# Patient Record
Sex: FEMALE | Race: WHITE | Employment: FULL TIME | URBAN - METROPOLITAN AREA
[De-identification: names, ages, dates, MRNs, and addresses within clinical notes are randomized per-mention and may not be internally consistent; named-entity substitution may affect disease eponyms.]

---

## 2019-06-13 ENCOUNTER — APPOINTMENT (OUTPATIENT)
Dept: GENERAL RADIOLOGY | Age: 49
DRG: 174 | End: 2019-06-13
Payer: MEDICARE

## 2019-06-13 ENCOUNTER — HOSPITAL ENCOUNTER (INPATIENT)
Age: 49
LOS: 2 days | Discharge: HOME | DRG: 174 | End: 2019-06-15
Attending: EMERGENCY MEDICINE | Admitting: INTERNAL MEDICINE
Payer: MEDICARE

## 2019-06-13 DIAGNOSIS — I21.3 ST ELEVATION MYOCARDIAL INFARCTION (STEMI), UNSPECIFIED ARTERY (HCC): Primary | ICD-10-CM

## 2019-06-13 LAB
ACTIVATED CLOTTING TIME, LOW RANGE: >400 SEC
ALBUMIN SERPL-MCNC: 4.1 GM/DL (ref 3.4–5)
ALP BLD-CCNC: 58 IU/L (ref 40–128)
ALT SERPL-CCNC: 13 U/L (ref 10–40)
ANION GAP SERPL CALCULATED.3IONS-SCNC: 13 MMOL/L (ref 4–16)
AST SERPL-CCNC: 19 IU/L (ref 15–37)
BASOPHILS ABSOLUTE: 0.1 K/CU MM
BASOPHILS RELATIVE PERCENT: 1.1 % (ref 0–1)
BILIRUB SERPL-MCNC: 0.2 MG/DL (ref 0–1)
BUN BLDV-MCNC: 18 MG/DL (ref 6–23)
CALCIUM SERPL-MCNC: 9.4 MG/DL (ref 8.3–10.6)
CHLORIDE BLD-SCNC: 101 MMOL/L (ref 99–110)
CO2: 23 MMOL/L (ref 21–32)
CREAT SERPL-MCNC: 0.9 MG/DL (ref 0.6–1.1)
DIFFERENTIAL TYPE: ABNORMAL
EOSINOPHILS ABSOLUTE: 0.2 K/CU MM
EOSINOPHILS RELATIVE PERCENT: 1.7 % (ref 0–3)
GFR AFRICAN AMERICAN: >60 ML/MIN/1.73M2
GFR NON-AFRICAN AMERICAN: >60 ML/MIN/1.73M2
GLUCOSE BLD-MCNC: 111 MG/DL (ref 70–99)
HCT VFR BLD CALC: 40.2 % (ref 37–47)
HEMOGLOBIN: 13 GM/DL (ref 12.5–16)
IMMATURE NEUTROPHIL %: 0.4 % (ref 0–0.43)
LIPASE: 30 IU/L (ref 13–60)
LV EF: 43 %
LVEF MODALITY: NORMAL
LYMPHOCYTES ABSOLUTE: 3.8 K/CU MM
LYMPHOCYTES RELATIVE PERCENT: 34.3 % (ref 24–44)
MCH RBC QN AUTO: 30.1 PG (ref 27–31)
MCHC RBC AUTO-ENTMCNC: 32.3 % (ref 32–36)
MCV RBC AUTO: 93.1 FL (ref 78–100)
MONOCYTES ABSOLUTE: 1 K/CU MM
MONOCYTES RELATIVE PERCENT: 9.2 % (ref 0–4)
NUCLEATED RBC %: 0 %
PDW BLD-RTO: 13.3 % (ref 11.7–14.9)
PLATELET # BLD: 421 K/CU MM (ref 140–440)
PMV BLD AUTO: 8.7 FL (ref 7.5–11.1)
POTASSIUM SERPL-SCNC: 3.7 MMOL/L (ref 3.5–5.1)
RBC # BLD: 4.32 M/CU MM (ref 4.2–5.4)
SEGMENTED NEUTROPHILS ABSOLUTE COUNT: 6 K/CU MM
SEGMENTED NEUTROPHILS RELATIVE PERCENT: 53.3 % (ref 36–66)
SODIUM BLD-SCNC: 137 MMOL/L (ref 135–145)
TOTAL IMMATURE NEUTOROPHIL: 0.04 K/CU MM
TOTAL NUCLEATED RBC: 0 K/CU MM
TOTAL PROTEIN: 6.9 GM/DL (ref 6.4–8.2)
WBC # BLD: 11.2 K/CU MM (ref 4–10.5)

## 2019-06-13 PROCEDURE — 2500000003 HC RX 250 WO HCPCS

## 2019-06-13 PROCEDURE — 71045 X-RAY EXAM CHEST 1 VIEW: CPT

## 2019-06-13 PROCEDURE — 6360000002 HC RX W HCPCS

## 2019-06-13 PROCEDURE — 93005 ELECTROCARDIOGRAM TRACING: CPT | Performed by: EMERGENCY MEDICINE

## 2019-06-13 PROCEDURE — 83690 ASSAY OF LIPASE: CPT

## 2019-06-13 PROCEDURE — 85347 COAGULATION TIME ACTIVATED: CPT

## 2019-06-13 PROCEDURE — 93458 L HRT ARTERY/VENTRICLE ANGIO: CPT | Performed by: INTERNAL MEDICINE

## 2019-06-13 PROCEDURE — 6360000002 HC RX W HCPCS: Performed by: INTERNAL MEDICINE

## 2019-06-13 PROCEDURE — 6370000000 HC RX 637 (ALT 250 FOR IP)

## 2019-06-13 PROCEDURE — 96372 THER/PROPH/DIAG INJ SC/IM: CPT

## 2019-06-13 PROCEDURE — 2100000000 HC CCU R&B

## 2019-06-13 PROCEDURE — 6360000004 HC RX CONTRAST MEDICATION

## 2019-06-13 PROCEDURE — 02703ZZ DILATION OF CORONARY ARTERY, ONE ARTERY, PERCUTANEOUS APPROACH: ICD-10-PCS | Performed by: INTERNAL MEDICINE

## 2019-06-13 PROCEDURE — 93306 TTE W/DOPPLER COMPLETE: CPT

## 2019-06-13 PROCEDURE — 92941 PRQ TRLML REVSC TOT OCCL AMI: CPT

## 2019-06-13 PROCEDURE — 92941 PRQ TRLML REVSC TOT OCCL AMI: CPT | Performed by: INTERNAL MEDICINE

## 2019-06-13 PROCEDURE — C1894 INTRO/SHEATH, NON-LASER: HCPCS

## 2019-06-13 PROCEDURE — 6370000000 HC RX 637 (ALT 250 FOR IP): Performed by: INTERNAL MEDICINE

## 2019-06-13 PROCEDURE — C1874 STENT, COATED/COV W/DEL SYS: HCPCS

## 2019-06-13 PROCEDURE — C1887 CATHETER, GUIDING: HCPCS

## 2019-06-13 PROCEDURE — C1725 CATH, TRANSLUMIN NON-LASER: HCPCS

## 2019-06-13 PROCEDURE — 2580000003 HC RX 258: Performed by: INTERNAL MEDICINE

## 2019-06-13 PROCEDURE — 99255 IP/OBS CONSLTJ NEW/EST HI 80: CPT | Performed by: INTERNAL MEDICINE

## 2019-06-13 PROCEDURE — 99285 EMERGENCY DEPT VISIT HI MDM: CPT

## 2019-06-13 PROCEDURE — 80053 COMPREHEN METABOLIC PANEL: CPT

## 2019-06-13 PROCEDURE — 4A023N7 MEASUREMENT OF CARDIAC SAMPLING AND PRESSURE, LEFT HEART, PERCUTANEOUS APPROACH: ICD-10-PCS | Performed by: INTERNAL MEDICINE

## 2019-06-13 PROCEDURE — 2580000003 HC RX 258

## 2019-06-13 PROCEDURE — 93458 L HRT ARTERY/VENTRICLE ANGIO: CPT

## 2019-06-13 PROCEDURE — 2709999900 HC NON-CHARGEABLE SUPPLY

## 2019-06-13 PROCEDURE — 85025 COMPLETE CBC W/AUTO DIFF WBC: CPT

## 2019-06-13 PROCEDURE — 2720000010 HC SURG SUPPLY STERILE

## 2019-06-13 PROCEDURE — 93010 ELECTROCARDIOGRAM REPORT: CPT | Performed by: INTERNAL MEDICINE

## 2019-06-13 PROCEDURE — C1769 GUIDE WIRE: HCPCS

## 2019-06-13 PROCEDURE — 87081 CULTURE SCREEN ONLY: CPT

## 2019-06-13 PROCEDURE — 6360000002 HC RX W HCPCS: Performed by: EMERGENCY MEDICINE

## 2019-06-13 RX ORDER — HEPARIN SODIUM 5000 [USP'U]/ML
5000 INJECTION, SOLUTION INTRAVENOUS; SUBCUTANEOUS ONCE
Status: DISCONTINUED | OUTPATIENT
Start: 2019-06-13 | End: 2019-06-13

## 2019-06-13 RX ORDER — PRASUGREL 10 MG/1
10 TABLET, FILM COATED ORAL DAILY
Status: DISCONTINUED | OUTPATIENT
Start: 2019-06-14 | End: 2019-06-13

## 2019-06-13 RX ORDER — SODIUM CHLORIDE 0.9 % (FLUSH) 0.9 %
10 SYRINGE (ML) INJECTION EVERY 12 HOURS SCHEDULED
Status: DISCONTINUED | OUTPATIENT
Start: 2019-06-13 | End: 2019-06-15 | Stop reason: HOSPADM

## 2019-06-13 RX ORDER — IBUPROFEN 200 MG
200 TABLET ORAL EVERY 6 HOURS PRN
COMMUNITY
End: 2020-06-28

## 2019-06-13 RX ORDER — HEPARIN SODIUM 5000 [USP'U]/ML
5000 INJECTION, SOLUTION INTRAVENOUS; SUBCUTANEOUS ONCE
Status: COMPLETED | OUTPATIENT
Start: 2019-06-13 | End: 2019-06-13

## 2019-06-13 RX ORDER — ASPIRIN 81 MG/1
81 TABLET, CHEWABLE ORAL DAILY
Status: DISCONTINUED | OUTPATIENT
Start: 2019-06-14 | End: 2019-06-15 | Stop reason: HOSPADM

## 2019-06-13 RX ORDER — NITROGLYCERIN 0.4 MG/1
TABLET SUBLINGUAL
Status: COMPLETED
Start: 2019-06-13 | End: 2019-06-13

## 2019-06-13 RX ORDER — FENTANYL CITRATE 50 UG/ML
50 INJECTION, SOLUTION INTRAMUSCULAR; INTRAVENOUS ONCE
Status: DISCONTINUED | OUTPATIENT
Start: 2019-06-13 | End: 2019-06-13

## 2019-06-13 RX ORDER — SODIUM CHLORIDE 9 MG/ML
INJECTION, SOLUTION INTRAVENOUS CONTINUOUS
Status: DISCONTINUED | OUTPATIENT
Start: 2019-06-13 | End: 2019-06-13

## 2019-06-13 RX ORDER — ONDANSETRON 2 MG/ML
4 INJECTION INTRAMUSCULAR; INTRAVENOUS EVERY 6 HOURS PRN
Status: DISCONTINUED | OUTPATIENT
Start: 2019-06-13 | End: 2019-06-15 | Stop reason: HOSPADM

## 2019-06-13 RX ORDER — SODIUM CHLORIDE 0.9 % (FLUSH) 0.9 %
10 SYRINGE (ML) INJECTION PRN
Status: DISCONTINUED | OUTPATIENT
Start: 2019-06-13 | End: 2019-06-15 | Stop reason: HOSPADM

## 2019-06-13 RX ORDER — PRASUGREL 10 MG/1
10 TABLET, FILM COATED ORAL DAILY
Status: DISCONTINUED | OUTPATIENT
Start: 2019-06-14 | End: 2019-06-15 | Stop reason: HOSPADM

## 2019-06-13 RX ORDER — ACETAMINOPHEN 325 MG/1
650 TABLET ORAL EVERY 4 HOURS PRN
Status: DISCONTINUED | OUTPATIENT
Start: 2019-06-13 | End: 2019-06-15 | Stop reason: HOSPADM

## 2019-06-13 RX ORDER — ATORVASTATIN CALCIUM 40 MG/1
80 TABLET, FILM COATED ORAL NIGHTLY
Status: DISCONTINUED | OUTPATIENT
Start: 2019-06-13 | End: 2019-06-15 | Stop reason: HOSPADM

## 2019-06-13 RX ORDER — ASPIRIN 325 MG
500 TABLET ORAL EVERY 4 HOURS PRN
Status: ON HOLD | COMMUNITY
End: 2019-06-14 | Stop reason: HOSPADM

## 2019-06-13 RX ORDER — ATORVASTATIN CALCIUM 40 MG/1
80 TABLET, FILM COATED ORAL NIGHTLY
Status: DISCONTINUED | OUTPATIENT
Start: 2019-06-13 | End: 2019-06-13

## 2019-06-13 RX ADMIN — SODIUM CHLORIDE: 9 INJECTION, SOLUTION INTRAVENOUS at 06:49

## 2019-06-13 RX ADMIN — NITROGLYCERIN 0.4 MG: 0.4 TABLET, ORALLY DISINTEGRATING SUBLINGUAL at 04:54

## 2019-06-13 RX ADMIN — TIROFIBAN 0.15 MCG/KG/MIN: 5 INJECTION, SOLUTION INTRAVENOUS at 09:42

## 2019-06-13 RX ADMIN — TIROFIBAN 0.15 MCG/KG/MIN: 5 INJECTION, SOLUTION INTRAVENOUS at 06:48

## 2019-06-13 RX ADMIN — ACETAMINOPHEN 650 MG: 325 TABLET ORAL at 20:50

## 2019-06-13 RX ADMIN — SODIUM CHLORIDE, PRESERVATIVE FREE 10 ML: 5 INJECTION INTRAVENOUS at 20:51

## 2019-06-13 RX ADMIN — ATORVASTATIN CALCIUM 80 MG: 40 TABLET, FILM COATED ORAL at 20:50

## 2019-06-13 RX ADMIN — TIROFIBAN 0.15 MCG/KG/MIN: 5 INJECTION, SOLUTION INTRAVENOUS at 15:38

## 2019-06-13 RX ADMIN — METOPROLOL TARTRATE 25 MG: 25 TABLET, FILM COATED ORAL at 20:50

## 2019-06-13 RX ADMIN — SODIUM CHLORIDE: 9 INJECTION, SOLUTION INTRAVENOUS at 12:31

## 2019-06-13 RX ADMIN — HEPARIN SODIUM 5000 UNITS: 5000 INJECTION, SOLUTION INTRAVENOUS; SUBCUTANEOUS at 04:39

## 2019-06-13 RX ADMIN — METOPROLOL TARTRATE 25 MG: 25 TABLET, FILM COATED ORAL at 08:19

## 2019-06-13 RX ADMIN — ACETAMINOPHEN 650 MG: 325 TABLET ORAL at 08:56

## 2019-06-13 RX ADMIN — Medication: at 06:49

## 2019-06-13 ASSESSMENT — PAIN DESCRIPTION - PROGRESSION
CLINICAL_PROGRESSION: NOT CHANGED
CLINICAL_PROGRESSION: GRADUALLY WORSENING

## 2019-06-13 ASSESSMENT — PAIN DESCRIPTION - ONSET
ONSET: ON-GOING
ONSET: ON-GOING

## 2019-06-13 ASSESSMENT — PAIN DESCRIPTION - ORIENTATION
ORIENTATION: RIGHT;LEFT
ORIENTATION: RIGHT;LEFT;LOWER

## 2019-06-13 ASSESSMENT — PAIN DESCRIPTION - PAIN TYPE
TYPE: CHRONIC PAIN
TYPE: ACUTE PAIN
TYPE: CHRONIC PAIN
TYPE: CHRONIC PAIN

## 2019-06-13 ASSESSMENT — PAIN SCALES - GENERAL
PAINLEVEL_OUTOF10: 8
PAINLEVEL_OUTOF10: 3
PAINLEVEL_OUTOF10: 10
PAINLEVEL_OUTOF10: 8
PAINLEVEL_OUTOF10: 5
PAINLEVEL_OUTOF10: 0
PAINLEVEL_OUTOF10: 8

## 2019-06-13 ASSESSMENT — PAIN DESCRIPTION - DESCRIPTORS
DESCRIPTORS: ACHING;BURNING
DESCRIPTORS: PRESSURE
DESCRIPTORS: ACHING;BURNING
DESCRIPTORS: ACHING;DISCOMFORT

## 2019-06-13 ASSESSMENT — PAIN DESCRIPTION - LOCATION
LOCATION: CHEST;ARM
LOCATION: HIP;LEG
LOCATION: HIP;LEG
LOCATION: OTHER (COMMENT)

## 2019-06-13 ASSESSMENT — PAIN DESCRIPTION - FREQUENCY
FREQUENCY: CONTINUOUS

## 2019-06-13 ASSESSMENT — PAIN - FUNCTIONAL ASSESSMENT: PAIN_FUNCTIONAL_ASSESSMENT: PREVENTS OR INTERFERES SOME ACTIVE ACTIVITIES AND ADLS

## 2019-06-13 NOTE — CONSULTS
Name:  Feli Evans /Age/Sex: 1970  (51 y.o. female)   MRN & CSN:  [id number] & [id number] Admission Date/Time: 2019  4:11 AM   Location:  ED21/ED-21 PCP: Deric Gorman Day: 1          Referring physician:  No admitting provider for patient encounter. Reason for consultation:  STEMI        Thanks for referral.    Information source: patient    CC;  CP    Thank you for involving me in taking  care of Feli Evans who  is a 51 y. o.year  Old female  Presents with  Severe mid sternal constant CP rad to neck for an hour came to ED has ST elev in inf leads. Getting heparin, ASA to Cath lab. Past medical history:    has a past medical history of MRSA infection. Past surgical history:   has a past surgical history that includes Cholecystectomy. Social History:   reports that she has been smoking cigarettes. She has been smoking about 1.00 pack per day. She has never used smokeless tobacco. She reports that she drinks alcohol. She reports that she does not use drugs. Family history:  family history is not on file. No Known Allergies      nitroGLYCERIN (NITROSTAT) 0.4 MG SL tablet      Current Facility-Administered Medications   Medication Dose Route Frequency Provider Last Rate Last Dose    nitroGLYCERIN (NITROSTAT) 0.4 MG SL tablet              No current outpatient medications on file. Review of Systems:  All 14 systems reviewed, all negative except for  CP    Physical Examination:    BP (!) 120/94   Pulse 82   Temp 98.3 °F (36.8 °C) (Oral)   Resp 12   Ht 5' 9\" (1.753 m)   Wt 240 lb (108.9 kg)   SpO2 98%   BMI 35.44 kg/m²    Wt Readings from Last 3 Encounters:   19 240 lb (108.9 kg)     Body mass index is 35.44 kg/m².       General Appearance:  fair  Head: normocephalic     Eyes: normal, noninjected conjunctiva    ENT: normal mucosa, noninjected throat, normal     NECK: No JVP  No thyromegaly        Cardiovascular: No thrills palpated   Auscultation: Normal S1 and S2,  no murmur   carotid bruit no   Abdominal Aorta no bruit    Respiratory:    Breath sounds Clear = 0    Extremities:  none Edema clubbing ,   no cyanosis    SKIN: Warm and well perfused, no pallor or cyanosis    Vascular exam:  Pedal Pulses: palp  bilaterally        Abdomen:  No masses or tenderness. No organomegaly noted. Neurological:  Oriented to time, place, and person   No focal neurological deficit noted. Psychiatric:normal mood, no anxiety    Lab Review   No results for input(s): WBC, HGB, HCT, PLT in the last 72 hours. No results for input(s): NA, K, CL, CO2, PHOS, BUN, CREATININE in the last 72 hours. Invalid input(s): CA  No results for input(s): AST, ALT, ALB, BILIDIR, BILITOT, ALKPHOS in the last 72 hours. No results for input(s): TROPONINI in the last 72 hours.   No results found for: BNP  No results found for: INR, PROTIME        Assessment/Recommendations:     - STEMI of Inf wall: ASA, statin, heparin to cath lab  - Risk factor modification         Marylou Escalera MD, 6/13/2019 4:31 AM

## 2019-06-13 NOTE — PROGRESS NOTES
Nutrition Assessment (Low Risk)    Type and Reason for Visit: Positive Nutrition Screen, Initial, Patient Education(wt loss)    Nutrition Recommendations:   Continue Cardiac Diet     Nutrition Assessment:   Pt has intentionally lost wt PTA by cutting out soda for past year. Eating well on Cardiac Diet post MI. Provided/reviewed Heart Healthy Eating Nutrition Therapy. Patient assessed for nutritional risk. Deemed to be at low risk at this time. Will continue to monitor for changes in status.     Malnutrition Assessment:  · Malnutrition Status: No malnutrition    Nutrition Risk Level   Risk Level: Low    Nutrition Diagnosis:   · Problem: No nutrition diagnosis at this time    Nutrition Intervention:  Food and/or Delivery: Continue current diet  Nutrition Education/Counseling/Coordination of Care:  Continued Inpatient Monitoring, Education Completed, Coordination of Care      Electronically signed by Garett Conway RD, LD on 6/13/19 at 1:08 PM    Contact Number: [phone]

## 2019-06-13 NOTE — ED PROVIDER NOTES
week: None     Minutes per session: None    Stress: None   Relationships    Social connections:     Talks on phone: None     Gets together: None     Attends Jew service: None     Active member of club or organization: None     Attends meetings of clubs or organizations: None     Relationship status: None    Intimate partner violence:     Fear of current or ex partner: None     Emotionally abused: None     Physically abused: None     Forced sexual activity: None   Other Topics Concern    None   Social History Narrative    None       Medications/Allergies     Previous Medications    No medications on file     No Known Allergies     Physical Exam       ED Triage Vitals [19 0406]   BP Temp Temp Source Pulse Resp SpO2 Height Weight   (!) 120/94 98.3 °F (36.8 °C) Oral 82 12 98 % 5' 9\" (1.753 m) 240 lb (108.9 kg)     GENERAL APPEARANCE: Awake and alert. Cooperative. No acute distress, but is tearful and appears uncomfortable  HEAD: Normocephalic. Atraumatic. EYES: Sclera anicteric. ENT: Tolerates saliva. No trismus. NECK: Supple. Trachea midline. CARDIO: RRR. Radial pulse 2+. LUNGS: Respirations unlabored. CTAB. ABDOMEN: Soft. Non-distended. Non-tender. EXTREMITIES: No acute deformities. No pitting edema, and intact DP pulses bilateral lower extremities  SKIN: Warm and dry. NEUROLOGICAL: No gross facial drooping. Moves all 4 extremities spontaneously. PSYCHIATRIC: Normal mood. Diagnostics   Labs:  Unable to include labs because there is asterixis in the ECG print out, they were reviewed by the physician    Radiographs:  No results found. Procedures/EK lead EKG per my interpretation:  Normal Sinus Rhythm 86  Axis is   Normal  QTc is  normal  There is specific T wave changes appreciated. There is specific ST wave changes appreciated.   There is ST elevation in 2,3 aVF and V4 V5 V6 consistent with a STEMI  Prior EKG to compare with was not available     ED Course and MDM   In brief, Nikko Dawkins is a 51 y.o. female who presented to the emergency department with chest pain and was found to have a STEMI. Patient's physical exam without evidence of congestive heart failure. Dr. Shola Alvarado. was notified and is currently at the bedside planning to take the pt to the cath lab once the team is assembled. The patient is artery received a full dose of aspirin as she was given 2 more aspirin with EMS, was given a 5000 unit bolus of heparin here and taken to the Cath Lab. ED Medication Orders (From admission, onward)    Start Ordered     Status Ordering Provider    06/13/19 0445 06/13/19 0437  heparin (porcine) injection 5,000 Units  ONCE      Last MAR action:  Given - by VARINDER CARLIN on 06/13/19 at [address] [place] [address] PAULY    06/13/19 0429 06/13/19 0429  nitroGLYCERIN (NITROSTAT) 0.4 MG SL tablet     Note to Pharmacy:  Chelsey Chan: cabinet override    Ordered           Final Impression      1.  ST elevation myocardial infarction (STEMI), unspecified artery ([facility] [address]      DISPOSITION Decision To Admit 06/13/2019 04:41:38 AM     (Please note that portions of this note may have been completed with a voice recognition program. Efforts were made to edit the dictations but occasionally words are mis-transcribed.)    MD Brandie Leslie MD  06/13/19 8804

## 2019-06-13 NOTE — PROGRESS NOTES
Rest of the air removed from TR BAND- tegaderm applied- hand washed off. No bldg noted- no hematoma noted.  Pt tolerated well

## 2019-06-13 NOTE — PROGRESS NOTES
Dr. Diego Gómez called- general updates given- also new orders received to place new med orders in and new consult for hospitalist

## 2019-06-13 NOTE — H&P
Nikko Dawkins, 51 y.o., female    Primary care physician:  Jolaine Dancer     Chief Complaint: Chest Pain/ STEMI    History of Present Illness:  She comes in for evaluation after he started having chest pain in the middle of the night around 3 AM.  She says the pain then started radiating to both her arms it was unrelenting 10 out of 10 associated with some shortness of breath. She has long-standing history of bilateral lower extremity pain thought to be sciatica. She says that she is never had symptoms like this before she does have family history of early coronary artery disease. She is not known to have any other medical problems. The pain was persistent ongoing therefore 911 was called in the emergency department she was noted to have ST elevation. Lab was activated    Past medical history:    has a past medical history of MRSA infection. Past surgical history:   has a past surgical history that includes Cholecystectomy (1992) and skin biopsy. Social History:   reports that she has been smoking cigarettes. She has been smoking about 1.00 pack per day. She has never used smokeless tobacco. She reports that she drinks alcohol. She reports that she has current or past drug history. Drug: Marijuana. Frequency: 2.00 times per week. Family history:  family history includes Arthritis in her sister; Asthma in her sister; Coronary Art Dis in her father; Diabetes in her father; Heart Disease in her father; High Blood Pressure in her father; High Cholesterol in her father. Allergies:    No Known Allergies    Home Medications:  Prior to Admission medications    Medication Sig Start Date End Date Taking?  Authorizing Provider   ibuprofen (ADVIL;MOTRIN) 200 MG tablet Take 200 mg by mouth every 6 hours as needed for Pain   Yes Historical Provider, MD   aspirin 325 MG tablet Take 500 mg by mouth every 4 hours as needed for Pain   Yes Historical Provider, MD       Review of systems: Review of Systems: range of motion in all major joints. No tenderness to palpation or major deformities noted. Back- No tenderness. Integument:  Warm, Dry, No erythema, No rash. Skin: no rash, no ulcers  Lymphatic:  No lymphadenopathy noted. Neurologic:  Alert & oriented x 3, Normal motor function, Normal sensory function, No focal deficits noted. Lab Review   Recent Labs     06/13/19 0425   WBC 11.2*   HGB 13.0   HCT 40.2         Recent Labs     06/13/19 0425      K 3.7      CO2 23   BUN 18   CREATININE 0.9     Recent Labs     06/13/19 0425   AST 19   ALT 13   BILITOT 0.2   ALKPHOS 58     No results for input(s): TROPONINI in the last 72 hours. No results found for: INR, PROTIME  No results found for: BNP      Assessment:    STEMI Anterior        ASA : 2 , Mallampati class II  Plan:   1. Chest Pain/ STEMI: Alternate and risks versus benefits were discussed in detail. We will plan cardiac catheterization. We  discussed the risk of but not limited to  potential kidney failure, emergent surgery,blood transfusion  transfusion, infection, potential even death. Patient is in agreement and wants to proceed  2.start statins  3.  Consult hospitalist for co management

## 2019-06-13 NOTE — CONSULTS
Consult Note (Hospitalist, Internal Medicine)  IDENTIFYING INFORMATION   PATIENT:  Peter Knight  MRN:  [id number]  ADMIT DATE: 6/13/2019  TIME OF EVALUATION: 6/13/2019 11:47 AM    REASON FOR CONSULT   Chest pain    HISTORY OF PRESENT ILLNESS   Peter Knight is a 51 y.o. female with a past medical history of 25 pack years smoking, hx of CAD s/p PCI age 47s in father and a strong paternal hx of CV disease who presents with STEMI. She was sitting working on a computer this morning when she noted onset of left sided sternal chest pain, initially sharp but later had sharp characteristic to pain 10/10, radiate to b/l arms with tingling sensation. She took 2 ASA with no improvement. On route, EMS offered some nitro with some improvement. She was dx with STEMI in the ED and went for emergent LHC. PAST MEDICAL, SURGICAL, FAMILY, and SOCIAL HISTORY     Past Medical History:   Diagnosis Date    MRSA infection      Past Surgical History:   Procedure Laterality Date    CHOLECYSTECTOMY  1992    SKIN BIOPSY      17 mrsa spot removals, head shoulder stomach back     Family History   Problem Relation Age of Onset    Diabetes Father     Coronary Art Dis Father     Heart Disease Father     High Cholesterol Father     High Blood Pressure Father     Arthritis Sister     Asthma Sister      Social History     Socioeconomic History    Marital status: Legally      Spouse name: Yohan Mcneill    Number of children: 1    Years of education: 15    Highest education level: None   Occupational History    None   Social Needs    Financial resource strain: None    Food insecurity:     Worry: None     Inability: None    Transportation needs:     Medical: None     Non-medical: None   Tobacco Use    Smoking status: Current Every Day Smoker     Packs/day: 1.00     Types: Cigarettes    Smokeless tobacco: Never Used   Substance and Sexual Activity    Alcohol use: Yes     Comment: occasional    Drug use:  Yes Frequency: 2.0 times per week     Types: Marijuana    Sexual activity: Not Currently   Lifestyle    Physical activity:     Days per week: None     Minutes per session: None    Stress: None   Relationships    Social connections:     Talks on phone: None     Gets together: None     Attends Yarsanism service: None     Active member of club or organization: None     Attends meetings of clubs or organizations: None     Relationship status: None    Intimate partner violence:     Fear of current or ex partner: None     Emotionally abused: None     Physically abused: None     Forced sexual activity: None   Other Topics Concern    None   Social History Narrative    None       MEDICATIONS   Medications Prior to Admission  Medications Prior to Admission: ibuprofen (ADVIL;MOTRIN) 200 MG tablet, Take 200 mg by mouth every 6 hours as needed for Pain  aspirin 325 MG tablet, Take 500 mg by mouth every 4 hours as needed for Pain    Current Medications  Current Facility-Administered Medications   Medication Dose Route Frequency Provider Last Rate Last Dose    0.9 % sodium chloride infusion   Intravenous Continuous Katharine Robb MD 75 mL/hr at 06/13/19 0649      sodium chloride flush 0.9 % injection 10 mL  10 mL Intravenous 2 times per day Katharine Robb MD        sodium chloride flush 0.9 % injection 10 mL  10 mL Intravenous PRN Katharine Robb MD        acetaminophen (TYLENOL) tablet 650 mg  650 mg Oral Q4H PRN Katharine Robb MD   650 mg at 06/13/19 0856    magnesium hydroxide (MILK OF MAGNESIA) 400 MG/5ML suspension 30 mL  30 mL Oral Daily PRN Katharine Robb MD        ondansetron [facility]) injection 4 mg  4 mg Intravenous Q6H PRN Katharine Robb MD        [START ON 6/14/2019] aspirin chewable tablet 81 mg  81 mg Oral Daily Katharine Robb MD        [START ON 6/14/2019] prasugrel (EFFIENT) tablet 10 mg  10 mg Oral Daily Katharine Robb MD        tirofiban (AGGRASTAT) IV bolus 2,722.5 mcg 25 mcg/kg Intravenous Once Fadi Lang MD        tirofiban (AGGRASTAT) 50 mcg/mL infusion  0.15 mcg/kg/min Intravenous Continuous Fadi Lang MD 19.6 mL/hr at 06/13/19 0942 0.15 mcg/kg/min at 06/13/19 0942    metoprolol tartrate (LOPRESSOR) tablet 25 mg  25 mg Oral BID Fadi Lang MD   25 mg at 06/13/19 0819    atorvastatin (LIPITOR) tablet 80 mg  80 mg Oral Nightly Fadi Lang MD             Allergies  No Known Allergies    REVIEW OF SYSTEMS     ROS  14 point review of systems reviewed. Pertinent positive or negative as per HPI or otherwise reviewed as negative    PHYSICAL EXAM     Wt Readings from Last 3 Encounters:   06/13/19 (S) 261 lb 3.9 oz (118.5 kg)       Blood pressure 98/71, pulse 77, temperature 98 °F (36.7 °C), temperature source Oral, resp. rate 20, height 5' 9\" (1.753 m), weight (S) 261 lb 3.9 oz (118.5 kg), last menstrual period 06/13/2018, SpO2 99 %. General - AAO x 3  Psych - Appropriate affect/speech. No agitation  Eyes - Eye lids intact. No scleral icterus  ENT - Lips wnl. External ear clear/dry/intact. No thyromegaly on inspection  Neuro - No gross peripheral or central neuro deficits on inspection  Heart - Sinus. RRR. S1 and S2 present. No elevated JVD appreciated  Lung - Adequate air entry b/l, No crackes/wheezes appreciated  GI - Soft. No guarding/rigidity. BS+   - No CVA/suprapubic tenderness or palpable bladder distension  Skin - Intact. No rash/petechiae/ecchymosis. Warm extremities  MSK - Joints with normal ROM.  No joint swellings          LABS AND IMAGING   CBC  [unfilled]    Last 3 Hemoglobin  Lab Results   Component Value Date    HGB 13.0 06/13/2019     Last 3 WBC/ANC  Lab Results   Component Value Date    WBC 11.2 06/13/2019     No components found for: GRNLOCTYABS  Last 3 Platelets  No results found for: PLATELET  Chemistry  [unfilled]  [unfilled]  No results found for: LDH  Coagulation Studies  No results found for: PTT, INR  Liver Function Studies  Lab Results   Component Value Date    ALT 13 06/13/2019    AST 19 06/13/2019    ALKPHOS 58 06/13/2019       Recent Imaging     XR CHEST PORTABLE [[id number]] Collected: 06/13/19 0504     Order Status: Completed Updated: 06/13/19 0508     Narrative:       EXAMINATION:  ONE XRAY VIEW OF THE CHEST    6/13/2019 4:48 am    COMPARISON:  10/20/2008    HISTORY:  ORDERING SYSTEM PROVIDED HISTORY: cp  TECHNOLOGIST PROVIDED HISTORY:  Reason for exam:->cp  Ordering Physician Provided Reason for Exam: cp  Acuity: Acute  Type of Exam: Initial  Mechanism of Injury: cp  Relevant Medical/Surgical History: cp    FINDINGS:  The lungs are adequately inflated.  There is no focal consolidation, pleural  effusion or pneumothorax.  The cardiac silhouette is mildly enlarged, but  stable.  No acute bony abnormality.     Impression:       No acute process.          EKG personally reviewed with rate 86, ST elevation anterolateral leads      Relevant labs and imaging reviewed    ASSESSMENT AND PLAN     NSTEMI  - s/p emergent PCI 6/13  - DAPT, statin, BB  - IV aggrastat for 12 hours  - IVF  - TTE with LVEF 40-45%, apical dyskinesis   - tele, pulse ox monitoring of early post-stemi complications  - check lipid panel, may benefit statin    Tobacco abuse  - discussed chantix outpatient which she agrees to try    Lovenox ppx    [address] [facility], Internal Medicine  6/13/2019 at 11:47 AM

## 2019-06-13 NOTE — ED NOTES
Bed: ED-21  Expected date:   Expected time:   Means of arrival:   Comments:  EMS 41 F chest pain     Esther Tidwell  06/13/19 [id number]

## 2019-06-14 LAB
CHOLESTEROL: 160 MG/DL
CULTURE: NORMAL
HCT VFR BLD CALC: 36.6 % (ref 37–47)
HDLC SERPL-MCNC: 48 MG/DL
HEMOGLOBIN: 11.7 GM/DL (ref 12.5–16)
LDL CHOLESTEROL DIRECT: 114 MG/DL
Lab: NORMAL
MCH RBC QN AUTO: 30.2 PG (ref 27–31)
MCHC RBC AUTO-ENTMCNC: 32 % (ref 32–36)
MCV RBC AUTO: 94.6 FL (ref 78–100)
PDW BLD-RTO: 13.5 % (ref 11.7–14.9)
PLATELET # BLD: 342 K/CU MM (ref 140–440)
PMV BLD AUTO: 8.7 FL (ref 7.5–11.1)
RBC # BLD: 3.87 M/CU MM (ref 4.2–5.4)
SPECIMEN: NORMAL
TRIGL SERPL-MCNC: 115 MG/DL
WBC # BLD: 8 K/CU MM (ref 4–10.5)

## 2019-06-14 PROCEDURE — 83721 ASSAY OF BLOOD LIPOPROTEIN: CPT

## 2019-06-14 PROCEDURE — 6360000002 HC RX W HCPCS: Performed by: INTERNAL MEDICINE

## 2019-06-14 PROCEDURE — 6370000000 HC RX 637 (ALT 250 FOR IP): Performed by: INTERNAL MEDICINE

## 2019-06-14 PROCEDURE — 2140000000 HC CCU INTERMEDIATE R&B

## 2019-06-14 PROCEDURE — 80061 LIPID PANEL: CPT

## 2019-06-14 PROCEDURE — 85027 COMPLETE CBC AUTOMATED: CPT

## 2019-06-14 PROCEDURE — 2580000003 HC RX 258: Performed by: INTERNAL MEDICINE

## 2019-06-14 PROCEDURE — 99232 SBSQ HOSP IP/OBS MODERATE 35: CPT | Performed by: INTERNAL MEDICINE

## 2019-06-14 PROCEDURE — 94761 N-INVAS EAR/PLS OXIMETRY MLT: CPT

## 2019-06-14 RX ORDER — PRASUGREL 10 MG/1
10 TABLET, FILM COATED ORAL DAILY
Qty: 90 TABLET | Refills: 0 | Status: SHIPPED | OUTPATIENT
Start: 2019-06-15 | End: 2020-02-13 | Stop reason: SDUPTHER

## 2019-06-14 RX ORDER — VARENICLINE TARTRATE 0.5 MG/1
.5-1 TABLET, FILM COATED ORAL SEE ADMIN INSTRUCTIONS
Qty: 57 TABLET | Refills: 0 | Status: SHIPPED | OUTPATIENT
Start: 2019-06-14 | End: 2020-02-13

## 2019-06-14 RX ORDER — POTASSIUM CHLORIDE 750 MG/1
40 TABLET, FILM COATED, EXTENDED RELEASE ORAL ONCE
Status: COMPLETED | OUTPATIENT
Start: 2019-06-14 | End: 2019-06-14

## 2019-06-14 RX ORDER — ATORVASTATIN CALCIUM 80 MG/1
80 TABLET, FILM COATED ORAL NIGHTLY
Qty: 90 TABLET | Refills: 0 | Status: SHIPPED | OUTPATIENT
Start: 2019-06-14 | End: 2020-02-13 | Stop reason: SDUPTHER

## 2019-06-14 RX ORDER — ASPIRIN 81 MG/1
81 TABLET ORAL DAILY
Qty: 90 TABLET | Refills: 0 | Status: ON HOLD | OUTPATIENT
Start: 2019-06-14 | End: 2022-10-21 | Stop reason: HOSPADM

## 2019-06-14 RX ADMIN — ENOXAPARIN SODIUM 40 MG: 40 INJECTION SUBCUTANEOUS at 22:42

## 2019-06-14 RX ADMIN — METOPROLOL TARTRATE 25 MG: 25 TABLET, FILM COATED ORAL at 22:42

## 2019-06-14 RX ADMIN — ASPIRIN 81 MG 81 MG: 81 TABLET ORAL at 08:36

## 2019-06-14 RX ADMIN — METOPROLOL TARTRATE 25 MG: 25 TABLET, FILM COATED ORAL at 08:37

## 2019-06-14 RX ADMIN — ATORVASTATIN CALCIUM 80 MG: 40 TABLET, FILM COATED ORAL at 22:41

## 2019-06-14 RX ADMIN — SODIUM CHLORIDE, PRESERVATIVE FREE 10 ML: 5 INJECTION INTRAVENOUS at 22:43

## 2019-06-14 RX ADMIN — POTASSIUM CHLORIDE 40 MEQ: 750 TABLET, FILM COATED, EXTENDED RELEASE ORAL at 10:52

## 2019-06-14 RX ADMIN — PRASUGREL 10 MG: 10 TABLET, FILM COATED ORAL at 08:36

## 2019-06-14 RX ADMIN — ACETAMINOPHEN 650 MG: 325 TABLET ORAL at 08:36

## 2019-06-14 ASSESSMENT — PAIN DESCRIPTION - DESCRIPTORS
DESCRIPTORS: ACHING;DISCOMFORT
DESCRIPTORS: ACHING;BURNING

## 2019-06-14 ASSESSMENT — PAIN DESCRIPTION - PAIN TYPE
TYPE: CHRONIC PAIN
TYPE: CHRONIC PAIN

## 2019-06-14 ASSESSMENT — PAIN SCALES - GENERAL
PAINLEVEL_OUTOF10: 10
PAINLEVEL_OUTOF10: 8

## 2019-06-14 ASSESSMENT — PAIN DESCRIPTION - FREQUENCY
FREQUENCY: CONTINUOUS
FREQUENCY: CONTINUOUS

## 2019-06-14 ASSESSMENT — PAIN DESCRIPTION - PROGRESSION: CLINICAL_PROGRESSION: NOT CHANGED

## 2019-06-14 ASSESSMENT — PAIN DESCRIPTION - ORIENTATION
ORIENTATION: RIGHT;LEFT;LOWER;MID
ORIENTATION: RIGHT;LEFT

## 2019-06-14 ASSESSMENT — PAIN DESCRIPTION - LOCATION
LOCATION: HIP;LEG
LOCATION: BACK;LEG

## 2019-06-14 ASSESSMENT — PAIN DESCRIPTION - ONSET: ONSET: ON-GOING

## 2019-06-14 NOTE — FLOWSHEET NOTE
AM labs collected sent to lab @ this time as ordered    RN offered to assist patient with bath this morning, she replies, not right now please. I am still very tired. \" Patient has been awake going to bathroom every 1 hour to 2 hours.      Esequiel Mckeon RN 6:38 AM

## 2019-06-14 NOTE — PROGRESS NOTES
Progress Note (Hospitalist, Internal Medicine)  IDENTIFYING INFORMATION   PATIENT:  Asuncion Ontiveros  MRN:  [id number]  ADMIT DATE: 6/13/2019  TIME OF EVALUATION: 6/14/2019 11:40 AM      HISTORY OF PRESENT ILLNESS   Asuncion Ontiveros is a 51 y.o. female with a past medical history of 25 pack years smoking, hx of CAD s/p PCI age 47s in father and a strong paternal hx of CV disease who presents with STEMI. She was sitting working on a computer this morning when she noted onset of left sided sternal chest pain, initially sharp but later had sharp characteristic to pain 10/10, radiate to b/l arms with tingling sensation. She took 2 ASA with no improvement. On route, EMS offered some nitro with some improvement. She was dx with STEMI in the ED and went for emergent LHC. SUBJECTIVE     Doing fair.  Discussed attempts to increase activity    MEDICATIONS   Medications Prior to Admission  Medications Prior to Admission: ibuprofen (ADVIL;MOTRIN) 200 MG tablet, Take 200 mg by mouth every 6 hours as needed for Pain  [DISCONTINUED] aspirin 325 MG tablet, Take 500 mg by mouth every 4 hours as needed for Pain    Current Medications  Current Facility-Administered Medications   Medication Dose Route Frequency Provider Last Rate Last Dose    sodium chloride flush 0.9 % injection 10 mL  10 mL Intravenous 2 times per day Pedro Payton MD   10 mL at 06/13/19 2051    sodium chloride flush 0.9 % injection 10 mL  10 mL Intravenous PRN Pedro Payton MD        acetaminophen (TYLENOL) tablet 650 mg  650 mg Oral Q4H PRN Pedro Payton MD   650 mg at 06/14/19 0836    magnesium hydroxide (MILK OF MAGNESIA) 400 MG/5ML suspension 30 mL  30 mL Oral Daily PRN Pedro Payton MD        ondansetron [facility]) injection 4 mg  4 mg Intravenous Q6H PRN Pedro Payton MD        aspirin chewable tablet 81 mg  81 mg Oral Daily Pedro Payton MD   81 mg at 06/14/19 0836    prasugrel (EFFIENT) tablet 10 mg  10 mg Oral Daily Maria De Jesus Somers Jl Goldman MD   10 mg at 06/14/19 0836    tirofiban (AGGRASTAT) IV bolus 2,722.5 mcg  25 mcg/kg Intravenous Once Fabricio Olmstead MD        metoprolol tartrate (LOPRESSOR) tablet 25 mg  25 mg Oral BID Fabricio Olmstead MD   25 mg at 06/14/19 0837    atorvastatin (LIPITOR) tablet 80 mg  80 mg Oral Nightly Fabricio Olmstead MD   80 mg at 06/13/19 2050    enoxaparin (LOVENOX) injection 40 mg  40 mg Subcutaneous Daily Jarret Evans MD             Allergies  No Known Allergies    REVIEW OF SYSTEMS     Within above limitations. 14 point review of systems reviewed. Pertinent positive or negative as per HPI or otherwise negative per 14 point systems review. Reviewed 6/14/2019 at 11:40 AM    PHYSICAL EXAM       Blood pressure 100/60, pulse 87, temperature 98 °F (36.7 °C), temperature source Oral, resp. rate 19, height 5' 9\" (1.753 m), weight 251 lb 12.3 oz (114.2 kg), last menstrual period 06/13/2018, SpO2 95 %. General - AAO x 3  Psych - Appropriate affect/speech. No agitation  Eyes - Eye lids intact. No scleral icterus  ENT - Lips wnl. External ear clear/dry/intact. No thyromegaly on inspection  Neuro - No gross peripheral or central neuro deficits on inspection  Heart - Sinus. RRR. S1 and S2 present. No elevated JVD appreciated  Lung - Adequate air entry b/l, No crackes/wheezes appreciated  GI - Soft. No guarding/rigidity. BS+   - No CVA/suprapubic tenderness or palpable bladder distension  Skin - Intact. No rash/petechiae/ecchymosis. Warm extremities  MSK - Joints with normal ROM.  No joint swellings        LABS AND IMAGING   CBC  [unfilled]    Last 3 Hemoglobin  Lab Results   Component Value Date    HGB 11.7 06/14/2019    HGB 13.0 06/13/2019     Last 3 WBC/ANC  Lab Results   Component Value Date    WBC 8.0 06/14/2019    WBC 11.2 06/13/2019     No components found for: GRNLOCTYABS  Last 3 Platelets  No results found for: PLATELET  Chemistry  [unfilled]  [unfilled]  No results found for: LDH  Coagulation Studies  No results found for: PTT, INR  Liver Function Studies  Lab Results   Component Value Date    ALT 13 06/13/2019    AST 19 06/13/2019    ALKPHOS 58 06/13/2019       Recent Imaging    XR CHEST PORTABLE [[id number]] Collected: 06/13/19 0504      Order Status: Completed Updated: 06/13/19 0508     Narrative:       EXAMINATION:  ONE XRAY VIEW OF THE CHEST    6/13/2019 4:48 am    COMPARISON:  10/20/2008    HISTORY:  ORDERING SYSTEM PROVIDED HISTORY: cp  TECHNOLOGIST PROVIDED HISTORY:  Reason for exam:->cp  Ordering Physician Provided Reason for Exam: cp  Acuity: Acute  Type of Exam: Initial  Mechanism of Injury: cp  Relevant Medical/Surgical History: cp    FINDINGS:  The lungs are adequately inflated.  There is no focal consolidation, pleural  effusion or pneumothorax.  The cardiac silhouette is mildly enlarged, but  stable.  No acute bony abnormality.     Impression:       No acute process.        EKG personally reviewed with rate 86, ST elevation anterolateral leads        Relevant labs and imaging reviewed    ASSESSMENT AND PLAN       NSTEMI  - s/p emergent PCI 6/13  - DAPT, statin, BB  - add statin  - completed IV aggrastat  - IVF  - TTE with LVEF 40-45%, apical dyskinesis   - tele, pulse ox monitoring of early post-stemi complications  - OOB, increase activity, possible home tomorrow if all is well    Tobacco abuse  - discussed chantix outpatient which she agrees to try     Lovenox ppx        [address] [facility] Internal Medicine  6/14/2019 at 11:40 AM

## 2019-06-14 NOTE — FLOWSHEET NOTE
Dr. Cristóbal osorio. Updated patient not having any chest pain or distress. Verbal orders received patient may transfer to 3N cardiac stepdown on dayshift. [address] Danie Stephens called and notified of pending transfer to 3N.     Latasha Hernandez RN 5:21 AM

## 2019-06-14 NOTE — PROGRESS NOTES
Seen by Cath Lab nurse. Patient is alert and oriented and conversational. Discussed STEMI; involving LAD artery and how this has affected the heart muscle. We discussed how to watch for signs and symptoms of Heart Failure and Pneumonia along with education on how to use the Heart Failure and Pneumonia stoplight. Reinforced the importance of early symptoms recognition and calling their doctor when in the yellow and red zone. Educational material left with patient in reference to discussed material. Pt and family voiced understood. Questions asked and answered.

## 2019-06-14 NOTE — PROGRESS NOTES
edema  Pulses; palpable  Neuro: grossly normal    Medications:    potassium chloride  40 mEq Oral Once    sodium chloride flush  10 mL Intravenous 2 times per day    aspirin  81 mg Oral Daily    prasugrel  10 mg Oral Daily    tirofiban  25 mcg/kg Intravenous Once    metoprolol tartrate  25 mg Oral BID    atorvastatin  80 mg Oral Nightly    enoxaparin  40 mg Subcutaneous Daily       sodium chloride flush, acetaminophen, magnesium hydroxide, ondansetron    Lab Data:  CBC:   Recent Labs     06/13/19 0425 06/14/19  0630   WBC 11.2* 8.0   HGB 13.0 11.7*   HCT 40.2 36.6*   MCV 93.1 94.6    342     BMP:   Recent Labs     06/13/19 0425      K 3.7      CO2 23   BUN 18   CREATININE 0.9     LIVER PROFILE:   Recent Labs     06/13/19 0425   AST 19   ALT 13   LIPASE 30   BILITOT 0.2   ALKPHOS 58     PT/INR: No results for input(s): PROTIME, INR in the last 72 hours. APTT: No results for input(s): APTT in the last 72 hours. BNP:  No results for input(s): BNP in the last 72 hours.   TROPONIN: @TROPONINI:3@  Labs, consult, tests reviewed    Assessment/ PLAN:    As above                  Kasandra Valera MD 6/14/2019 9:10 AM

## 2019-06-15 VITALS
BODY MASS INDEX: 37.44 KG/M2 | HEIGHT: 69 IN | WEIGHT: 252.8 LBS | OXYGEN SATURATION: 99 % | SYSTOLIC BLOOD PRESSURE: 94 MMHG | HEART RATE: 83 BPM | TEMPERATURE: 97.7 F | DIASTOLIC BLOOD PRESSURE: 54 MMHG | RESPIRATION RATE: 13 BRPM

## 2019-06-15 LAB
ANION GAP SERPL CALCULATED.3IONS-SCNC: 10 MMOL/L (ref 4–16)
BASOPHILS ABSOLUTE: 0.1 K/CU MM
BASOPHILS RELATIVE PERCENT: 1 % (ref 0–1)
BUN BLDV-MCNC: 15 MG/DL (ref 6–23)
CALCIUM SERPL-MCNC: 9.3 MG/DL (ref 8.3–10.6)
CHLORIDE BLD-SCNC: 106 MMOL/L (ref 99–110)
CO2: 20 MMOL/L (ref 21–32)
CREAT SERPL-MCNC: 0.8 MG/DL (ref 0.6–1.1)
DIFFERENTIAL TYPE: ABNORMAL
EOSINOPHILS ABSOLUTE: 0.3 K/CU MM
EOSINOPHILS RELATIVE PERCENT: 2.9 % (ref 0–3)
GFR AFRICAN AMERICAN: >60 ML/MIN/1.73M2
GFR NON-AFRICAN AMERICAN: >60 ML/MIN/1.73M2
GLUCOSE BLD-MCNC: 98 MG/DL (ref 70–99)
HCT VFR BLD CALC: 38.7 % (ref 37–47)
HEMOGLOBIN: 12 GM/DL (ref 12.5–16)
IMMATURE NEUTROPHIL %: 0.2 % (ref 0–0.43)
LYMPHOCYTES ABSOLUTE: 3.3 K/CU MM
LYMPHOCYTES RELATIVE PERCENT: 36.4 % (ref 24–44)
MAGNESIUM: 2 MG/DL (ref 1.8–2.4)
MCH RBC QN AUTO: 29.8 PG (ref 27–31)
MCHC RBC AUTO-ENTMCNC: 31 % (ref 32–36)
MCV RBC AUTO: 96 FL (ref 78–100)
MONOCYTES ABSOLUTE: 0.9 K/CU MM
MONOCYTES RELATIVE PERCENT: 9.8 % (ref 0–4)
NUCLEATED RBC %: 0 %
PDW BLD-RTO: 13.4 % (ref 11.7–14.9)
PLATELET # BLD: 371 K/CU MM (ref 140–440)
PMV BLD AUTO: 8.7 FL (ref 7.5–11.1)
POTASSIUM SERPL-SCNC: 4.2 MMOL/L (ref 3.5–5.1)
RBC # BLD: 4.03 M/CU MM (ref 4.2–5.4)
SEGMENTED NEUTROPHILS ABSOLUTE COUNT: 4.6 K/CU MM
SEGMENTED NEUTROPHILS RELATIVE PERCENT: 49.7 % (ref 36–66)
SODIUM BLD-SCNC: 136 MMOL/L (ref 135–145)
TOTAL IMMATURE NEUTOROPHIL: 0.02 K/CU MM
TOTAL NUCLEATED RBC: 0 K/CU MM
WBC # BLD: 9.2 K/CU MM (ref 4–10.5)

## 2019-06-15 PROCEDURE — 99232 SBSQ HOSP IP/OBS MODERATE 35: CPT | Performed by: INTERNAL MEDICINE

## 2019-06-15 PROCEDURE — 83735 ASSAY OF MAGNESIUM: CPT

## 2019-06-15 PROCEDURE — 85025 COMPLETE CBC W/AUTO DIFF WBC: CPT

## 2019-06-15 PROCEDURE — 80048 BASIC METABOLIC PNL TOTAL CA: CPT

## 2019-06-15 PROCEDURE — 6370000000 HC RX 637 (ALT 250 FOR IP): Performed by: INTERNAL MEDICINE

## 2019-06-15 PROCEDURE — 36415 COLL VENOUS BLD VENIPUNCTURE: CPT

## 2019-06-15 RX ADMIN — ASPIRIN 81 MG 81 MG: 81 TABLET ORAL at 10:53

## 2019-06-15 RX ADMIN — METOPROLOL TARTRATE 25 MG: 25 TABLET, FILM COATED ORAL at 10:53

## 2019-06-15 RX ADMIN — PRASUGREL 10 MG: 10 TABLET, FILM COATED ORAL at 10:53

## 2019-06-15 ASSESSMENT — PAIN SCALES - GENERAL: PAINLEVEL_OUTOF10: 0

## 2019-06-15 NOTE — PROGRESS NOTES
Cardiology Progress Note     Admit Date:  6/13/2019    Consult reason/ Seen today for :   POST STEMI    Subjective and  Overnight Events :  Echo shows EF of 40 % range      Chief complain on admission : 51 y. o.year old who is admitted for  Chief Complaint   Patient presents with    Chest Pain      Assessment / Plan:  ASCVD: Continue aspirin and effeint for atleast one yr, continue statins, ACEinhibitors, betablockers  cardiomyopathy , add lisinopriol  Cam be dose as outpatient once bp allows   HTN: stable, continue To titrate up medication as needed  DVT Prophylaxis if no contraindication  Follow up in office     Past medical history:    has a past medical history of MRSA infection. Past surgical history:   has a past surgical history that includes Cholecystectomy (1992) and skin biopsy. Social History:   reports that she has been smoking cigarettes. She has been smoking about 1.00 pack per day. She has never used smokeless tobacco. She reports that she drinks alcohol. She reports that she has current or past drug history. Drug: Marijuana. Frequency: 2.00 times per week. Family history:  family history includes Arthritis in her sister; Asthma in her sister; Coronary Art Dis in her father; Diabetes in her father; Heart Disease in her father; High Blood Pressure in her father; High Cholesterol in her father.     No Known Allergies    Review of Systems:    All 14 systems were reviewed and are negative  Except for the positive findings  which as documented     BP (!) 94/54   Pulse 83   Temp 97.7 °F (36.5 °C) (Axillary)   Resp 13   Ht 5' 9\" (1.753 m)   Wt 252 lb 12.8 oz (114.7 kg)   LMP 06/13/2018   SpO2 99%   BMI 37.33 kg/m²       Intake/Output Summary (Last 24 hours) at 6/15/2019 1508  Last data filed at 6/15/2019 0401  Gross per 24 hour   Intake --   Output 625 ml   Net -625 ml     Physical Exam:  Constitutional:  Well hours.     ECHO :   echocardiogram    Assessment:  51 y. o.year old who is admitted for  Chief Complaint   Patient presents with    Chest Pain    , active issues as noted below:  Impression:  Active Problems:    STEMI (ST elevation myocardial infarction) ([facility] [address]  Resolved Problems:    * No resolved hospital problems. *            All labs, medications and tests reviewed by myself , continue all other medications of all above medical condition listed as is except for changes mentioned above. Thank you very much for consult , please call with questions.     Iftikhar Arnold MD 6/15/2019 3:08 PM

## 2019-06-15 NOTE — DISCHARGE SUMMARY
[facility] 1970 [id number]  PCP:  Fabrice Running date: 6/13/2019  Admitting Physician: Andre Moe MD    Discharge date: 6/15/2019 Discharge Physician: Aurelia Borrego MD      Reason for admission:   Chief Complaint   Patient presents with    Chest Pain     Present on Admission:   STEMI (ST elevation myocardial infarction) [facility])       Discharge Diagnoses & Hospital Course[de-identified]          NSTEMI  - s/p emergent PCI 6/13  - DAPT, statin, BB, add statin  - completed IV aggrastat  - TTE with LVEF 40-45%, apical dyskinesis   - tele, pulse ox monitoring doing fair  - outpatient f/u with cardiology in 1 week    Tobacco abuse  - discussed chantix outpatient which she agrees to try             Exam:   Wt Readings from Last 3 Encounters:   06/15/19 252 lb 12.8 oz (114.7 kg)       Blood pressure 118/77, pulse 79, temperature 98.1 °F (36.7 °C), temperature source Oral, resp. rate 21, height 5' 9\" (1.753 m), weight 252 lb 12.8 oz (114.7 kg), last menstrual period 06/13/2018, SpO2 96 %. General - AAO x 3  Psych - Appropriate affect/speech. No agitation  Eyes - Eye lids intact. No scleral icterus  ENT - Lips wnl. External ear clear/dry/intact. No thyromegaly on inspection  Neuro - No gross peripheral or central neuro deficits on inspection  Heart - Sinus. RRR. S1 and S2 present. No elevated JVD appreciated  Lung - Adequate air entry b/l, No crackles/wheezes appreciated  GI -  Soft. No guarding/rigidity. No hepatosplenomegaly/ascites.  BS+        Significant Diagnostic Studies:   CBC:   Recent Labs     06/13/19  0425 06/14/19  0630 06/15/19  0454   WBC 11.2* 8.0 9.2   HGB 13.0 11.7* 12.0*    342 371     WBC   Date/Time Value Ref Range Status   06/15/2019 04:54 AM 9.2 4.0 - 10.5 K/CU MM Final   06/14/2019 06:30 AM 8.0 4.0 - 10.5 K/CU MM Final   06/13/2019 04:25 AM 11.2 (H) 4.0 - 10.5 K/CU MM Final     Hemoglobin   Date/Time Value Ref Range Status   06/15/2019 04:54 AM 12.0 (L) 12.5 - 16.0 GM/DL Final   06/14/2019 06:30 AM 11.7 (L) 12.5 - 16.0 GM/DL Final   06/13/2019 04:25 AM 13.0 12.5 - 16.0 GM/DL Final     Platelets   Date/Time Value Ref Range Status   06/15/2019 04:54  140 - 440 K/CU MM Final   06/14/2019 06:30  140 - 440 K/CU MM Final   06/13/2019 04:25  140 - 440 K/CU MM Final    CMP:  Recent Labs     06/13/19  0425 06/15/19  0454    136   K 3.7 4.2    106   CO2 23 20*   BUN 18 15   CREATININE 0.9 0.8   CALCIUM 9.4 9.3   PROT 6.9  --    LABALBU 4.1  --    BILITOT 0.2  --    ALKPHOS 58  --    AST 19  --    ALT 13  --      Troponin: No results for input(s): TROPONINI in the last 72 hours. BNP: No results for input(s): BNP in the last 72 hours. Lipids:   Recent Labs     06/14/19  0630   CHOL 160   HDL 48     ABGs:No results for input(s): PH, HVT8IJU, PO2ART, BE, ECQ4AMT, CO2CT, O2SAT, LABCARB in the last 72 hours. Invalid input(s): METHGBART    Glucose: No results for input(s): POCGLU in the last 72 hours. Magnesium:   Recent Labs     06/15/19  0454   MG 2.0     Phosphorus:No results for input(s): PHOS in the last 72 hours. INR: No results for input(s): INR in the last 72 hours.       Patient Instructions:   [facility] Medication Instructions STEVE:[id number]    Printed on:06/15/19 1016   Medication Information                      aspirin 81 MG EC tablet  Take 1 tablet by mouth daily for 90 doses             atorvastatin (LIPITOR) 80 MG tablet  Take 1 tablet by mouth nightly             ibuprofen (ADVIL;MOTRIN) 200 MG tablet  Take 200 mg by mouth every 6 hours as needed for Pain             metoprolol tartrate (LOPRESSOR) 25 MG tablet  Take 1 tablet by mouth 2 times daily             prasugrel (EFFIENT) 10 MG TABS  Take 1 tablet by mouth daily             varenicline (CHANTIX) 0.5 MG tablet  Take 1-2 tablets by mouth See Admin Instructions 0.5mg DAILY for 3 days followed by 0.5mg TWICE DAILY for 4 days followed by 1mg TWICE DAILY Code Status: Full Code     Consults:   IP CONSULT TO CARDIAC REHAB  IP CONSULT TO HOSPITALIST    Diet: cardiac diet    Activity: activity as tolerated   Work:    Discharged Condition: fair    Prognosis: Fair    Disposition: home      Follow-up with     Follow-up With  Details  [address] [place] [address]   Lissette Lopez MD  Schedule an appointment as soon as possible for a visit in 1 week  call to follow up with cardiologist in 1 week for post hospitalization care  [address] [facility] [address]  [phone]            Discharge Physician Signed:   Carli Nixon  Hospitalist, Internal medicine  6/15/2019 at 10:16 AM    The patient was seen and examined on day of discharge and this discharge summary is in conjunction with any daily progress note from day of discharge.   Time spent on discharge in the examination, evaluation, counseling and review of medications and discharge plan: <30 minutes    Please forward this discharge summary to patient's PCP

## 2019-06-18 ENCOUNTER — TELEPHONE (OUTPATIENT)
Dept: CARDIOLOGY CLINIC | Age: 49
End: 2019-06-18

## 2019-06-18 LAB
EKG ATRIAL RATE: 86 BPM
EKG DIAGNOSIS: NORMAL
EKG P AXIS: -3 DEGREES
EKG P-R INTERVAL: 140 MS
EKG Q-T INTERVAL: 406 MS
EKG QRS DURATION: 116 MS
EKG QTC CALCULATION (BAZETT): 485 MS
EKG R AXIS: 99 DEGREES
EKG T AXIS: 48 DEGREES
EKG VENTRICULAR RATE: 86 BPM

## 2019-06-18 NOTE — TELEPHONE ENCOUNTER
Pt has to wait until her follow up with Dr. Nicolette Fajardo on 06/26/2019 there is nothing in her chart from Dr. Nicolette Fajardo or any other doctor in our practice about returning to work

## 2019-06-26 ENCOUNTER — OFFICE VISIT (OUTPATIENT)
Dept: CARDIOLOGY CLINIC | Age: 49
End: 2019-06-26
Payer: MEDICARE

## 2019-06-26 VITALS
BODY MASS INDEX: 38.92 KG/M2 | WEIGHT: 256.8 LBS | HEIGHT: 68 IN | SYSTOLIC BLOOD PRESSURE: 116 MMHG | HEART RATE: 72 BPM | DIASTOLIC BLOOD PRESSURE: 78 MMHG

## 2019-06-26 DIAGNOSIS — Z98.61 POST PTCA: Primary | ICD-10-CM

## 2019-06-26 PROCEDURE — 1111F DSCHRG MED/CURRENT MED MERGE: CPT | Performed by: INTERNAL MEDICINE

## 2019-06-26 PROCEDURE — 99213 OFFICE O/P EST LOW 20 MIN: CPT | Performed by: INTERNAL MEDICINE

## 2019-06-26 PROCEDURE — 93000 ELECTROCARDIOGRAM COMPLETE: CPT | Performed by: INTERNAL MEDICINE

## 2019-06-26 PROCEDURE — G8427 DOCREV CUR MEDS BY ELIG CLIN: HCPCS | Performed by: INTERNAL MEDICINE

## 2019-06-26 PROCEDURE — G8419 CALC BMI OUT NRM PARAM NOF/U: HCPCS | Performed by: INTERNAL MEDICINE

## 2019-06-26 PROCEDURE — 4004F PT TOBACCO SCREEN RCVD TLK: CPT | Performed by: INTERNAL MEDICINE

## 2019-06-26 PROCEDURE — G8598 ASA/ANTIPLAT THER USED: HCPCS | Performed by: INTERNAL MEDICINE

## 2019-06-26 NOTE — PROGRESS NOTES
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 Patient is  compliant with prescribed medicines. - Hyperlipidimea, lipids are in acceptable range. Pt  is  compliant with medicines                  The patient does not have cardiac complaints    Nikko Dawkins has the following history recorded in care path:  Patient Active Problem List    Diagnosis Date Noted    STEMI (ST elevation myocardial infarction) ([facility] [address] 06/13/2019    STEMI involving right coronary artery [facility])      Current Outpatient Medications   Medication Sig Dispense Refill    atorvastatin (LIPITOR) 80 MG tablet Take 1 tablet by mouth nightly 90 tablet 0    metoprolol tartrate (LOPRESSOR) 25 MG tablet Take 1 tablet by mouth 2 times daily 180 tablet 0    prasugrel (EFFIENT) 10 MG TABS Take 1 tablet by mouth daily 90 tablet 0    aspirin 81 MG EC tablet Take 1 tablet by mouth daily for 90 doses 90 tablet 0    varenicline (CHANTIX) 0.5 MG tablet Take 1-2 tablets by mouth See Admin Instructions 0.5mg DAILY for 3 days followed by 0.5mg TWICE DAILY for 4 days followed by 1mg TWICE DAILY 57 tablet 0    ibuprofen (ADVIL;MOTRIN) 200 MG tablet Take 200 mg by mouth every 6 hours as needed for Pain       No current facility-administered medications for this visit. Allergies: Patient has no known allergies.   Past Medical History:   Diagnosis Date    MRSA infection      Past Surgical History:   Procedure Laterality Date    CHOLECYSTECTOMY  1992    SKIN BIOPSY      16 mrsa spot removals, head shoulder stomach back      As reviewed   Family History   Problem Relation Age of Onset    Diabetes Father     Coronary Art Dis Father     Heart Disease Father  High Cholesterol Father     High Blood Pressure Father     Arthritis Sister     Asthma Sister      Social History     Tobacco Use    Smoking status: Current Every Day Smoker     Packs/day: 0.25     Types: Cigarettes    Smokeless tobacco: Never Used    Tobacco comment: 3 cigarettes a day    Substance Use Topics    Alcohol use: Yes     Comment: occasional      Review of Systems:    Constitutional: Negative for diaphoresis and fatigue  Psychological:Negative for anxiety or depression  HENT: Negative for headaches, nasal congestion, sinus pain or vertigo  Eyes: Negative for visual disturbance. Endocrine: Negative for polydipsia/polyuria  Respiratory: Negative for shortness of breath  Cardiovascular: Negative for chest pain, dyspnea on exertion, claudication, edema, irregular heartbeat, murmur, palpitations or shortness of breath  Gastrointestinal: Negative for abdominal pain or heartburn  Genito-Urinary: Negative for urinary frequency/urgency  Musculoskeletal: Negative for muscle pain, muscular weakness, negative for pain in arm and leg or swelling in foot and leg  Neurological: Negative for dizziness, headaches, memory loss, numbness/tingling, visual changes, syncope  Dermatological: Negative for rash    Objective:  /78   Pulse 72   Ht 5' 8\" (1.727 m)   Wt 256 lb 12.8 oz (116.5 kg)   LMP 06/13/2018   BMI 39.05 kg/m²   Wt Readings from Last 3 Encounters:   06/26/19 256 lb 12.8 oz (116.5 kg)   06/15/19 252 lb 12.8 oz (114.7 kg)     Body mass index is 39.05 kg/m². GENERAL - Alert, oriented, pleasant, in no apparent distress. EYES: No jaundice, no conjunctival pallor. SKIN: It is warm & dry. No rashes. No Echhymosis    HEENT - No clinically significant abnormalities seen. Neck - Supple. No jugular venous distention noted. No carotid bruits. Cardiovascular - Normal S1 and S2 without obvious murmur or gallop. Extremities - No cyanosis, clubbing, or significant edema.     Pulmonary - No respiratory distress. No wheezes or rales. Abdomen - No masses, tenderness, or organomegaly. Musculoskeletal - No significant edema. No joint deformities. No muscle wasting. Neurologic - Cranial nerves II through XII are grossly intact. There were no gross focal neurologic abnormalities. Lab Review   No results found for: CKTOTAL, CKMB, CKMBINDEX, TROPONINT  BNP:  No results found for: BNP  PT/INR:  No results found for: INR  No results found for: LABA1C  Lab Results   Component Value Date    WBC 9.2 06/15/2019    HCT 38.7 06/15/2019    MCV 96.0 06/15/2019     06/15/2019     Lab Results   Component Value Date    CHOL 160 06/14/2019    TRIG 115 06/14/2019    HDL 48 06/14/2019    LDLDIRECT 114 (H) 06/14/2019     Lab Results   Component Value Date    ALT 13 06/13/2019    AST 19 06/13/2019     BMP:    Lab Results   Component Value Date     06/15/2019    K 4.2 06/15/2019     06/15/2019    CO2 20 06/15/2019    BUN 15 06/15/2019    CREATININE 0.8 06/15/2019     CMP:   Lab Results   Component Value Date     06/15/2019    K 4.2 06/15/2019     06/15/2019    CO2 20 06/15/2019    BUN 15 06/15/2019    PROT 6.9 06/13/2019     TSH:  No results found for: TSH, TSHHS        Impression:    1. Post PTCA       Patient Active Problem List   Diagnosis Code    STEMI involving right coronary artery ([facility] [address] I21.11    STEMI (ST elevation myocardial infarction) ([facility] [address] I21.3       Assessment & Plan:               -     CORONARY ARTERY DISEASE:  asymptomatic     All available  tests in chart reviewed. Management discussed . Testing ordered    ETT and rehab                               -  LIPID MANAGEMENT:  Available lipid  lab data reviewed  and patient was given dietary advice. NCEP- ATP III guidelines reviewed with patient.     -   Changes  in medicines made: No    - EF 45%                                          Gerber Del Rio MA  [facility]

## 2019-07-03 ENCOUNTER — TELEPHONE (OUTPATIENT)
Dept: CARDIOLOGY CLINIC | Age: 49
End: 2019-07-03

## 2019-08-29 NOTE — PROGRESS NOTES
2 more air removed TR BAND- pt tolerated removing air earlier- no more bleeding noted Condition:: Cosmetic Please Describe Your Condition:: Pt is interested in treatment options for browns on her face.  Pt is currently using Elta Md cleanser, tretinoin 0.05% and Elta Clear Tinted SPF.  Medications, allergies and  medical hx were reviewed.

## 2020-02-13 ENCOUNTER — OFFICE VISIT (OUTPATIENT)
Dept: FAMILY MEDICINE CLINIC | Age: 50
End: 2020-02-13
Payer: COMMERCIAL

## 2020-02-13 VITALS
SYSTOLIC BLOOD PRESSURE: 125 MMHG | BODY MASS INDEX: 40.84 KG/M2 | DIASTOLIC BLOOD PRESSURE: 80 MMHG | WEIGHT: 268.6 LBS | HEART RATE: 103 BPM | OXYGEN SATURATION: 96 %

## 2020-02-13 LAB
BASOPHILS ABSOLUTE: 0.1 K/UL (ref 0–0.2)
BASOPHILS RELATIVE PERCENT: 0.8 %
EOSINOPHILS ABSOLUTE: 0.2 K/UL (ref 0–0.6)
EOSINOPHILS RELATIVE PERCENT: 1.8 %
HCT VFR BLD CALC: 39.8 % (ref 36–48)
HEMOGLOBIN: 13 G/DL (ref 12–16)
LYMPHOCYTES ABSOLUTE: 2.1 K/UL (ref 1–5.1)
LYMPHOCYTES RELATIVE PERCENT: 22 %
MCH RBC QN AUTO: 29.2 PG (ref 26–34)
MCHC RBC AUTO-ENTMCNC: 32.7 G/DL (ref 31–36)
MCV RBC AUTO: 89.2 FL (ref 80–100)
MONOCYTES ABSOLUTE: 0.9 K/UL (ref 0–1.3)
MONOCYTES RELATIVE PERCENT: 9 %
NEUTROPHILS ABSOLUTE: 6.4 K/UL (ref 1.7–7.7)
NEUTROPHILS RELATIVE PERCENT: 66.4 %
PDW BLD-RTO: 16.4 % (ref 12.4–15.4)
PLATELET # BLD: 398 K/UL (ref 135–450)
PMV BLD AUTO: 6.8 FL (ref 5–10.5)
RBC # BLD: 4.46 M/UL (ref 4–5.2)
WBC # BLD: 9.6 K/UL (ref 4–11)

## 2020-02-13 PROCEDURE — 4004F PT TOBACCO SCREEN RCVD TLK: CPT | Performed by: FAMILY MEDICINE

## 2020-02-13 PROCEDURE — G8484 FLU IMMUNIZE NO ADMIN: HCPCS | Performed by: FAMILY MEDICINE

## 2020-02-13 PROCEDURE — 99204 OFFICE O/P NEW MOD 45 MIN: CPT | Performed by: FAMILY MEDICINE

## 2020-02-13 PROCEDURE — G8417 CALC BMI ABV UP PARAM F/U: HCPCS | Performed by: FAMILY MEDICINE

## 2020-02-13 PROCEDURE — G8427 DOCREV CUR MEDS BY ELIG CLIN: HCPCS | Performed by: FAMILY MEDICINE

## 2020-02-13 PROCEDURE — 36415 COLL VENOUS BLD VENIPUNCTURE: CPT | Performed by: FAMILY MEDICINE

## 2020-02-13 RX ORDER — PRASUGREL 10 MG/1
10 TABLET, FILM COATED ORAL DAILY
Qty: 90 TABLET | Refills: 3 | Status: SHIPPED | OUTPATIENT
Start: 2020-02-13 | End: 2020-10-06

## 2020-02-13 RX ORDER — ATORVASTATIN CALCIUM 80 MG/1
80 TABLET, FILM COATED ORAL NIGHTLY
Qty: 90 TABLET | Refills: 3 | Status: SHIPPED
Start: 2020-02-13 | End: 2020-10-06 | Stop reason: SINTOL

## 2020-02-13 ASSESSMENT — PATIENT HEALTH QUESTIONNAIRE - PHQ9
SUM OF ALL RESPONSES TO PHQ QUESTIONS 1-9: 0
1. LITTLE INTEREST OR PLEASURE IN DOING THINGS: 0
2. FEELING DOWN, DEPRESSED OR HOPELESS: 0
SUM OF ALL RESPONSES TO PHQ9 QUESTIONS 1 & 2: 0
SUM OF ALL RESPONSES TO PHQ QUESTIONS 1-9: 0

## 2020-02-13 NOTE — PROGRESS NOTES
Gina Irina  1970 02/16/20    Chief Complaint   Patient presents with    New Patient     Patient here to establish care with PCP    Other     Patient has not been taking any medications            51 yrs old lady with recent history of CAD with 3  stents placement last June, patient started on lipitor, metoprolol, and effinit, patient stats f/u with the cardiology, but she lost her insurance and she is out of medication for few months, patient also c/o: Hip Pain    There was no injury mechanism. The pain is present in the left hip and right hip (for years). The quality of the pain is described as aching. The pain is at a severity of 4/10. The pain is moderate. The pain has been constant since onset. The symptoms are aggravated by movement. She has tried NSAIDs for the symptoms. The treatment provided mild relief.        Past Medical History:   Diagnosis Date    MRSA infection      Past Surgical History:   Procedure Laterality Date    CHOLECYSTECTOMY  1992    SKIN BIOPSY      16 mrsa spot removals, head shoulder stomach back     Family History   Problem Relation Age of Onset    Diabetes Father     Coronary Art Dis Father     Heart Disease Father     High Cholesterol Father     High Blood Pressure Father     Arthritis Sister     Asthma Sister      Social History     Socioeconomic History    Marital status: Legally      Spouse name: Nicolasa Wynne    Number of children: 1    Years of education: 15    Highest education level: Not on file   Occupational History    Not on file   Social Needs    Financial resource strain: Not on file    Food insecurity:     Worry: Not on file     Inability: Not on file   [employer] needs:     Medical: Not on file     Non-medical: Not on file   Tobacco Use    Smoking status: Current Every Day Smoker     Packs/day: 0.25     Types: Cigarettes    Smokeless tobacco: Never Used    Tobacco comment: 3 cigarettes a day    Substance and Sexual Activity  Alcohol use: Yes     Comment: occasional    Drug use: Yes     Frequency: 2.0 times per week     Types: Marijuana     Comment: none since heart attack     Sexual activity: Not Currently   Lifestyle    Physical activity:     Days per week: Not on file     Minutes per session: Not on file    Stress: Not on file   Relationships    Social connections:     Talks on phone: Not on file     Gets together: Not on file     Attends [employer] service: Not on file     Active member of club or organization: Not on file     Attends meetings of clubs or organizations: Not on file     Relationship status: Not on file    Intimate partner violence:     Fear of current or ex partner: Not on file     Emotionally abused: Not on file     Physically abused: Not on file     Forced sexual activity: Not on file   Other Topics Concern    Not on file   Social History Narrative    Not on file       No Known Allergies  Current Outpatient Medications   Medication Sig Dispense Refill    atorvastatin (LIPITOR) 80 MG tablet Take 1 tablet by mouth nightly 90 tablet 3    metoprolol tartrate (LOPRESSOR) 25 MG tablet Take 1 tablet by mouth 2 times daily 180 tablet 3    prasugrel (EFFIENT) 10 MG TABS Take 1 tablet by mouth daily 90 tablet 3    nicotine (NICODERM CQ) 7 MG/24HR Place 1 patch onto the skin every 24 hours 30 patch 3    aspirin 81 MG EC tablet Take 1 tablet by mouth daily for 90 doses 90 tablet 0    ibuprofen (ADVIL;MOTRIN) 200 MG tablet Take 200 mg by mouth every 6 hours as needed for Pain       No current facility-administered medications for this visit. Review of Systems   Constitutional: Negative for activity change, appetite change, chills, fatigue and fever. HENT: Negative for congestion, postnasal drip, sinus pressure and sore throat. Respiratory: Negative for cough, chest tightness, shortness of breath and wheezing. Cardiovascular: Negative for chest pain and leg swelling.    Gastrointestinal: Negative for abdominal pain, constipation and diarrhea. Genitourinary: Negative for dysuria and frequency. Musculoskeletal: Negative for back pain, gait problem and neck pain. Skin: Negative for rash. Neurological: Negative for dizziness, weakness and headaches. Psychiatric/Behavioral: Negative for agitation, behavioral problems and sleep disturbance. The patient is not nervous/anxious. Lab Results   Component Value Date    WBC 9.6 02/13/2020    HGB 13.0 02/13/2020    HCT 39.8 02/13/2020    MCV 89.2 02/13/2020     02/13/2020     Lab Results   Component Value Date     06/15/2019    K 4.2 06/15/2019     06/15/2019    CO2 20 (L) 06/15/2019    BUN 15 06/15/2019    CREATININE 0.8 06/15/2019    GLUCOSE 98 06/15/2019    CALCIUM 9.3 06/15/2019    PROT 6.9 06/13/2019    LABALBU 4.1 06/13/2019    BILITOT 0.2 06/13/2019    ALKPHOS 58 06/13/2019    AST 19 06/13/2019    ALT 13 06/13/2019    LABGLOM >60 06/15/2019    GFRAA >60 06/15/2019     Lab Results   Component Value Date    CHOL 160 06/14/2019     Lab Results   Component Value Date    TRIG 115 06/14/2019     Lab Results   Component Value Date    HDL 48 06/14/2019     No results found for: LDLCALC, LDLCHOLESTEROL  Lab Results   Component Value Date    LABA1C 5.6 02/13/2020     Lab Results   Component Value Date    TSH 2.01 02/13/2020         /80   Pulse 103   Wt 268 lb 9.6 oz (121.8 kg)   LMP 06/13/2018   SpO2 96%   BMI 40.84 kg/m²     BP Readings from Last 3 Encounters:   02/13/20 125/80   06/26/19 116/78   06/15/19 (!) 94/54       Wt Readings from Last 3 Encounters:   02/13/20 268 lb 9.6 oz (121.8 kg)   06/26/19 256 lb 12.8 oz (116.5 kg)   06/15/19 252 lb 12.8 oz (114.7 kg)         Physical Exam  Constitutional:       General: She is not in acute distress. Appearance: Normal appearance. She is well-developed. She is obese. She is not diaphoretic. HENT:      Head: Normocephalic and atraumatic.       Right Ear: Tympanic membrane, ear canal and external ear normal. There is no impacted cerumen. Left Ear: Tympanic membrane, ear canal and external ear normal. There is no impacted cerumen. Nose: Nose normal. No congestion. Mouth/Throat:      Mouth: Mucous membranes are moist.      Pharynx: No oropharyngeal exudate or posterior oropharyngeal erythema. Neck:      Musculoskeletal: Normal range of motion and neck supple. Cardiovascular:      Rate and Rhythm: Normal rate and regular rhythm. Heart sounds: Normal heart sounds. No murmur. Pulmonary:      Effort: Pulmonary effort is normal.      Breath sounds: Normal breath sounds. No wheezing. Abdominal:      General: There is no distension. Palpations: Abdomen is soft. There is no mass. Tenderness: There is no abdominal tenderness. Musculoskeletal: Normal range of motion. Right lower leg: No edema. Left lower leg: No edema. Neurological:      General: No focal deficit present. Mental Status: She is alert and oriented to person, place, and time. Cranial Nerves: No cranial nerve deficit. Psychiatric:         Mood and Affect: Mood normal.         Behavior: Behavior normal.         Thought Content: Thought content normal.         Judgment: Judgment normal.         ASSESSMENT/ PLAN:    1. Coronary artery disease involving native coronary artery of native heart without angina pectoris  - stable  - atorvastatin (LIPITOR) 80 MG tablet; Take 1 tablet by mouth nightly  Dispense: 90 tablet; Refill: 3  - metoprolol tartrate (LOPRESSOR) 25 MG tablet; Take 1 tablet by mouth 2 times daily  Dispense: 180 tablet; Refill: 3  - prasugrel (EFFIENT) 10 MG TABS; Take 1 tablet by mouth daily  Dispense: 90 tablet; Refill: 3  - TSH without Reflex  - T4, Free  - Hemoglobin A1C    2. Anemia, unspecified type  - recheck:  - CBC Auto Differential  - VITAMIN B12 & FOLATE; Future  - Vitamin B12 & Folate    3.  Pain of both hip joints  - chronic, will order:  - XR HIP

## 2020-02-14 LAB
ESTIMATED AVERAGE GLUCOSE: 114 MG/DL
FOLATE: 4.89 NG/ML (ref 4.78–24.2)
HBA1C MFR BLD: 5.6 %
T4 FREE: 1.2 NG/DL (ref 0.9–1.8)
TSH SERPL DL<=0.05 MIU/L-ACNC: 2.01 UIU/ML (ref 0.27–4.2)
VITAMIN B-12: 325 PG/ML (ref 211–911)

## 2020-02-16 ASSESSMENT — ENCOUNTER SYMPTOMS
SHORTNESS OF BREATH: 0
ABDOMINAL PAIN: 0
CONSTIPATION: 0
BACK PAIN: 0
SORE THROAT: 0
SINUS PRESSURE: 0
COUGH: 0
DIARRHEA: 0
CHEST TIGHTNESS: 0
WHEEZING: 0

## 2020-06-28 ENCOUNTER — APPOINTMENT (OUTPATIENT)
Dept: ULTRASOUND IMAGING | Age: 50
End: 2020-06-28
Payer: COMMERCIAL

## 2020-06-28 ENCOUNTER — APPOINTMENT (OUTPATIENT)
Dept: GENERAL RADIOLOGY | Age: 50
End: 2020-06-28
Payer: COMMERCIAL

## 2020-06-28 ENCOUNTER — HOSPITAL ENCOUNTER (OUTPATIENT)
Age: 50
Setting detail: OBSERVATION
Discharge: HOME | End: 2020-07-02
Attending: INTERNAL MEDICINE | Admitting: INTERNAL MEDICINE
Payer: COMMERCIAL

## 2020-06-28 PROBLEM — R07.9 CHEST PAIN: Status: ACTIVE | Noted: 2020-06-28

## 2020-06-28 LAB
ALBUMIN SERPL-MCNC: 4 GM/DL (ref 3.4–5)
ALP BLD-CCNC: 71 IU/L (ref 40–128)
ALT SERPL-CCNC: 13 U/L (ref 10–40)
ANION GAP SERPL CALCULATED.3IONS-SCNC: 10 MMOL/L (ref 4–16)
AST SERPL-CCNC: 17 IU/L (ref 15–37)
BASOPHILS ABSOLUTE: 0.1 K/CU MM
BASOPHILS RELATIVE PERCENT: 1 % (ref 0–1)
BILIRUB SERPL-MCNC: 0.4 MG/DL (ref 0–1)
BUN BLDV-MCNC: 18 MG/DL (ref 6–23)
CALCIUM SERPL-MCNC: 9.4 MG/DL (ref 8.3–10.6)
CHLORIDE BLD-SCNC: 104 MMOL/L (ref 99–110)
CO2: 25 MMOL/L (ref 21–32)
CREAT SERPL-MCNC: 1.1 MG/DL (ref 0.6–1.1)
DIFFERENTIAL TYPE: ABNORMAL
EOSINOPHILS ABSOLUTE: 0.2 K/CU MM
EOSINOPHILS RELATIVE PERCENT: 2.4 % (ref 0–3)
GFR AFRICAN AMERICAN: >60 ML/MIN/1.73M2
GFR NON-AFRICAN AMERICAN: 53 ML/MIN/1.73M2
GLUCOSE BLD-MCNC: 104 MG/DL (ref 70–99)
HCT VFR BLD CALC: 38.2 % (ref 37–47)
HEMOGLOBIN: 12.5 GM/DL (ref 12.5–16)
IMMATURE NEUTROPHIL %: 0.2 % (ref 0–0.43)
LYMPHOCYTES ABSOLUTE: 2.4 K/CU MM
LYMPHOCYTES RELATIVE PERCENT: 27.3 % (ref 24–44)
MCH RBC QN AUTO: 29.6 PG (ref 27–31)
MCHC RBC AUTO-ENTMCNC: 32.7 % (ref 32–36)
MCV RBC AUTO: 90.3 FL (ref 78–100)
MONOCYTES ABSOLUTE: 0.7 K/CU MM
MONOCYTES RELATIVE PERCENT: 8.4 % (ref 0–4)
NUCLEATED RBC %: 0 %
PDW BLD-RTO: 14.8 % (ref 11.7–14.9)
PLATELET # BLD: 369 K/CU MM (ref 140–440)
PMV BLD AUTO: 8.8 FL (ref 7.5–11.1)
POTASSIUM SERPL-SCNC: 3.7 MMOL/L (ref 3.5–5.1)
PRO-BNP: 75.84 PG/ML
RBC # BLD: 4.23 M/CU MM (ref 4.2–5.4)
SEGMENTED NEUTROPHILS ABSOLUTE COUNT: 5.4 K/CU MM
SEGMENTED NEUTROPHILS RELATIVE PERCENT: 60.7 % (ref 36–66)
SODIUM BLD-SCNC: 139 MMOL/L (ref 135–145)
TOTAL IMMATURE NEUTOROPHIL: 0.02 K/CU MM
TOTAL NUCLEATED RBC: 0 K/CU MM
TOTAL PROTEIN: 7.1 GM/DL (ref 6.4–8.2)
TROPONIN T: <0.01 NG/ML
WBC # BLD: 8.9 K/CU MM (ref 4–10.5)

## 2020-06-28 PROCEDURE — 94761 N-INVAS EAR/PLS OXIMETRY MLT: CPT

## 2020-06-28 PROCEDURE — 93005 ELECTROCARDIOGRAM TRACING: CPT | Performed by: PHYSICIAN ASSISTANT

## 2020-06-28 PROCEDURE — G0378 HOSPITAL OBSERVATION PER HR: HCPCS

## 2020-06-28 PROCEDURE — 83880 ASSAY OF NATRIURETIC PEPTIDE: CPT

## 2020-06-28 PROCEDURE — 80053 COMPREHEN METABOLIC PANEL: CPT

## 2020-06-28 PROCEDURE — 2580000003 HC RX 258: Performed by: PHYSICIAN ASSISTANT

## 2020-06-28 PROCEDURE — 6360000002 HC RX W HCPCS: Performed by: PHYSICIAN ASSISTANT

## 2020-06-28 PROCEDURE — 84484 ASSAY OF TROPONIN QUANT: CPT

## 2020-06-28 PROCEDURE — 93970 EXTREMITY STUDY: CPT

## 2020-06-28 PROCEDURE — 71045 X-RAY EXAM CHEST 1 VIEW: CPT

## 2020-06-28 PROCEDURE — 85025 COMPLETE CBC W/AUTO DIFF WBC: CPT

## 2020-06-28 PROCEDURE — 6370000000 HC RX 637 (ALT 250 FOR IP): Performed by: PHYSICIAN ASSISTANT

## 2020-06-28 PROCEDURE — 99285 EMERGENCY DEPT VISIT HI MDM: CPT

## 2020-06-28 RX ORDER — SODIUM CHLORIDE 9 MG/ML
INJECTION, SOLUTION INTRAVENOUS CONTINUOUS
Status: DISCONTINUED | OUTPATIENT
Start: 2020-06-28 | End: 2020-06-28

## 2020-06-28 RX ORDER — PRASUGREL 10 MG/1
10 TABLET, FILM COATED ORAL DAILY
Status: DISCONTINUED | OUTPATIENT
Start: 2020-06-29 | End: 2020-07-02 | Stop reason: HOSPADM

## 2020-06-28 RX ORDER — ACETAMINOPHEN 650 MG/1
650 SUPPOSITORY RECTAL EVERY 6 HOURS PRN
Status: DISCONTINUED | OUTPATIENT
Start: 2020-06-28 | End: 2020-07-02 | Stop reason: HOSPADM

## 2020-06-28 RX ORDER — ATORVASTATIN CALCIUM 40 MG/1
80 TABLET, FILM COATED ORAL NIGHTLY
Status: DISCONTINUED | OUTPATIENT
Start: 2020-06-28 | End: 2020-07-02 | Stop reason: HOSPADM

## 2020-06-28 RX ORDER — ONDANSETRON 2 MG/ML
4 INJECTION INTRAMUSCULAR; INTRAVENOUS EVERY 6 HOURS PRN
Status: DISCONTINUED | OUTPATIENT
Start: 2020-06-28 | End: 2020-07-02 | Stop reason: HOSPADM

## 2020-06-28 RX ORDER — ASPIRIN 81 MG/1
81 TABLET ORAL DAILY
Status: DISCONTINUED | OUTPATIENT
Start: 2020-06-29 | End: 2020-07-02 | Stop reason: HOSPADM

## 2020-06-28 RX ORDER — MORPHINE SULFATE 4 MG/ML
2 INJECTION, SOLUTION INTRAMUSCULAR; INTRAVENOUS EVERY 4 HOURS PRN
Status: DISCONTINUED | OUTPATIENT
Start: 2020-06-28 | End: 2020-07-01

## 2020-06-28 RX ORDER — ASPIRIN 81 MG/1
324 TABLET, CHEWABLE ORAL ONCE
Status: COMPLETED | OUTPATIENT
Start: 2020-06-28 | End: 2020-06-28

## 2020-06-28 RX ORDER — ACETAMINOPHEN 325 MG/1
650 TABLET ORAL EVERY 6 HOURS PRN
Status: DISCONTINUED | OUTPATIENT
Start: 2020-06-28 | End: 2020-07-02 | Stop reason: HOSPADM

## 2020-06-28 RX ORDER — METOPROLOL TARTRATE 50 MG/1
25 TABLET, FILM COATED ORAL 2 TIMES DAILY
Status: DISCONTINUED | OUTPATIENT
Start: 2020-06-28 | End: 2020-07-02 | Stop reason: HOSPADM

## 2020-06-28 RX ORDER — SODIUM CHLORIDE 0.9 % (FLUSH) 0.9 %
10 SYRINGE (ML) INJECTION PRN
Status: DISCONTINUED | OUTPATIENT
Start: 2020-06-28 | End: 2020-07-02 | Stop reason: HOSPADM

## 2020-06-28 RX ORDER — POLYETHYLENE GLYCOL 3350 17 G/17G
17 POWDER, FOR SOLUTION ORAL DAILY PRN
Status: DISCONTINUED | OUTPATIENT
Start: 2020-06-28 | End: 2020-07-02 | Stop reason: HOSPADM

## 2020-06-28 RX ORDER — FENTANYL CITRATE 50 UG/ML
50 INJECTION, SOLUTION INTRAMUSCULAR; INTRAVENOUS ONCE
Status: COMPLETED | OUTPATIENT
Start: 2020-06-28 | End: 2020-06-28

## 2020-06-28 RX ORDER — PROMETHAZINE HYDROCHLORIDE 25 MG/1
12.5 TABLET ORAL EVERY 6 HOURS PRN
Status: DISCONTINUED | OUTPATIENT
Start: 2020-06-28 | End: 2020-07-02 | Stop reason: HOSPADM

## 2020-06-28 RX ORDER — SODIUM CHLORIDE 0.9 % (FLUSH) 0.9 %
10 SYRINGE (ML) INJECTION EVERY 12 HOURS SCHEDULED
Status: DISCONTINUED | OUTPATIENT
Start: 2020-06-28 | End: 2020-07-02 | Stop reason: HOSPADM

## 2020-06-28 RX ADMIN — NITROGLYCERIN 1 INCH: 20 OINTMENT TOPICAL at 20:59

## 2020-06-28 RX ADMIN — ASPIRIN 324 MG: 81 TABLET, CHEWABLE ORAL at 18:25

## 2020-06-28 RX ADMIN — SODIUM CHLORIDE: 9 INJECTION, SOLUTION INTRAVENOUS at 19:52

## 2020-06-28 RX ADMIN — FENTANYL CITRATE 50 MCG: 50 INJECTION INTRAMUSCULAR; INTRAVENOUS at 19:52

## 2020-06-28 ASSESSMENT — PAIN SCALES - GENERAL
PAINLEVEL_OUTOF10: 7
PAINLEVEL_OUTOF10: 8
PAINLEVEL_OUTOF10: 0
PAINLEVEL_OUTOF10: 10
PAINLEVEL_OUTOF10: 7
PAINLEVEL_OUTOF10: 7

## 2020-06-28 ASSESSMENT — PAIN DESCRIPTION - LOCATION
LOCATION: LEG
LOCATION: LEG

## 2020-06-28 ASSESSMENT — PAIN DESCRIPTION - DESCRIPTORS
DESCRIPTORS: THROBBING;SORE
DESCRIPTORS: ACHING;PRESSURE

## 2020-06-28 ASSESSMENT — PAIN DESCRIPTION - PAIN TYPE
TYPE: ACUTE PAIN
TYPE: ACUTE PAIN

## 2020-06-28 ASSESSMENT — PAIN DESCRIPTION - ONSET: ONSET: PROGRESSIVE

## 2020-06-28 ASSESSMENT — PAIN DESCRIPTION - ORIENTATION
ORIENTATION: RIGHT;LEFT
ORIENTATION: RIGHT;LEFT;LOWER

## 2020-06-28 ASSESSMENT — PAIN DESCRIPTION - PROGRESSION: CLINICAL_PROGRESSION: GRADUALLY WORSENING

## 2020-06-28 ASSESSMENT — PAIN DESCRIPTION - FREQUENCY: FREQUENCY: INTERMITTENT

## 2020-06-28 ASSESSMENT — HEART SCORE: ECG: 1

## 2020-06-28 NOTE — ED PROVIDER NOTES
eMERGENCY dEPARTMENT eNCOUnter        PCP: Ant Harris MD    CHIEF COMPLAINT    Chief Complaint   Patient presents with    Chest Pain     Pt was not seen by physician    HPI    Theresa Puri is a 53 y.o. female who presents with chest pain as well as bilateral leg pain and swelling. Patient reports that she has had ongoing bilateral leg pain for some time is following with family doctor however she has had 4 days of some swelling to both legs worse on the left. Denies injury. She denies bowel or bladder incontinence, saddle paresthesias. Patient does report that a couple hours prior to ED presentation she was sitting at rest when she developed a substernal chest pressure that has been unchanged since onset. She does have history of hypertension, high cholesterol is a current smoker. She did have STEMI last year that required 2 stents. Follows with cardiologist, Dr. Karrie Sever and Dr. Gale Barger. Patient denied any associated nausea, diaphoresis, shortness of breath. Denies risk factors for DVT PE.      REVIEW OF SYSTEMS    Constitutional:  Denies fever, chills. HENT:  Denies sore throat or ear pain   Cardiovascular:  See HPI. Denies palpitations,  Denies syncope   Respiratory:    See HPI.   Denies shortness of breath, or hemoptysis    GI:  Denies abdominal pain, nausea, vomiting, or diarrhea  :  Denies any urinary symptoms   Musculoskeletal:   chronic back pain,   Skin:  Denies rash  Neurologic:  Denies lightheadedness, dizziness, headache, focal weakness or sensory changes   Endocrine:  Denies polyuria or polydypsia   Lymphatic:  Denies swollen glands     All other review of systems are negative  See HPI and nursing notes for additional information     PAST MEDICAL & SURGICAL HISTORY    Past Medical History:   Diagnosis Date    CAD (coronary artery disease)     MRSA infection      Past Surgical History:   Procedure Laterality Date    CHOLECYSTECTOMY  12    CORONARY ANGIOPLASTY WITH STENT PLACEMENT Fear of current or ex partner: None     Emotionally abused: None     Physically abused: None     Forced sexual activity: None   Other Topics Concern    None   Social History Narrative    None     Family History   Problem Relation Age of Onset    Diabetes Father     Coronary Art Dis Father     Heart Disease Father     High Cholesterol Father     High Blood Pressure Father     Arthritis Sister     Asthma Sister        PHYSICAL EXAM    VITAL SIGNS: /74   Pulse 87   Temp 97.3 °F (36.3 °C)   Resp 19   Ht 5' 9\" (1.753 m)   Wt 269 lb (122 kg)   LMP 06/13/2018   SpO2 99%   BMI 39.72 kg/m²    Constitutional:  Well developed, well nourished, no acute distress   HENT:  Atraumatic, moist mucus membranes  Neck/Lymphatics: supple, no JVD, no swollen nodes  Respiratory:  Lungs Clear, no retractions   Cardiovascular:  Rate regular, regular Rhythm,  no murmurs/rubs/gallops  No carotid bruits or murmurs heard in carotids. Vascular: Radial pulses 2+ equal bilaterally  DP and PT pulses 2+ bilaterally  GI:  Soft, nontender, normal bowel sounds  Musculoskeletal:   No appreciable edema, no deformities  Integument:  Skin warm and dry, no petechiae   Neurologic:  Alert & oriented, normal speech  Psych: Pleasant affect, no hallucinations      EKG Interpretation  Please see ED physician's note for EKG interpretation        RADIOLOGY/PROCEDURES    CXR:   Xr Chest Portable    Result Date: 6/28/2020  EXAMINATION: ONE XRAY VIEW OF THE CHEST 6/28/2020 6:03 pm COMPARISON: 06/13/2019 HISTORY: ORDERING SYSTEM PROVIDED HISTORY: chest pain TECHNOLOGIST PROVIDED HISTORY: Reason for exam:->chest pain Reason for Exam: chest tightness Acuity: Acute Type of Exam: Initial Additional signs and symptoms: leg swelling Relevant Medical/Surgical History: H/O heart attack FINDINGS: There is enlarged cardiac silhouette stable since prior. Right hilar prominence is also stable. No overt pulmonary edema. No pneumothorax or pleural effusion. No definite acute focal process. Stable examination with cardiomegaly. No evidence of pulmonary edema. Ct Chest Pulmonary Embolism W Contrast    Result Date: 6/29/2020  EXAMINATION: CTA OF THE CHEST 6/29/2020 12:44 am TECHNIQUE: CTA of the chest was performed after the administration of intravenous contrast.  Multiplanar reformatted images are provided for review. MIP images are provided for review. Dose modulation, iterative reconstruction, and/or weight based adjustment of the mA/kV was utilized to reduce the radiation dose to as low as reasonably achievable. COMPARISON: None. HISTORY: ORDERING SYSTEM PROVIDED HISTORY: Chest pain and SOB. r/o PE TECHNOLOGIST PROVIDED HISTORY: Reason for exam:->Chest pain and SOB. r/o PE Reason for Exam: jeyson d-dimer/sob FINDINGS: Pulmonary Arteries: Pulmonary arteries are adequately opacified for evaluation. No evidence of intraluminal filling defect to suggest pulmonary embolism. Main pulmonary artery is normal in caliber. Mediastinum: No evidence of mediastinal lymphadenopathy. The heart and pericardium demonstrate no acute abnormality. There is no acute abnormality of the thoracic aorta. Lungs/pleura: The lungs are without acute process. No focal consolidation or pulmonary edema. No evidence of pleural effusion or pneumothorax. Upper Abdomen: There is a small hiatal hernia. The patient is status post cholecystectomy. Limited images of the upper abdomen are otherwise unremarkable. Soft Tissues/Bones: No acute bone or soft tissue abnormality. No evidence of pulmonary embolism or acute pulmonary abnormality. Small hiatal hernia. Vl Dup Lower Extremity Venous Bilateral    Result Date: 6/28/2020  EXAMINATION: DUPLEX VENOUS ULTRASOUND OF THE BILATERAL LOWER EXTREMITIES, 6/28/2020 7:18 pm TECHNIQUE: Duplex ultrasound and Doppler images were obtained of the bilateral lower extremities COMPARISON: None.  HISTORY: ORDERING SYSTEM PROVIDED HISTORY: swelling, pain chest pressure. Considering her history as well as continued pain will admit her for ACS rule out. Patient is agreement with this plan. I did speak to the hospitalist service who graciously agreed to admit. Comment: Please note this report has been produced using speech recognition software and may contain errors related to that system including errors in grammar, punctuation, and spelling, as well as words and phrases that may be inappropriate. If there are any questions or concerns please feel free to contact the dictating provider for clarification.       Bill Bear PA-C  06/29/20 [id number]

## 2020-06-28 NOTE — ED PROVIDER NOTES
12 lead EKG per my interpretation:  Normal Sinus Rhythm with PVCs at 80  [place] is   Rightward axis  QTc is  within an acceptable range  There is no specific T wave changes appreciated. There is no specific ST wave changes appreciated. No STEMI    Prior EKG to compare with was available and no clinically significant change in overall morphology.     Millie Garcia MD  06/28/20 [id number]

## 2020-06-28 NOTE — ED TRIAGE NOTES
Pt presents to ED by EMS for chest pain that started around 1500 and bilateral leg/foot swelling that has been ongoing for 4 days. Pt states it has become painful to walk due to swelling.

## 2020-06-29 ENCOUNTER — APPOINTMENT (OUTPATIENT)
Dept: CT IMAGING | Age: 50
End: 2020-06-29
Payer: COMMERCIAL

## 2020-06-29 LAB
TROPONIN T: <0.01 NG/ML
TROPONIN T: <0.01 NG/ML

## 2020-06-29 PROCEDURE — 6360000002 HC RX W HCPCS: Performed by: INTERNAL MEDICINE

## 2020-06-29 PROCEDURE — 2580000003 HC RX 258: Performed by: INTERNAL MEDICINE

## 2020-06-29 PROCEDURE — 36415 COLL VENOUS BLD VENIPUNCTURE: CPT

## 2020-06-29 PROCEDURE — 96372 THER/PROPH/DIAG INJ SC/IM: CPT

## 2020-06-29 PROCEDURE — 99214 OFFICE O/P EST MOD 30 MIN: CPT | Performed by: INTERNAL MEDICINE

## 2020-06-29 PROCEDURE — 6370000000 HC RX 637 (ALT 250 FOR IP): Performed by: PHYSICIAN ASSISTANT

## 2020-06-29 PROCEDURE — 6360000004 HC RX CONTRAST MEDICATION: Performed by: INTERNAL MEDICINE

## 2020-06-29 PROCEDURE — 6370000000 HC RX 637 (ALT 250 FOR IP): Performed by: INTERNAL MEDICINE

## 2020-06-29 PROCEDURE — APPNB45 APP NON BILLABLE 31-45 MINUTES: Performed by: NURSE PRACTITIONER

## 2020-06-29 PROCEDURE — 84484 ASSAY OF TROPONIN QUANT: CPT

## 2020-06-29 PROCEDURE — G0378 HOSPITAL OBSERVATION PER HR: HCPCS

## 2020-06-29 PROCEDURE — 94640 AIRWAY INHALATION TREATMENT: CPT

## 2020-06-29 PROCEDURE — 96375 TX/PRO/DX INJ NEW DRUG ADDON: CPT

## 2020-06-29 PROCEDURE — 96374 THER/PROPH/DIAG INJ IV PUSH: CPT

## 2020-06-29 PROCEDURE — 93010 ELECTROCARDIOGRAM REPORT: CPT | Performed by: INTERNAL MEDICINE

## 2020-06-29 PROCEDURE — 94761 N-INVAS EAR/PLS OXIMETRY MLT: CPT

## 2020-06-29 PROCEDURE — 96376 TX/PRO/DX INJ SAME DRUG ADON: CPT

## 2020-06-29 PROCEDURE — 71260 CT THORAX DX C+: CPT

## 2020-06-29 RX ORDER — LANOLIN ALCOHOL/MO/W.PET/CERES
6 CREAM (GRAM) TOPICAL NIGHTLY PRN
Status: DISCONTINUED | OUTPATIENT
Start: 2020-06-29 | End: 2020-07-02 | Stop reason: HOSPADM

## 2020-06-29 RX ORDER — SODIUM CHLORIDE 0.9 % (FLUSH) 0.9 %
10 SYRINGE (ML) INJECTION 2 TIMES DAILY
Status: DISCONTINUED | OUTPATIENT
Start: 2020-06-29 | End: 2020-07-02 | Stop reason: HOSPADM

## 2020-06-29 RX ORDER — METHYLPREDNISOLONE SODIUM SUCCINATE 40 MG/ML
40 INJECTION, POWDER, LYOPHILIZED, FOR SOLUTION INTRAMUSCULAR; INTRAVENOUS EVERY 6 HOURS
Status: DISCONTINUED | OUTPATIENT
Start: 2020-06-29 | End: 2020-06-30

## 2020-06-29 RX ORDER — IPRATROPIUM BROMIDE AND ALBUTEROL SULFATE 2.5; .5 MG/3ML; MG/3ML
1 SOLUTION RESPIRATORY (INHALATION)
Status: DISCONTINUED | OUTPATIENT
Start: 2020-06-29 | End: 2020-07-01

## 2020-06-29 RX ADMIN — SODIUM CHLORIDE, PRESERVATIVE FREE 10 ML: 5 INJECTION INTRAVENOUS at 20:48

## 2020-06-29 RX ADMIN — SODIUM CHLORIDE, PRESERVATIVE FREE 10 ML: 5 INJECTION INTRAVENOUS at 23:40

## 2020-06-29 RX ADMIN — ACETAMINOPHEN 650 MG: 325 TABLET ORAL at 20:45

## 2020-06-29 RX ADMIN — METHYLPREDNISOLONE SODIUM SUCCINATE 40 MG: 40 INJECTION, POWDER, FOR SOLUTION INTRAMUSCULAR; INTRAVENOUS at 11:01

## 2020-06-29 RX ADMIN — SODIUM CHLORIDE, PRESERVATIVE FREE 10 ML: 5 INJECTION INTRAVENOUS at 11:02

## 2020-06-29 RX ADMIN — MORPHINE SULFATE 2 MG: 4 INJECTION, SOLUTION INTRAMUSCULAR; INTRAVENOUS at 11:03

## 2020-06-29 RX ADMIN — SODIUM CHLORIDE, PRESERVATIVE FREE 10 ML: 5 INJECTION INTRAVENOUS at 00:05

## 2020-06-29 RX ADMIN — METOPROLOL TARTRATE 25 MG: 50 TABLET, FILM COATED ORAL at 20:45

## 2020-06-29 RX ADMIN — MELATONIN 6 MG: at 23:40

## 2020-06-29 RX ADMIN — SODIUM CHLORIDE, PRESERVATIVE FREE 10 ML: 5 INJECTION INTRAVENOUS at 02:06

## 2020-06-29 RX ADMIN — MORPHINE SULFATE 2 MG: 4 INJECTION, SOLUTION INTRAMUSCULAR; INTRAVENOUS at 06:32

## 2020-06-29 RX ADMIN — ATORVASTATIN CALCIUM 80 MG: 40 TABLET, FILM COATED ORAL at 20:46

## 2020-06-29 RX ADMIN — IPRATROPIUM BROMIDE AND ALBUTEROL SULFATE 1 AMPULE: .5; 3 SOLUTION RESPIRATORY (INHALATION) at 11:56

## 2020-06-29 RX ADMIN — PRASUGREL 10 MG: 10 TABLET, FILM COATED ORAL at 11:46

## 2020-06-29 RX ADMIN — IPRATROPIUM BROMIDE AND ALBUTEROL SULFATE 1 AMPULE: .5; 3 SOLUTION RESPIRATORY (INHALATION) at 15:45

## 2020-06-29 RX ADMIN — METHYLPREDNISOLONE SODIUM SUCCINATE 40 MG: 40 INJECTION, POWDER, FOR SOLUTION INTRAMUSCULAR; INTRAVENOUS at 23:40

## 2020-06-29 RX ADMIN — METOPROLOL TARTRATE 25 MG: 50 TABLET, FILM COATED ORAL at 11:46

## 2020-06-29 RX ADMIN — IPRATROPIUM BROMIDE AND ALBUTEROL SULFATE 1 AMPULE: .5; 3 SOLUTION RESPIRATORY (INHALATION) at 20:58

## 2020-06-29 RX ADMIN — ASPIRIN 81 MG: 81 TABLET, COATED ORAL at 11:47

## 2020-06-29 RX ADMIN — METHYLPREDNISOLONE SODIUM SUCCINATE 40 MG: 40 INJECTION, POWDER, FOR SOLUTION INTRAMUSCULAR; INTRAVENOUS at 18:26

## 2020-06-29 RX ADMIN — IOPAMIDOL 100 ML: 755 INJECTION, SOLUTION INTRAVENOUS at 00:44

## 2020-06-29 RX ADMIN — ENOXAPARIN SODIUM 40 MG: 40 INJECTION SUBCUTANEOUS at 11:47

## 2020-06-29 RX ADMIN — SODIUM CHLORIDE, PRESERVATIVE FREE 10 ML: 5 INJECTION INTRAVENOUS at 11:01

## 2020-06-29 ASSESSMENT — PAIN DESCRIPTION - ORIENTATION
ORIENTATION: LEFT
ORIENTATION: RIGHT;LEFT

## 2020-06-29 ASSESSMENT — PAIN DESCRIPTION - DESCRIPTORS
DESCRIPTORS: SHOOTING;THROBBING
DESCRIPTORS: CRAMPING

## 2020-06-29 ASSESSMENT — PAIN SCALES - GENERAL
PAINLEVEL_OUTOF10: 10
PAINLEVEL_OUTOF10: 10
PAINLEVEL_OUTOF10: 7
PAINLEVEL_OUTOF10: 6

## 2020-06-29 ASSESSMENT — PAIN DESCRIPTION - PAIN TYPE
TYPE: ACUTE PAIN
TYPE: ACUTE PAIN

## 2020-06-29 ASSESSMENT — PAIN DESCRIPTION - ONSET
ONSET: ON-GOING
ONSET: ON-GOING

## 2020-06-29 ASSESSMENT — PAIN DESCRIPTION - LOCATION
LOCATION: HEAD
LOCATION: LEG

## 2020-06-29 ASSESSMENT — PAIN DESCRIPTION - PROGRESSION
CLINICAL_PROGRESSION: RAPIDLY WORSENING
CLINICAL_PROGRESSION: RAPIDLY WORSENING

## 2020-06-29 ASSESSMENT — PAIN DESCRIPTION - FREQUENCY
FREQUENCY: CONTINUOUS
FREQUENCY: CONTINUOUS

## 2020-06-29 NOTE — PROGRESS NOTES
Chest pain    Patient reports that her chest pain started yesterday  It was located in the center of her chest  It did not radiate  She rated the pain a 7 out of 10 and states that it felt like severe pressure  She states the pressure was similar to when she had her STEMI last year, however not as painful  She does state that she has been experiencing bilateral lower leg swelling. With the left leg swelling more than the right. The swelling would resolve when she propped up her legs.   Troponins x3 have been negative  BNP stable 75.84  EKG suggestive of depressed lateral leads  CT of chest negative for PE    Keep patient n.p.o. for now  Dr. Sarah Lopez will follow

## 2020-06-29 NOTE — PLAN OF CARE
Problem: Pain:  Goal: Pain level will decrease  Description: Pain level will decrease  Outcome: Ongoing  Goal: Control of acute pain  Description: Control of acute pain  Outcome: Ongoing  Goal: Control of chronic pain  Description: Control of chronic pain  Outcome: Ongoing     Problem: Falls - Risk of:  Goal: Will remain free from falls  Description: Will remain free from falls  Outcome: Ongoing  Goal: Absence of physical injury  Description: Absence of physical injury  Outcome: Ongoing     Problem: Discharge Planning:  Goal: Discharged to appropriate level of care  Description: Discharged to appropriate level of care  Outcome: Ongoing     Problem: Fluid Volume - Imbalance:  Goal: Will show no signs and symptoms of excessive bleeding  Description: Will show no signs and symptoms of excessive bleeding  Outcome: Ongoing  Goal: Absence of imbalanced fluid volume signs and symptoms  Description: Absence of imbalanced fluid volume signs and symptoms  Outcome: Ongoing     Problem: Anxiety:  Goal: Level of anxiety will decrease  Description: Level of anxiety will decrease  Outcome: Ongoing     Problem: Cardiac Output - Decreased:  Goal: Cardiac output within specified parameters  Description: Cardiac output within specified parameters  Outcome: Ongoing     Problem: Serum Glucose Level - Abnormal:  Goal: Ability to maintain appropriate glucose levels will improve  Description: Ability to maintain appropriate glucose levels will improve  Outcome: Ongoing     Problem: Tissue Perfusion - Cardiopulmonary, Altered:  Goal: Circulatory function within specified parameters  Description: Circulatory function within specified parameters  Outcome: Ongoing  Goal: Absence of angina  Description: Absence of angina  Outcome: Ongoing  Goal: Hemodynamic stability will improve  Description: Hemodynamic stability will improve  Outcome: Ongoing     Problem: Pain:  Goal: Pain level will decrease  Description: Pain level will decrease  Outcome: Ongoing  Goal: Control of acute pain  Description: Control of acute pain  Outcome: Ongoing  Goal: Control of chronic pain  Description: Control of chronic pain  Outcome: Ongoing     Problem: Falls - Risk of:  Goal: Will remain free from falls  Description: Will remain free from falls  Outcome: Ongoing  Goal: Absence of physical injury  Description: Absence of physical injury  Outcome: Ongoing     Problem: Discharge Planning:  Goal: Discharged to appropriate level of care  Description: Discharged to appropriate level of care  Outcome: Ongoing     Problem: Fluid Volume - Imbalance:  Goal: Will show no signs and symptoms of excessive bleeding  Description: Will show no signs and symptoms of excessive bleeding  Outcome: Ongoing  Goal: Absence of imbalanced fluid volume signs and symptoms  Description: Absence of imbalanced fluid volume signs and symptoms  Outcome: Ongoing     Problem: Anxiety:  Goal: Level of anxiety will decrease  Description: Level of anxiety will decrease  Outcome: Ongoing     Problem: Cardiac Output - Decreased:  Goal: Cardiac output within specified parameters  Description: Cardiac output within specified parameters  Outcome: Ongoing     Problem: Serum Glucose Level - Abnormal:  Goal: Ability to maintain appropriate glucose levels will improve  Description: Ability to maintain appropriate glucose levels will improve  Outcome: Ongoing     Problem: Tissue Perfusion - Cardiopulmonary, Altered:  Goal: Circulatory function within specified parameters  Description: Circulatory function within specified parameters  Outcome: Ongoing  Goal: Absence of angina  Description: Absence of angina  Outcome: Ongoing  Goal: Hemodynamic stability will improve  Description: Hemodynamic stability will improve  Outcome: Ongoing

## 2020-06-29 NOTE — ED NOTES
Tried to call report waiting on call back-Albert rn called back immediatly and I was able to give report     Araceli Plasencia, TAMIR  06/28/20 [address] TAMIR Culp  06/28/20 2850

## 2020-06-29 NOTE — H&P
History and Physical  Internal Medicine Hospitalist      Name:  Severo Guillaume /Age/Sex: 1970  (53 y.o. female)   MRN & CSN:  [id number] & [id number] Admission Date/Time: 2020  5:43 PM   Location:  [facility] PCP: Angel Luis Zabala MD       Hospital Day: 1          Chief Complaint: Chest Pain     Assessment and Plan:     - Central chest pain ? ACS vs PE   Hx of CAD with LAD STEMI 2019 s/p PCI. Has had left leg swelling for some days now. Leg ultrasound was negative for DVT but it could have embolized and caused a PE so will need to be ruled out with CT chest.  Chest x-ray unremarkable  Initial troponin negative. ECG negative for acute ischemic change. S/p  mg in ED. Plan  Admit to [place] [id number] for observation  CT chest PE protocol stat   Trend troponin  Cardiology consultation  N.p.o. after midnight for possible stress test tomorrow. Pain control. Continue ASA 81, Effient and Statin. Current diagnosis and plan of management discussed with the patient at the time of admission in lay language who agree to the above plan and disposition of admission for further care. All concerns and questions addressed. Diet  cardiac diet   DVT Prophylaxis [x] Lovenox, []  Heparin, [] SCDs, [] Ambulation  [] Long term AC   GI Prophylaxis [] PPI,  [] H2 Blocker,  [] Carafate,  [] Diet,  [] No GI prophylaxis, N/A: patient is not under significant medical stress, non-ICU or is receiving a diet/tube feeds   Code Status  full code   Disposition  Home when medically ready for discharge. MDM [] Low, [x] Moderate,[]  High     History of Present Illness:     Principal Problem: Chest pain x1 day. Severo Guillaume is a 53 y.o. female who presents to the ED with above complaint. Patient has PMH of LAD STEMI 2019 s/p PCI.     Complains of sudden onset central chest pressure started about 3 PM today while sitting out in her porch, 7/10 in intensity, nonradiating, felt like someone sitting on her chest, associated with difficulty taking deep breaths. She became concerned and came to the ED. Initial troponin negative. ECG negative for acute ischemic changes. She received aspirin 324 mg and sublingual nitrate in the ED. At the time of my assessment she reports that the pain has eased up a bit but is still there. She complains of left leg swelling that is been ongoing for some days now. Leg ultrasound was negative for DVT bilaterally. ED Course: Discussed case with ED physician prior to admission. ROS: As per HPI, otherwise 10-systems reviewed negative, unless as noted above. Objective:   No intake or output data in the 24 hours ending 06/28/20 2108     Vitals:   Vitals:    06/28/20 1727 06/28/20 1753 06/28/20 2050   BP:  116/74 108/64   Pulse: 103 87 82   Resp: 18 19 16   Temp: 97.3 °F (36.3 °C)  97.3 °F (36.3 °C)   TempSrc:   Oral   SpO2: 98% 99% 99%   Weight: 269 lb (122 kg)     Height: 5' 9\" (1.753 m)       Physical Exam: 06/28/20    GEN  -Awake, alert, female, Appears given age. EYES -Pupils are equally round. No vision changes. No scleral erythema, discharge, or conjunctivitis. HENT -MM are moist. Oral pharynx without exudates, no evidence of thrush. NECK -Supple, no apparent thyromegaly or masses. RESP -breathing comfortably on room air. Chest clear to auscultation bilaterally. C/V  -S1/S2 auscultated. RRR without appreciable M/R/G. No JVD or carotid bruits. No reproducible chest wall tenderness. GI  -Abdomen is soft non-distended, no significant tenderness. No masses or guarding. + BS in all quadrants. Rectal exam deferred. MS  -B/L extremities strong muscles strength. Full movements. No gross joint deformities. Mild swelling of left leg, tender. SKIN  -Normal coloration, warm, dry. No open wounds or ulcers. NEURO  -normal speech, no lateralizing weakness. PSYC  -Awake, alert, oriented x 3.       Past Medical History:      Past Medical History:   Diagnosis Date    CAD (coronary artery disease)     MRSA infection      Past Surgery History:  Patient  has a past surgical history that includes Cholecystectomy (1992); skin biopsy; and Coronary angioplasty with stent. Social History:    FAM HX: Assessed: family history includes Arthritis in her sister; Asthma in her sister; Coronary Art Dis in her father; Diabetes in her father; Heart Disease in her father; High Blood Pressure in her father; High Cholesterol in her father. Soc HX:   Social History     Socioeconomic History    Marital status: Legally      Spouse name: Melani Chaes    Number of children: 1    Years of education: 15    Highest education level: None   Occupational History    None   Social Needs    Financial resource strain: None    Food insecurity     Worry: None     Inability: None    Transportation needs     Medical: None     Non-medical: None   Tobacco Use    Smoking status: Current Every Day Smoker     Packs/day: 0.25     Types: Cigarettes    Smokeless tobacco: Never Used    Tobacco comment: 3 cigarettes a day    Substance and Sexual Activity    Alcohol use: Yes     Comment: occasional    Drug use: Yes     Frequency: 2.0 times per week     Types: Marijuana     Comment: none since heart attack     Sexual activity: Not Currently   Lifestyle    Physical activity     Days per week: None     Minutes per session: None    Stress: None   Relationships    Social connections     Talks on phone: None     Gets together: None     Attends Mandaen service: None     Active member of club or organization: None     Attends meetings of clubs or organizations: None     Relationship status: None    Intimate partner violence     Fear of current or ex partner: None     Emotionally abused: None     Physically abused: None     Forced sexual activity: None   Other Topics Concern    None   Social History Narrative    None     TOBACCO:   reports that she has been smoking cigarettes.  She has been smoking about 0.25 packs Right hilar prominence is also stable. No overt pulmonary edema. No pneumothorax or pleural effusion. No definite acute focal process. Stable examination with cardiomegaly. No evidence of pulmonary edema. Vl Dup Lower Extremity Venous Bilateral    Result Date: 6/28/2020  EXAMINATION: DUPLEX VENOUS ULTRASOUND OF THE BILATERAL LOWER EXTREMITIES, 6/28/2020 7:18 pm TECHNIQUE: Duplex ultrasound and Doppler images were obtained of the bilateral lower extremities COMPARISON: None. HISTORY: ORDERING SYSTEM PROVIDED HISTORY: swelling, pain TECHNOLOGIST PROVIDED HISTORY: Reason for exam:->swelling, pain Reason for Exam: Swelling BLE Acuity: Acute Type of Exam: Initial FINDINGS: The visualized veins of the bilateral lower extremities are patent and free of echogenic thrombus. The veins are normally compressible and have normal phasic flow. No evidence of DVT in either lower extremity.      EKG this visit:   EKG: Normal sinus rhythm, incomplete RBBB    Current Treatment Team:  Treatment Team: Physician Assistant: Cassidy Tenorio PA-C; Registered Nurse: Tara Bazzi RN; Consulting Physician: MD Ericka Ch, [address]  [facility]  6/28/2020 9:08 PM      Electronically signed by Ericka Figueroa MD on 6/28/2020 at 9:08 PM

## 2020-06-29 NOTE — PROGRESS NOTES
[facility]    Hospitalist Progress Note      Name:  Kadie Crowe /Age/Sex: 1970  (53 y.o. female)   MRN & CSN:  [id number] & [id number] Admission Date/Time: 2020  5:43 PM   Location:  [address] [place] [address] PCP: Desiree Betancur MD         Hospital Day: 2    Assessment and Plan:   Kadie Crowe is a 53 y.o.  female  who presents with SOB and chest pain     Acute COPD exacerbation     No previous DX - chronic smoker - presenting with SOB and Chest pain with wheezes  Started on Duoneb, Solumedrol IV  PFT as an OP once acute issue resolved    Chest pain - Hx of CAD    likely from COPD, Hx of Stemi with PCI in 2019   Evaluated by Cardiology - stress test once COPD is controled   Continue with ASA. Effient, Lipitor, Lopressor and     Hyperlipidemia - on Lipitor     Tobacco use disorder     Counseling and Nicoderm patch     Diet Diet NPO, After Midnight   DVT Prophylaxis [] Lovenox, []  Heparin, [] SCDs, []No VTE prophylaxis, patient ambulating   GI Prophylaxis [] PPI, [] H2 Blocker, [] No GI prophylaxis, patient is receiving diet/Tube Feeds   Code Status Full Code   Disposition Patient requires continued admission due to Chest pain, SOB   MDM [] Low, [] Moderate,[]  High     History of Present Illness: Subjective     Patient Seen & Examined at the bedside        Ten point ROS reviewed negative, unless as noted above    Objective: Intake/Output Summary (Last 24 hours) at 2020 1032  Last data filed at 2020 0005  Gross per 24 hour   Intake 10 ml   Output --   Net 10 ml      Vitals:   Vitals:    20 0900   BP: 131/65   Pulse: 73   Resp: 18   Temp: 97.9 °F (36.6 °C)   SpO2: 96%     Physical Exam:    GEN Awake female, resting in bed in no apparent distress. Appears given age. HENT Mucous membranes are moist.   RESP diffuse wheezes, no rales or rhonchi. CARDIO/VASC -S1/S2 auscultated. Regular rate without appreciable murmurs, rubs, or gallops. Peripheral pulses equal bilaterally and palpable.  No peripheral edema. GI Abdomen is soft without significant tenderness, masses, or guarding. Bowel sounds are normoactive. Rectal exam deferred.  Rogers catheter is not present.     Medications:   Medications:    sodium chloride flush  10 mL Intravenous BID    aspirin EC  81 mg Oral Daily    atorvastatin  80 mg Oral Nightly    metoprolol tartrate  25 mg Oral BID    nicotine  1 patch Transdermal Q24H    prasugrel  10 mg Oral Daily    sodium chloride flush  10 mL Intravenous 2 times per day    enoxaparin  40 mg Subcutaneous Daily      Infusions:   PRN Meds: sodium chloride flush, 10 mL, PRN  acetaminophen, 650 mg, Q6H PRN    Or  acetaminophen, 650 mg, Q6H PRN  polyethylene glycol, 17 g, Daily PRN  promethazine, 12.5 mg, Q6H PRN    Or  ondansetron, 4 mg, Q6H PRN  morphine, 2 mg, Q4H PRN          Electronically signed by Yolette Sylvester MD on 6/29/2020 at 10:32 AM

## 2020-06-29 NOTE — CONSULTS
CARDIOLOGY CONSULT NOTE    Reason for consultation: Chest pain     Referring physician: Dary Roper MD      Primary care physician: Amanda Lopez MD        Dear Dary Roper MD   Thanks for the consult.     History of present illness:Joao is a 53 y. o.year old who  presents with  chest pain for last few days, happening daily, intermittent for 15 to 20 mins and aggravated with activity substernal also,reproducible with palpation, radiated to shoulder, 6/10, tender to touch,associated with shortness of breath, + sweating, nausea, did not get NTG in ED. No fever, no chills, no nausea no vomiting. Blood pressure, cholesterol, blood glucose and weight are well controlled.     Chief Complaint   Patient presents with    Chest Pain       Past medical history:    has a past medical history of CAD (coronary artery disease) and MRSA infection. Past surgical history:   has a past surgical history that includes Cholecystectomy (1992); skin biopsy; and Coronary angioplasty with stent. Social History:   reports that she has been smoking cigarettes. She has been smoking about 0.25 packs per day. She has never used smokeless tobacco. She reports current alcohol use. She reports current drug use. Frequency: 2.00 times per week. Drug: Marijuana.   Family history:   no family history of CAD, STROKE of DM    sodium chloride flush 0.9 % injection 10 mL, BID  ipratropium-albuterol (DUONEB) nebulizer solution 1 ampule, Q4H WA  methylPREDNISolone sodium (SOLU-MEDROL) injection 40 mg, Q6H  aspirin EC tablet 81 mg, Daily  atorvastatin (LIPITOR) tablet 80 mg, Nightly  metoprolol tartrate (LOPRESSOR) tablet 25 mg, BID  nicotine (NICODERM CQ) 7 MG/24HR 1 patch, Q24H  prasugrel (EFFIENT) tablet 10 mg, Daily  sodium chloride flush 0.9 % injection 10 mL, 2 times per day  sodium chloride flush 0.9 % injection 10 mL, PRN  acetaminophen (TYLENOL) tablet 650 mg, Q6H PRN    Or  acetaminophen (TYLENOL) suppository 650 mg, Q6H PRN  polyethylene glycol (GLYCOLAX) packet 17 g, Daily PRN  promethazine (PHENERGAN) tablet 12.5 mg, Q6H PRN    Or  ondansetron (ZOFRAN) injection 4 mg, Q6H PRN  enoxaparin (LOVENOX) injection 40 mg, Daily  morphine sulfate (PF) injection 2 mg, Q4H PRN      Current Facility-Administered Medications   Medication Dose Route Frequency Provider Last Rate Last Dose    sodium chloride flush 0.9 % injection 10 mL  10 mL Intravenous BID Sanchez Becker MD   10 mL at 06/29/20 0206    ipratropium-albuterol (DUONEB) nebulizer solution 1 ampule  1 ampule Inhalation Q4H WA Felipe Lau MD        methylPREDNISolone sodium (SOLU-MEDROL) injection 40 mg  40 mg Intravenous Q6H Felipe Lau MD        aspirin EC tablet 81 mg  81 mg Oral Daily Sanchez Becker MD        atorvastatin (LIPITOR) tablet 80 mg  80 mg Oral Nightly Sanchez Becker MD        metoprolol tartrate (LOPRESSOR) tablet 25 mg  25 mg Oral BID Sanchez Becker MD        nicotine (NICODERM CQ) 7 MG/24HR 1 patch  1 patch Transdermal Q24H Sanchez Becker MD        prasugrel (EFFIENT) tablet 10 mg  10 mg Oral Daily Sanchez Becker MD        sodium chloride flush 0.9 % injection 10 mL  10 mL Intravenous 2 times per day Sanchez Becker MD   10 mL at 06/29/20 0005    sodium chloride flush 0.9 % injection 10 mL  10 mL Intravenous PRN Sanchez Becker MD        acetaminophen (TYLENOL) tablet 650 mg  650 mg Oral Q6H PRN Sanchez Becker MD        Or    acetaminophen (TYLENOL) suppository 650 mg  650 mg Rectal Q6H PRN Sanchez Becker MD        polyethylene glycol (GLYCOLAX) packet 17 g  17 g Oral Daily PRN Sanchez Becker MD        promethazine (PHENERGAN) tablet 12.5 mg  12.5 mg Oral Q6H PRN Sanchez Becker MD        Or    ondansetron (ZOFRAN) injection 4 mg  4 mg Intravenous Q6H PRN Sanchez Becker MD        enoxaparin (LOVENOX) injection 40 mg  40 mg Subcutaneous Daily Sanchez Becker MD        morphine sulfate (PF) injection 2 mg 2 mg Intravenous Q4H PRN Janna Del Castillo MD   2 mg at 06/29/20 8193          Review of Systems:    · Constitutional: No Fever or Weight Loss    · Eyes: No Decreased Vision  · ENT: No Headaches, Hearing Loss or Vertigo  · Cardiovascular: + chest pain, dyspnea on exertion, palpitations or loss of consciousness  · Respiratory: No cough or wheezing    · Gastrointestinal: No abdominal pain, appetite loss, blood in stools, constipation, diarrhea or heartburn  · Genitourinary: No dysuria, trouble voiding, or hematuria  · Musculoskeletal: No gait disturbance, weakness or joint complaints  · Integumentary: No rash or pruritis  · Neurological: No TIA or stroke symptoms  · Psychiatric: No anxiety or depression  · Endocrine: No malaise, fatigue or temperature intolerance  · Hematologic/Lymphatic: No bleeding problems, blood clots or swollen lymph nodes  · Allergic/Immunologic: No nasal congestion or hives  All systems negative except as marked.      ·    ·    ·      Physical Examination:    Vitals:    06/29/20 0900   BP: 131/65   Pulse: 73   Resp: 18   Temp: 97.9 °F (36.6 °C)   SpO2: 96%      Wt Readings from Last 3 Encounters:   06/28/20 269 lb (122 kg)   02/13/20 268 lb 9.6 oz (121.8 kg)   06/26/19 256 lb 12.8 oz (116.5 kg)     Body mass index is 39.72 kg/m².        General Appearance: No distress, conversant     Constitutional: Well developed, Well nourished, No acute distress, Non-toxic appearance.    HENT:  Normocephalic, Atraumatic, Bilateral external ears normal, Oropharynx moist, No oral exudates, Nose normal. Neck- Normal range of motion, No tenderness, Supple, No stridor,no apical-carotid delay, no carotid bruit  Eyes: PERRL, EOMI, Conjunctiva normal, No discharge.    Respiratory:  Normal breath sounds, No respiratory distress, + wheezing, No chest tenderness. ,no use of accessory muscles, diaphragm movement is normal  Cardiovascular: (PMI) apex non displaced,no lifts no thrills, no s3,no s4, Normal heart rate, Normal rhythm, No murmurs, No rubs, No gallops. Carotid arteries pulse and amplitude are normal no bruit, no abdominal bruit noted ( normal abdominal aorta ausculation), femoral arteries pulse and amplitude are normal no bruit, pedal pulses are normal.  GI: Bowel sounds normal, Soft, No tenderness, No masses, No pulsatile masses, no hepatosplenomegally, no bruits  : External genitalia appear normal, No masses or lesions. No discharge. No CVA tenderness.    Musculoskeletal: Intact distal pulses, No edema, No tenderness, No cyanosis, No clubbing. Good range of motion in all major joints. No tenderness to palpation or major deformities noted. Back- No tenderness. Integument: Warm, Dry, No erythema, No rash. Skin: no rash, no ulcers  Lymphatic: No lymphadenopathy noted.    Neurologic: Alert & oriented x 3, Normal motor function, Normal sensory function, No focal deficits noted.    Psychiatric: Affect normal, Judgment normal, Mood normal.   Lab Review        Recent Labs       09/28/16  0010    WBC  12.3*    HGB  14.4    HCT  43.8    PLT  316            Recent Labs       09/28/16  0010    NA  136    K  3.9    CL  95*    CO2  23    BUN  10    CREATININE  0.8           Recent Labs       09/28/16  0010    AST  12*    ALT  10    BILITOT  0.4    ALKPHOS  124       No results for input(s): TROPONINI in the last 72 hours. No results found for: BNP  No results found for: INR, PROTIME        EKG:nsr     Chest Xray:nad     ECHO:pending  Labs, echo, meds reviewed  Assessment:      Recommendations:     1. Chest pain: most LIKELY FROM acitve wheezing and COPD exacerbation,w ill recommend to treat COPD and once wheezing is better, will do stress test  2. Acute exacerbation of COPD: IV solumedrol and nebulizers also  3. CAD: patient did not follow up after [facility],not sure if she is compliant or not. continue aspirin, effient statins  4.  Health maintenance: exerise and diet  All labs, medications and tests reviewed, continue all other medications of all above medical condition listed as is.          Bandar Moody MD,   Bandar Moody MD, 6/29/2020 10:43 AM

## 2020-06-29 NOTE — ACP (ADVANCE CARE PLANNING)
Advance Care Planning     Advance Care Planning Activator (Inpatient)  Conversation Note      Date of ACP Conversation: 6/28/2020    Conversation Conducted with: Patient with Decision Making Capacity    ACP Activator: Lea Longo    *When Decision Maker makes decisions on behalf of the incapacitated patient: Decision Maker is asked to consider and make decisions based on patient values, known preferences, or best interests. Health Care Decision Maker:     Current Designated Health Care Decision Maker:   Primary Decision Maker: Anton Franco - Spouse - [phone]    Secondary Decision Maker: Joyce Portillo - Child - [phone]  (If there is a [address] named in the [address] [employer]\" box in the ACP activity, but it is not visible above, be sure to open that field and then select the health care decision maker relationship (ie \"primary\") in the blank space to the right of the name.) Validate  this information as still accurate & up-to-date; edit [place] field as needed.)    Note: Assess and validate information in current ACP documents, as indicated. If no Decision Maker listed above or available through scanned documents, then:    If no Authorized Decision Maker has previously been identified, then patient chooses [place]:  \"Who would you like to name as your primary health care decision-maker? \"               Name: Hunter Stearnsoks    Relationship: spouse ()    Phone number: [phone]  Venu Cabral this person be reached easily? \" Yes  \"Who would you like to name as your back-up decision maker? \"   Name: Emily Huerta   Relationship: son     Phone number: [phone]  Venu Cabral this person be reached easily? \" Yes    Note: If the relationship of these Decision-Makers to the patient does NOT follow your state's Next of Kin hierarchy, recommend that patient complete ACP document that meets state-specific requirements to allow them to act on the patient's behalf when appropriate. Care Preferences    Ventilation: \"If you were in your present state of health and suddenly became very ill and were unable to breathe on your own, what would your preference be about the use of a ventilator (breathing machine) if it were available to you? \"      Would the patient desire the use of ventilator (breathing machine)?: yes    \"If your health worsens and it becomes clear that your chance of recovery is unlikely, what would your preference be about the use of a ventilator (breathing machine) if it were available to you? \"     Would the patient desire the use of ventilator (breathing machine)?: No      Resuscitation  \"CPR works best to restart the heart when there is a sudden event, like a heart attack, in someone who is otherwise healthy. Unfortunately, CPR does not typically restart the heart for people who have serious health conditions or who are very sick. \"    \"In the event your heart stopped as a result of an underlying serious health condition, would you want attempts to be made to restart your heart (answer \"yes\" for attempt to resuscitate) or would you prefer a natural death (answer \"no\" for do not attempt to resuscitate)? \" yes      NOTE: If the patient has a valid advance directive AND now provides care preference(s) that are inconsistent with that prior directive, advise the patient to consider either: creating a new advance directive that complies with state-specific requirements; or, if that is not possible, orally revoking that prior directive in accordance with state-specific requirements, which must be documented in the EHR. [x] Yes   [] No   Educated Patient / Jm Lepe regarding differences between Advance Directives and portable DNR orders.     Length of ACP Conversation in minutes:  15  Conversation Outcomes:  [x] ACP discussion completed  [] Existing advance directive reviewed with patient; no changes to patient's previously recorded wishes  [] New Advance Directive completed  [] Portable Do Not Rescitate prepared for Provider review and signature  [] POLST/POST/MOLST/MOST prepared for Provider review and signature      Follow-up plan:    [] Schedule follow-up conversation to continue planning  [x] Referred individual to Provider for additional questions/concerns   [x] Advised patient/agent/surrogate to review completed ACP document and update if needed with changes in condition, patient preferences or care setting    [x] This note routed to one or more involved healthcare providers     CM did talk to patient about a POA and LW and explained either of these could be completed here in hospital at this time with no cost. Patient did not prefer to have either of these completed at this time. However, CM did provide patient with copies of both for review and explained CM can assist with completion in future if needed; patient just needs to come to ED 11a- 11p. Patient names spouse Karen Markham as primary decision maker. Patient states she and Hansa Pablo are , but he knows her wishes.

## 2020-06-29 NOTE — PROGRESS NOTES
Two person skin check performed with Erich Arriola LPN. No issues with skin integrity noted. Pt A&O x4. Able to ambulate independently in the room. NPO at 0000 for stress test in AM. Pt denying any needs at this time. Will continue to monitor.       Electronically signed by Vladimir Ochoa RN on 6/29/20 at 12:04 AM EDT

## 2020-06-29 NOTE — CARE COORDINATION
CM met with patient to begin discharge planning and ACP discussion. CM introduced self and CM role. Patient states she presents to ER with c/o chest pain and leg swelling for the past few days. Patient states she has a history of stent placement in June 2019. Patient states she lives in a 2 story home in [place] with her son and daughter in law. Patient states her parents live in the lower level of the home and she has been staying with her parents due to the swelling to her legs. Patient has a PCP and insurance which assists with medication affordability. Patient uses the following DME: [employer]. Patient drives and is able to get to and from doctor's appointments. Patient denies [facility]. Patient independnet with ADLs. Patient plan return home with family upon discharge. CM did talk to patient about a POA and LW and explained either of these could be completed here in hospital at this time with no cost. Patient did not prefer to have either of these completed at this time. However, CM did provide patient with copies of both for review and explained CM can assist with completion in future if needed; patient just needs to come to ED 11a- 11p. Patient names spouse Vivi Monzon as primary decision maker. Patient states she and Josselin Leaks are , but he knows her wishes.

## 2020-06-30 LAB
ANION GAP SERPL CALCULATED.3IONS-SCNC: 8 MMOL/L (ref 4–16)
BUN BLDV-MCNC: 14 MG/DL (ref 6–23)
CALCIUM SERPL-MCNC: 9.7 MG/DL (ref 8.3–10.6)
CHLORIDE BLD-SCNC: 106 MMOL/L (ref 99–110)
CHOLESTEROL: 158 MG/DL
CO2: 25 MMOL/L (ref 21–32)
CREAT SERPL-MCNC: 0.8 MG/DL (ref 0.6–1.1)
GFR AFRICAN AMERICAN: >60 ML/MIN/1.73M2
GFR NON-AFRICAN AMERICAN: >60 ML/MIN/1.73M2
GLUCOSE BLD-MCNC: 154 MG/DL (ref 70–99)
HDLC SERPL-MCNC: 53 MG/DL
LDL CHOLESTEROL DIRECT: 100 MG/DL
MAGNESIUM: 2.2 MG/DL (ref 1.8–2.4)
POTASSIUM SERPL-SCNC: 4.6 MMOL/L (ref 3.5–5.1)
SODIUM BLD-SCNC: 139 MMOL/L (ref 135–145)
TRIGL SERPL-MCNC: 55 MG/DL

## 2020-06-30 PROCEDURE — 6370000000 HC RX 637 (ALT 250 FOR IP): Performed by: PHYSICIAN ASSISTANT

## 2020-06-30 PROCEDURE — 6370000000 HC RX 637 (ALT 250 FOR IP): Performed by: INTERNAL MEDICINE

## 2020-06-30 PROCEDURE — 2580000003 HC RX 258: Performed by: INTERNAL MEDICINE

## 2020-06-30 PROCEDURE — 6360000002 HC RX W HCPCS: Performed by: INTERNAL MEDICINE

## 2020-06-30 PROCEDURE — 83721 ASSAY OF BLOOD LIPOPROTEIN: CPT

## 2020-06-30 PROCEDURE — G0378 HOSPITAL OBSERVATION PER HR: HCPCS

## 2020-06-30 PROCEDURE — 83735 ASSAY OF MAGNESIUM: CPT

## 2020-06-30 PROCEDURE — 80048 BASIC METABOLIC PNL TOTAL CA: CPT

## 2020-06-30 PROCEDURE — 99214 OFFICE O/P EST MOD 30 MIN: CPT | Performed by: INTERNAL MEDICINE

## 2020-06-30 PROCEDURE — 80061 LIPID PANEL: CPT

## 2020-06-30 PROCEDURE — 94640 AIRWAY INHALATION TREATMENT: CPT

## 2020-06-30 PROCEDURE — APPSS15 APP SPLIT SHARED TIME 0-15 MINUTES: Performed by: NURSE PRACTITIONER

## 2020-06-30 PROCEDURE — 36415 COLL VENOUS BLD VENIPUNCTURE: CPT

## 2020-06-30 PROCEDURE — 94761 N-INVAS EAR/PLS OXIMETRY MLT: CPT

## 2020-06-30 PROCEDURE — 96376 TX/PRO/DX INJ SAME DRUG ADON: CPT

## 2020-06-30 PROCEDURE — 6360000002 HC RX W HCPCS: Performed by: STUDENT IN AN ORGANIZED HEALTH CARE EDUCATION/TRAINING PROGRAM

## 2020-06-30 RX ORDER — METHYLPREDNISOLONE SODIUM SUCCINATE 40 MG/ML
40 INJECTION, POWDER, LYOPHILIZED, FOR SOLUTION INTRAMUSCULAR; INTRAVENOUS EVERY 12 HOURS
Status: DISCONTINUED | OUTPATIENT
Start: 2020-06-30 | End: 2020-07-01

## 2020-06-30 RX ADMIN — ACETAMINOPHEN 650 MG: 325 TABLET ORAL at 22:26

## 2020-06-30 RX ADMIN — METHYLPREDNISOLONE SODIUM SUCCINATE 40 MG: 40 INJECTION, POWDER, FOR SOLUTION INTRAMUSCULAR; INTRAVENOUS at 22:25

## 2020-06-30 RX ADMIN — SODIUM CHLORIDE, PRESERVATIVE FREE 10 ML: 5 INJECTION INTRAVENOUS at 10:55

## 2020-06-30 RX ADMIN — SODIUM CHLORIDE, PRESERVATIVE FREE 10 ML: 5 INJECTION INTRAVENOUS at 22:27

## 2020-06-30 RX ADMIN — IPRATROPIUM BROMIDE AND ALBUTEROL SULFATE 1 AMPULE: .5; 3 SOLUTION RESPIRATORY (INHALATION) at 20:28

## 2020-06-30 RX ADMIN — ACETAMINOPHEN 650 MG: 325 TABLET ORAL at 13:34

## 2020-06-30 RX ADMIN — ATORVASTATIN CALCIUM 80 MG: 40 TABLET, FILM COATED ORAL at 22:24

## 2020-06-30 RX ADMIN — IPRATROPIUM BROMIDE AND ALBUTEROL SULFATE 1 AMPULE: .5; 3 SOLUTION RESPIRATORY (INHALATION) at 12:10

## 2020-06-30 RX ADMIN — METOPROLOL TARTRATE 25 MG: 50 TABLET, FILM COATED ORAL at 22:25

## 2020-06-30 RX ADMIN — METHYLPREDNISOLONE SODIUM SUCCINATE 40 MG: 40 INJECTION, POWDER, FOR SOLUTION INTRAMUSCULAR; INTRAVENOUS at 05:09

## 2020-06-30 RX ADMIN — IPRATROPIUM BROMIDE AND ALBUTEROL SULFATE 1 AMPULE: .5; 3 SOLUTION RESPIRATORY (INHALATION) at 08:55

## 2020-06-30 RX ADMIN — MELATONIN 6 MG: at 22:26

## 2020-06-30 RX ADMIN — IPRATROPIUM BROMIDE AND ALBUTEROL SULFATE 1 AMPULE: .5; 3 SOLUTION RESPIRATORY (INHALATION) at 16:12

## 2020-06-30 RX ADMIN — SODIUM CHLORIDE, PRESERVATIVE FREE 10 ML: 5 INJECTION INTRAVENOUS at 05:09

## 2020-06-30 ASSESSMENT — PAIN SCALES - GENERAL
PAINLEVEL_OUTOF10: 3
PAINLEVEL_OUTOF10: 5

## 2020-06-30 ASSESSMENT — PAIN DESCRIPTION - ORIENTATION: ORIENTATION: LEFT

## 2020-06-30 ASSESSMENT — PAIN DESCRIPTION - LOCATION: LOCATION: HEAD;LEG

## 2020-06-30 ASSESSMENT — PAIN DESCRIPTION - ONSET: ONSET: ON-GOING

## 2020-06-30 ASSESSMENT — PAIN DESCRIPTION - PROGRESSION: CLINICAL_PROGRESSION: NOT CHANGED

## 2020-06-30 ASSESSMENT — PAIN DESCRIPTION - PAIN TYPE: TYPE: ACUTE PAIN

## 2020-06-30 ASSESSMENT — PAIN DESCRIPTION - FREQUENCY: FREQUENCY: CONTINUOUS

## 2020-06-30 ASSESSMENT — PAIN DESCRIPTION - DESCRIPTORS: DESCRIPTORS: CRAMPING

## 2020-06-30 NOTE — PROGRESS NOTES
age.  Palmyra Habermann are equally round. No scleral discharge  HENT Atraumatic and symmetric head  NECK No apparent thyromegaly  RESP Symmetric chest movement while on room air. CARDIO/VASC Peripheral pulses equal bilaterally and palpable. No peripheral edema. GI Abdomen is not distended. Rectal exam deferred.  Rogers catheter is not present. HEME/LYMPH No petechiae or ecchymoses. MSK Spontaneous movement of BL upper extremities  SKIN Normal coloration, warm, dry. NEURO Cranial nerves appear grossly intact  PSYCH Awake, alert.  Oriented x4    Medications:   Medications:    sodium chloride flush  10 mL Intravenous BID    ipratropium-albuterol  1 ampule Inhalation Q4H WA    methylPREDNISolone  40 mg Intravenous Q6H    aspirin EC  81 mg Oral Daily    atorvastatin  80 mg Oral Nightly    metoprolol tartrate  25 mg Oral BID    nicotine  1 patch Transdermal Q24H    prasugrel  10 mg Oral Daily    sodium chloride flush  10 mL Intravenous 2 times per day    enoxaparin  40 mg Subcutaneous Daily      Infusions:   PRN Meds: melatonin, 6 mg, Nightly PRN  sodium chloride flush, 10 mL, PRN  acetaminophen, 650 mg, Q6H PRN    Or  acetaminophen, 650 mg, Q6H PRN  polyethylene glycol, 17 g, Daily PRN  promethazine, 12.5 mg, Q6H PRN    Or  ondansetron, 4 mg, Q6H PRN  morphine, 2 mg, Q4H PRN          Electronically signed by Velasquez Mc DO on 6/30/2020 at 4:06 PM

## 2020-06-30 NOTE — PLAN OF CARE
Problem: Pain:  Goal: Pain level will decrease  Description: Pain level will decrease  Outcome: Ongoing  Goal: Control of acute pain  Description: Control of acute pain  Outcome: Ongoing  Goal: Control of chronic pain  Description: Control of chronic pain  Outcome: Ongoing     Problem: Falls - Risk of:  Goal: Will remain free from falls  Description: Will remain free from falls  Outcome: Ongoing  Goal: Absence of physical injury  Description: Absence of physical injury  Outcome: Ongoing     Problem: Discharge Planning:  Goal: Discharged to appropriate level of care  Description: Discharged to appropriate level of care  Outcome: Ongoing     Problem: Fluid Volume - Imbalance:  Goal: Will show no signs and symptoms of excessive bleeding  Description: Will show no signs and symptoms of excessive bleeding  Outcome: Ongoing  Goal: Absence of imbalanced fluid volume signs and symptoms  Description: Absence of imbalanced fluid volume signs and symptoms  Outcome: Ongoing     Problem: Anxiety:  Goal: Level of anxiety will decrease  Description: Level of anxiety will decrease  Outcome: Ongoing     Problem: Cardiac Output - Decreased:  Goal: Cardiac output within specified parameters  Description: Cardiac output within specified parameters  Outcome: Ongoing     Problem: Serum Glucose Level - Abnormal:  Goal: Ability to maintain appropriate glucose levels will improve  Description: Ability to maintain appropriate glucose levels will improve  Outcome: Ongoing     Problem: Tissue Perfusion - Cardiopulmonary, Altered:  Goal: Circulatory function within specified parameters  Description: Circulatory function within specified parameters  Outcome: Ongoing  Goal: Absence of angina  Description: Absence of angina  Outcome: Ongoing  Goal: Hemodynamic stability will improve  Description: Hemodynamic stability will improve  Outcome: Ongoing

## 2020-06-30 NOTE — PROGRESS NOTES
Cardiology Note    Today's plan: continue to [facility]. Patient still wheezing    Admit Date:  6/28/2020    Admission diagnosis / Complaint: chest pain      Subjective:  Ms. Roxy Holder is resting in bed. Denies complaints. Reports she is still wheezing. Seems to have improved. She states she does not have a productive cough      Assessment and Plan:    1. Chest pain: Most likely from active wheezing and COPD exacerbation. Once COPD has improved will consider stress test if symptoms persist    2. Acute exacerbation of COPD per primary team    3. CAD patient did not follow-up after left heart cath unsure if she continued her Effient statins and aspirin. Objective:   BP (!) 93/48   Pulse 97   Temp 98.2 °F (36.8 °C) (Oral)   Resp 20   Ht 5' 9\" (1.753 m)   Wt 271 lb (122.9 kg)   LMP 06/13/2018   SpO2 95%   BMI 40.02 kg/m²       Intake/Output Summary (Last 24 hours) at 6/30/2020 1526  Last data filed at 6/30/2020 0516  Gross per 24 hour   Intake 20 ml   Output 1400 ml   Net -1380 ml       TELEMETRY: Sinus    has a past medical history of CAD (coronary artery disease) and MRSA infection. has a past surgical history that includes Cholecystectomy (1992); skin biopsy; and Coronary angioplasty with stent.   Physical Exam:  General:  Awake, alert, NAD  Skin:  Warm and dry  Neck:  JVD not present  Chest: Bilateral upper and lower lobe rhonchi noted  Cardiovascular:  RRR S1S2  Abdomen:  Soft nontender  Extremities:  Trace edema    Medications:    sodium chloride flush  10 mL Intravenous BID    ipratropium-albuterol  1 ampule Inhalation Q4H WA    methylPREDNISolone  40 mg Intravenous Q6H    aspirin EC  81 mg Oral Daily    atorvastatin  80 mg Oral Nightly    metoprolol tartrate  25 mg Oral BID    nicotine  1 patch Transdermal Q24H    prasugrel  10 mg Oral Daily    sodium chloride flush  10 mL Intravenous 2 times per day    enoxaparin  40 mg Subcutaneous Daily       melatonin, sodium chloride flush, acetaminophen **OR** acetaminophen, polyethylene glycol, promethazine **OR** ondansetron, morphine    Lab Data:  CBC:   Recent Labs     06/28/20  1758   WBC 8.9   HGB 12.5   HCT 38.2   MCV 90.3        BMP:   Recent Labs     06/28/20  1758 06/30/20  0531    139   K 3.7 4.6    106   CO2 25 25   BUN 18 14   CREATININE 1.1 0.8     LIVER PROFILE:   Recent Labs     06/28/20  1758   AST 17   ALT 13   BILITOT 0.4   ALKPHOS 71     PT/INR: No results for input(s): PROTIME, INR in the last 72 hours. APTT: No results for input(s): APTT in the last 72 hours. BNP:  No results for input(s): BNP in the last 72 hours. TROPONIN: @TROPONINI:3@          Palmer AppleSADIE 6/30/2020 3:26 PM     I have seen ,spoken to  and examined this patient personally, independently of the nurse practitioner. I have reviewed the hospital care given to date and reviewed all pertinent labs and imaging. The plan was developed mutually at the time of the visit with the patient,  NP  and myself. I have spoken with patient, nursing staff and provided written and verbal instructions . The above note has been reviewed and I agree with the assessment, diagnosis, and treatment plan with changes made by me as follows     CARDIOLOGY ATTENDING ADDENDUM    HPI:  I have reviewed the above HPI  And agree with above   Joao Walker is a 53 y. o.year old who and presents with had concerns including Chest Pain.   Chief Complaint   Patient presents with    Chest Pain     Interval history:  Stress test is on hold due to wheezing    Physical Exam:  General:  Awake, alert, NAD  Head:normal  Eye:normal  Neck:  No JVD   Chest: + wheezing  Cardiovascular:  RRR S1S2  Abdomen:   nontender  Extremities:  no edema  Pulses; palpable  Neuro: grossly normal      MEDICAL DECISION MAKING;    I agree with the above plan, which was planned by myself and discussed with NP.

## 2020-07-01 ENCOUNTER — APPOINTMENT (OUTPATIENT)
Dept: CT IMAGING | Age: 50
End: 2020-07-01
Payer: COMMERCIAL

## 2020-07-01 ENCOUNTER — APPOINTMENT (OUTPATIENT)
Dept: NUCLEAR MEDICINE | Age: 50
End: 2020-07-01
Payer: COMMERCIAL

## 2020-07-01 LAB
LV EF: 63 %
LVEF MODALITY: NORMAL

## 2020-07-01 PROCEDURE — 2580000003 HC RX 258: Performed by: INTERNAL MEDICINE

## 2020-07-01 PROCEDURE — 73700 CT LOWER EXTREMITY W/O DYE: CPT

## 2020-07-01 PROCEDURE — 78452 HT MUSCLE IMAGE SPECT MULT: CPT

## 2020-07-01 PROCEDURE — 6370000000 HC RX 637 (ALT 250 FOR IP): Performed by: INTERNAL MEDICINE

## 2020-07-01 PROCEDURE — 93017 CV STRESS TEST TRACING ONLY: CPT

## 2020-07-01 PROCEDURE — 96376 TX/PRO/DX INJ SAME DRUG ADON: CPT

## 2020-07-01 PROCEDURE — 99215 OFFICE O/P EST HI 40 MIN: CPT | Performed by: INTERNAL MEDICINE

## 2020-07-01 PROCEDURE — 3430000000 HC RX DIAGNOSTIC RADIOPHARMACEUTICAL: Performed by: INTERNAL MEDICINE

## 2020-07-01 PROCEDURE — 6370000000 HC RX 637 (ALT 250 FOR IP): Performed by: STUDENT IN AN ORGANIZED HEALTH CARE EDUCATION/TRAINING PROGRAM

## 2020-07-01 PROCEDURE — G0378 HOSPITAL OBSERVATION PER HR: HCPCS

## 2020-07-01 PROCEDURE — 96372 THER/PROPH/DIAG INJ SC/IM: CPT

## 2020-07-01 PROCEDURE — 6370000000 HC RX 637 (ALT 250 FOR IP): Performed by: PHYSICIAN ASSISTANT

## 2020-07-01 PROCEDURE — 6360000002 HC RX W HCPCS: Performed by: STUDENT IN AN ORGANIZED HEALTH CARE EDUCATION/TRAINING PROGRAM

## 2020-07-01 PROCEDURE — 6360000002 HC RX W HCPCS: Performed by: INTERNAL MEDICINE

## 2020-07-01 PROCEDURE — 94640 AIRWAY INHALATION TREATMENT: CPT

## 2020-07-01 PROCEDURE — A9500 TC99M SESTAMIBI: HCPCS | Performed by: INTERNAL MEDICINE

## 2020-07-01 PROCEDURE — 94761 N-INVAS EAR/PLS OXIMETRY MLT: CPT

## 2020-07-01 PROCEDURE — 72131 CT LUMBAR SPINE W/O DYE: CPT

## 2020-07-01 RX ORDER — AMINOPHYLLINE DIHYDRATE 25 MG/ML
75 INJECTION, SOLUTION INTRAVENOUS ONCE
Status: COMPLETED | OUTPATIENT
Start: 2020-07-01 | End: 2020-07-01

## 2020-07-01 RX ORDER — GABAPENTIN 100 MG/1
100 CAPSULE ORAL 3 TIMES DAILY
Status: DISCONTINUED | OUTPATIENT
Start: 2020-07-01 | End: 2020-07-02 | Stop reason: HOSPADM

## 2020-07-01 RX ORDER — IPRATROPIUM BROMIDE AND ALBUTEROL SULFATE 2.5; .5 MG/3ML; MG/3ML
1 SOLUTION RESPIRATORY (INHALATION) EVERY 4 HOURS PRN
Status: DISCONTINUED | OUTPATIENT
Start: 2020-07-01 | End: 2020-07-02 | Stop reason: HOSPADM

## 2020-07-01 RX ADMIN — GABAPENTIN 100 MG: 100 CAPSULE ORAL at 21:40

## 2020-07-01 RX ADMIN — ATORVASTATIN CALCIUM 80 MG: 40 TABLET, FILM COATED ORAL at 21:40

## 2020-07-01 RX ADMIN — Medication 10 MILLICURIE: at 12:55

## 2020-07-01 RX ADMIN — IPRATROPIUM BROMIDE AND ALBUTEROL SULFATE 1 AMPULE: .5; 3 SOLUTION RESPIRATORY (INHALATION) at 07:35

## 2020-07-01 RX ADMIN — IPRATROPIUM BROMIDE AND ALBUTEROL SULFATE 1 AMPULE: .5; 3 SOLUTION RESPIRATORY (INHALATION) at 10:58

## 2020-07-01 RX ADMIN — METHYLPREDNISOLONE SODIUM SUCCINATE 40 MG: 40 INJECTION, POWDER, FOR SOLUTION INTRAMUSCULAR; INTRAVENOUS at 10:21

## 2020-07-01 RX ADMIN — SODIUM CHLORIDE, PRESERVATIVE FREE 10 ML: 5 INJECTION INTRAVENOUS at 21:41

## 2020-07-01 RX ADMIN — PRASUGREL 10 MG: 10 TABLET, FILM COATED ORAL at 10:22

## 2020-07-01 RX ADMIN — GABAPENTIN 100 MG: 100 CAPSULE ORAL at 16:34

## 2020-07-01 RX ADMIN — SODIUM CHLORIDE, PRESERVATIVE FREE 10 ML: 5 INJECTION INTRAVENOUS at 10:23

## 2020-07-01 RX ADMIN — REGADENOSON 0.4 MG: 0.08 INJECTION, SOLUTION INTRAVENOUS at 13:57

## 2020-07-01 RX ADMIN — METOPROLOL TARTRATE 25 MG: 50 TABLET, FILM COATED ORAL at 10:21

## 2020-07-01 RX ADMIN — MELATONIN 6 MG: at 23:47

## 2020-07-01 RX ADMIN — IPRATROPIUM BROMIDE AND ALBUTEROL SULFATE 1 AMPULE: .5; 3 SOLUTION RESPIRATORY (INHALATION) at 15:48

## 2020-07-01 RX ADMIN — METOPROLOL TARTRATE 25 MG: 50 TABLET, FILM COATED ORAL at 21:40

## 2020-07-01 RX ADMIN — Medication 30 MILLICURIE: at 14:05

## 2020-07-01 RX ADMIN — ASPIRIN 81 MG: 81 TABLET, COATED ORAL at 10:21

## 2020-07-01 RX ADMIN — SODIUM CHLORIDE, PRESERVATIVE FREE 10 ML: 5 INJECTION INTRAVENOUS at 21:42

## 2020-07-01 RX ADMIN — ENOXAPARIN SODIUM 40 MG: 40 INJECTION SUBCUTANEOUS at 10:21

## 2020-07-01 RX ADMIN — AMINOPHYLLINE DIHYDRATE 75 MG: 25 INJECTION, SOLUTION INTRAVENOUS at 14:06

## 2020-07-01 ASSESSMENT — PAIN SCALES - GENERAL: PAINLEVEL_OUTOF10: 3

## 2020-07-01 ASSESSMENT — PAIN DESCRIPTION - PROGRESSION: CLINICAL_PROGRESSION: GRADUALLY WORSENING

## 2020-07-01 ASSESSMENT — PAIN - FUNCTIONAL ASSESSMENT: PAIN_FUNCTIONAL_ASSESSMENT: ACTIVITIES ARE NOT PREVENTED

## 2020-07-01 ASSESSMENT — PAIN DESCRIPTION - ONSET: ONSET: ON-GOING

## 2020-07-01 ASSESSMENT — PAIN DESCRIPTION - FREQUENCY: FREQUENCY: CONTINUOUS

## 2020-07-01 ASSESSMENT — PAIN DESCRIPTION - ORIENTATION: ORIENTATION: LEFT;RIGHT

## 2020-07-01 ASSESSMENT — PAIN DESCRIPTION - DESCRIPTORS: DESCRIPTORS: CRAMPING

## 2020-07-01 ASSESSMENT — PAIN DESCRIPTION - LOCATION: LOCATION: LEG

## 2020-07-01 ASSESSMENT — PAIN DESCRIPTION - PAIN TYPE: TYPE: ACUTE PAIN

## 2020-07-01 NOTE — PLAN OF CARE
Problem: Pain:  Goal: Pain level will decrease  Description: Pain level will decrease  7/1/2020 1055 by Michael Baltazar RN  Outcome: Ongoing  7/1/2020 0236 by Dorothy Solorzano RN  Outcome: Ongoing  Goal: Control of acute pain  Description: Control of acute pain  7/1/2020 1055 by Michael Baltazar RN  Outcome: Ongoing  7/1/2020 0236 by Dorothy Solorzano RN  Outcome: Ongoing  Goal: Control of chronic pain  Description: Control of chronic pain  7/1/2020 1055 by Michael Baltazar RN  Outcome: Ongoing  7/1/2020 0236 by Dorothy Solorzano RN  Outcome: Ongoing     Problem: Falls - Risk of:  Goal: Will remain free from falls  Description: Will remain free from falls  7/1/2020 1055 by Michael Baltazar RN  Outcome: Ongoing  7/1/2020 0236 by Dorothy Solorzano RN  Outcome: Ongoing  Goal: Absence of physical injury  Description: Absence of physical injury  7/1/2020 1055 by Michael Baltazar RN  Outcome: Ongoing  7/1/2020 0236 by Dorothy Solorzano RN  Outcome: Ongoing     Problem: Discharge Planning:  Goal: Discharged to appropriate level of care  Description: Discharged to appropriate level of care  7/1/2020 1055 by Michael Baltazar RN  Outcome: Ongoing  7/1/2020 0236 by Dorothy Solorzano RN  Outcome: Ongoing     Problem: Fluid Volume - Imbalance:  Goal: Will show no signs and symptoms of excessive bleeding  Description: Will show no signs and symptoms of excessive bleeding  7/1/2020 1055 by Michael Baltazar RN  Outcome: Ongoing  7/1/2020 0236 by Dorothy Solorzano RN  Outcome: Ongoing  Goal: Absence of imbalanced fluid volume signs and symptoms  Description: Absence of imbalanced fluid volume signs and symptoms  7/1/2020 1055 by Michael Baltazar RN  Outcome: Ongoing  7/1/2020 0236 by Dorothy Solorzano RN  Outcome: Ongoing     Problem: Anxiety:  Goal: Level of anxiety will decrease  Description: Level of anxiety will decrease  7/1/2020 1055 by Michael Baltazar RN  Outcome: Ongoing  7/1/2020 0236 by Lisa Swan RN  Outcome: Ongoing     Problem: Cardiac Output - Decreased:  Goal: Cardiac output within specified parameters  Description: Cardiac output within specified parameters  7/1/2020 1055 by Morro Sanches RN  Outcome: Ongoing  7/1/2020 0236 by Lisa Swan RN  Outcome: Ongoing     Problem: Serum Glucose Level - Abnormal:  Goal: Ability to maintain appropriate glucose levels will improve  Description: Ability to maintain appropriate glucose levels will improve  7/1/2020 1055 by Morro Sanches RN  Outcome: Ongoing  7/1/2020 0236 by Lisa Swan RN  Outcome: Ongoing     Problem: Tissue Perfusion - Cardiopulmonary, Altered:  Goal: Circulatory function within specified parameters  Description: Circulatory function within specified parameters  7/1/2020 1055 by Morro Sanches RN  Outcome: Ongoing  7/1/2020 0236 by Lisa Swan RN  Outcome: Ongoing  Goal: Absence of angina  Description: Absence of angina  7/1/2020 1055 by Morro Sanches RN  Outcome: Ongoing  7/1/2020 0236 by Lisa Swan RN  Outcome: Ongoing  Goal: Hemodynamic stability will improve  Description: Hemodynamic stability will improve  7/1/2020 1055 by Morro Sanches RN  Outcome: Ongoing  7/1/2020 0236 by Lisa Swan RN  Outcome: Ongoing

## 2020-07-01 NOTE — PROGRESS NOTES
Cardiology Note    Today's plan: ok to have stress test, ordered  Admit Date:  6/28/2020    Admission diagnosis / Complaint: chest pain      Subjective:  Ms. Sylwia Hooper is resting in bed. Denies complaints. Reports she is still wheezing. Seems to have improved. She states she does not have a productive cough      Assessment and Plan:    1. Chest pain: Most likely from wheezing and COPD exacerbation. COPD is better and ok for stress test today, NM stress test ordered  2. Acute exacerbation of COPD per primary team    3. CAD patient did not follow-up after left heart cath unsure if she continued her Effient statins and aspirin. Objective:   /73   Pulse 73   Temp 97.8 °F (36.6 °C) (Oral)   Resp 18   Ht 5' 9\" (1.753 m)   Wt 274 lb (124.3 kg)   LMP 06/13/2018   SpO2 96%   BMI 40.46 kg/m²       Intake/Output Summary (Last 24 hours) at 7/1/2020 1245  Last data filed at 6/30/2020 2224  Gross per 24 hour   Intake 10 ml   Output --   Net 10 ml       TELEMETRY: Sinus    has a past medical history of CAD (coronary artery disease) and MRSA infection. has a past surgical history that includes Cholecystectomy (1992); skin biopsy; and Coronary angioplasty with stent.   Physical Exam:  General:  Awake, alert, NAD  Skin:  Warm and dry  Neck:  JVD not present  Chest: Bilateral upper and lower lobe rhonchi noted  Cardiovascular:  RRR S1S2  Abdomen:  Soft nontender  Extremities:  Trace edema    Medications:    methylPREDNISolone  40 mg Intravenous Q12H    sodium chloride flush  10 mL Intravenous BID    ipratropium-albuterol  1 ampule Inhalation Q4H WA    aspirin EC  81 mg Oral Daily    atorvastatin  80 mg Oral Nightly    metoprolol tartrate  25 mg Oral BID    nicotine  1 patch Transdermal Q24H    prasugrel  10 mg Oral Daily    sodium chloride flush  10 mL Intravenous 2 times per day    enoxaparin  40 mg Subcutaneous Daily       melatonin, sodium chloride flush, acetaminophen **OR** acetaminophen, polyethylene glycol, promethazine **OR** ondansetron, morphine    Lab Data:  CBC:   Recent Labs     06/28/20  1758   WBC 8.9   HGB 12.5   HCT 38.2   MCV 90.3        BMP:   Recent Labs     06/28/20  1758 06/30/20  0531    139   K 3.7 4.6    106   CO2 25 25   BUN 18 14   CREATININE 1.1 0.8     LIVER PROFILE:   Recent Labs     06/28/20  1758   AST 17   ALT 13   BILITOT 0.4   ALKPHOS 71     PT/INR: No results for input(s): PROTIME, INR in the last 72 hours. APTT: No results for input(s): APTT in the last 72 hours. BNP:  No results for input(s): BNP in the last 72 hours. TROPONIN: @TROPONINI:3@          HUMZA Beatty-CNP 7/1/2020 12:45 PM     I have seen ,spoken to  and examined this patient personally, independently of the nurse practitioner. I have reviewed the hospital care given to date and reviewed all pertinent labs and imaging. The plan was developed mutually at the time of the visit with the patient,  NP  and myself. I have spoken with patient, nursing staff and provided written and verbal instructions . The above note has been reviewed and I agree with the assessment, diagnosis, and treatment plan with changes made by me as follows     CARDIOLOGY ATTENDING ADDENDUM    HPI:  I have reviewed the above HPI  And agree with above   Florina is a 53 y. o.year old who and presents with had concerns including Chest Pain.   Chief Complaint   Patient presents with    Chest Pain     Interval history:  Stress test today    Physical Exam:  General:  Awake, alert, NAD  Head:normal  Eye:normal  Neck:  No JVD   Chest: + wheezing  Cardiovascular:  RRR S1S2  Abdomen:   nontender  Extremities:  no edema  Pulses; palpable  Neuro: grossly normal      MEDICAL DECISION MAKING;    I agree with the above plan, which was planned by myself and discussed with NP.

## 2020-07-01 NOTE — PROGRESS NOTES
Hospitalist Progress Note      Name:  Siria Murillo /Age/Sex: 1970  (53 y.o. female)   MRN & CSN:  [id number] & [id number] Admission Date/Time: 2020  5:43 PM   Location:  [address] [place] [address] PCP: Sarah Jordan MD         Hospital Day: 4    Assessment and Plan:   Siria Murillo is a 53 y.o.  female  who presents with:     Acute COPD in exacerbation. Acute respiratory failure not requiring oxygen support. Steroids, breathing treatment. Resolved.  Chronic CAD s/p STEMI with PCI in 2019. · Planning stress once copd controlled. Effient, lipitor, lopressor  · Discussed with cardiology today    Chronic BL LE weakness and hip pains. Denies imaging to history. Chart reviewed and no imaging in past. Ambulated with walker but uses a cane. · CT lumbar, hips today    HLD, statin   Chronic tobacco abuse, continued use. Nicoderm patch    Chronic headaches   Chronic anxiety, uncontrolled     Morbid obesity with BMI of 40    Diet DIET CARDIAC;   DVT Prophylaxis [] Lovenox, []  Heparin, [] SCDs, []No VTE prophylaxis, patient ambulating   GI Prophylaxis [] PPI, [] H2 Blocker, [] No GI prophylaxis, patient is receiving diet/Tube Feeds   Code Status Full Code   Disposition Patient requires continued admission due to pending stress test  Dc home in AM   MDM [] Low, [x] Moderate,[]  High  Patient's risk as above due to     [] One or more chronic illnesses with mild exacerbation progression    [x] Two or more stable chronic illnesses    [] Undiagnosed new problem with uncertain prognosis    [] Elective major surgery    []Prescription drug management     History of Present Illness:     Pt S&E. Sitting up in bed. Denies cp or sob  Ambulated with walker without sob    10-14 point ROS reviewed negative, unless as noted above    Objective:        Intake/Output Summary (Last 24 hours) at 2020 0847  Last data filed at 2020 2224  Gross per 24 hour   Intake 10 ml   Output --   Net 10 ml      Vitals:   Vitals:

## 2020-07-02 VITALS
SYSTOLIC BLOOD PRESSURE: 96 MMHG | HEIGHT: 69 IN | TEMPERATURE: 98 F | OXYGEN SATURATION: 97 % | RESPIRATION RATE: 18 BRPM | BODY MASS INDEX: 40.58 KG/M2 | HEART RATE: 89 BPM | DIASTOLIC BLOOD PRESSURE: 56 MMHG | WEIGHT: 274 LBS

## 2020-07-02 PROCEDURE — 6370000000 HC RX 637 (ALT 250 FOR IP): Performed by: STUDENT IN AN ORGANIZED HEALTH CARE EDUCATION/TRAINING PROGRAM

## 2020-07-02 PROCEDURE — 6370000000 HC RX 637 (ALT 250 FOR IP): Performed by: INTERNAL MEDICINE

## 2020-07-02 PROCEDURE — G0378 HOSPITAL OBSERVATION PER HR: HCPCS

## 2020-07-02 RX ORDER — GABAPENTIN 100 MG/1
100 CAPSULE ORAL DAILY PRN
Qty: 14 CAPSULE | Refills: 0 | Status: SHIPPED | OUTPATIENT
Start: 2020-07-02 | End: 2020-07-23

## 2020-07-02 RX ADMIN — PRASUGREL 10 MG: 10 TABLET, FILM COATED ORAL at 10:11

## 2020-07-02 RX ADMIN — ASPIRIN 81 MG: 81 TABLET, COATED ORAL at 10:12

## 2020-07-02 RX ADMIN — GABAPENTIN 100 MG: 100 CAPSULE ORAL at 10:11

## 2020-07-02 NOTE — DISCHARGE SUMMARY
Discharge Summary    Name:  Gurmeet Salas /Age/Sex: 1970  (53 y.o. female)   MRN & CSN:  [id number] & [id number] Admission Date/Time: 2020  5:43 PM   Attending:  Gayle Gaines DO Discharging Physician: Gayle Gaines DO     HPI and Hospital Course:   Gurmeet Salas is a 53 y.o.  female  who presents with Chest Pain    · Acute COPD in exacerbation. Acute respiratory failure not requiring oxygen support. Steroids, breathing treatment. Resolved. · Chronic CAD s/p STEMI with PCI in 2019. Effient, lipitor, lopressor. Stress test completed prior to discharge without acute findings. · Chronic BL LE weakness and hip pains. Denies imaging to history. Chart reviewed and no imaging in past. Ambulated with walker but uses a cane. CT hips and lumbar shows stenosis and joint degeneration. § OP referral to ortho surg for evaluation. § Pain b/w gabapentin and a rx was given   · HLD, statin  · Chronic tobacco abuse, continued use. Nicoderm patch   · Chronic headaches  · Chronic anxiety, uncontrolled    · Morbid obesity with BMI of 40  § Given OP referral to bariatric surgery     The patient expressed appropriate understanding of and agreement with the discharge recommendations, medications, and plan.      Consults this admission:  IP CONSULT TO HOSPITALIST  IP CONSULT TO CARDIOLOGY    Discharge Instruction:   Follow up appointments: ortho, GSx/bariatrics   Primary care physician:  within 2 weeks    Diet:  General/cardiac/ADA/as tolerated  Activity: {discharge activity: as tolerated  Disposition: Discharged to:   [x]Home, []C, []SNF, []Acute Rehab, []Hospice   Condition on discharge: Stable    Discharge Medications:      [facility]   Home Medication Instructions VERONICA:[id number]    Printed on:20 0841   Medication Information                      aspirin 81 MG EC tablet  Take 1 tablet by mouth daily for 90 doses             atorvastatin (LIPITOR) 80 MG tablet  Take 1 tablet by mouth

## 2020-07-02 NOTE — PROGRESS NOTES
CLINICAL PHARMACY NOTE: MEDS TO [address] Select Patient?: No  Total # of Prescriptions Filled: 1   The following medications were delivered to the patient:  · Gabapentin 100mg  Total # of Interventions Completed: 0  Time Spent (min): 15    Additional Documentation:

## 2020-07-07 ENCOUNTER — TELEPHONE (OUTPATIENT)
Dept: ORTHOPEDIC SURGERY | Age: 50
End: 2020-07-07

## 2020-07-07 LAB
EKG ATRIAL RATE: 80 BPM
EKG DIAGNOSIS: NORMAL
EKG P AXIS: 25 DEGREES
EKG P-R INTERVAL: 150 MS
EKG Q-T INTERVAL: 412 MS
EKG QRS DURATION: 106 MS
EKG QTC CALCULATION (BAZETT): 475 MS
EKG R AXIS: 108 DEGREES
EKG T AXIS: 65 DEGREES
EKG VENTRICULAR RATE: 80 BPM

## 2020-07-07 NOTE — TELEPHONE ENCOUNTER
Pt was in hospital on 06/28/20 and had CT of left and right hip pain. She wants to schedule an appt. Please advise.

## 2020-07-17 PROBLEM — E78.2 MIXED HYPERLIPIDEMIA: Status: ACTIVE | Noted: 2020-07-17

## 2020-07-17 PROBLEM — I25.119 CORONARY ARTERY DISEASE INVOLVING NATIVE CORONARY ARTERY OF NATIVE HEART WITH ANGINA PECTORIS (HCC): Status: ACTIVE | Noted: 2020-07-17

## 2020-07-23 ENCOUNTER — OFFICE VISIT (OUTPATIENT)
Dept: CARDIOLOGY CLINIC | Age: 50
End: 2020-07-23
Payer: COMMERCIAL

## 2020-07-23 VITALS
HEART RATE: 80 BPM | DIASTOLIC BLOOD PRESSURE: 76 MMHG | WEIGHT: 265 LBS | HEIGHT: 69 IN | BODY MASS INDEX: 39.25 KG/M2 | SYSTOLIC BLOOD PRESSURE: 104 MMHG

## 2020-07-23 PROCEDURE — 1036F TOBACCO NON-USER: CPT | Performed by: NURSE PRACTITIONER

## 2020-07-23 PROCEDURE — 99214 OFFICE O/P EST MOD 30 MIN: CPT | Performed by: NURSE PRACTITIONER

## 2020-07-23 PROCEDURE — G8417 CALC BMI ABV UP PARAM F/U: HCPCS | Performed by: NURSE PRACTITIONER

## 2020-07-23 PROCEDURE — G8427 DOCREV CUR MEDS BY ELIG CLIN: HCPCS | Performed by: NURSE PRACTITIONER

## 2020-07-23 ASSESSMENT — ENCOUNTER SYMPTOMS
WHEEZING: 0
ABDOMINAL PAIN: 0
BACK PAIN: 1
SORE THROAT: 0
EYE PAIN: 0
SHORTNESS OF BREATH: 1

## 2020-07-23 NOTE — PROGRESS NOTES
[facility] PHYSICAL REHABILIT[facility] [place]  [address] [facility],Third [address]  Phone: [phone]    Fax [phone]                  Mitch Mendoza MD, Leonor Chu MD, [address] Brock MD, MD Cherie Frost MD Lennard Grill, MD Carie Spire, APRN               Edgardo West, APRN    Ailyn Gonzalez, APRN              Tahira Barrientos, APRN            7/23/2020    RE: Theresa Puri  (1970)                               TO:  Dr. Ant Harris MD  The primary cardiologist is Dr. Gale Barger    CC:   1. Coronary artery disease involving native coronary artery of native heart with angina pectoris ([facility] [address]    2. Mixed hyperlipidemia         HPI:    Thank you for involving me in taking care of your patient Theresa Puri is a 53y.o. year old female with a history as listed above and is being seen in the office today. She is being seen today status post hospitalization. She was admitted June 2020 with acute exacerbation of COPD and chest pain. Cardiology was consulted and the hospital and it was believed chest pain was most likely from exacerbation of COPD. Troponins and proBNP were negative. Stress test was completed and showed normal perfusion and EF. She has a history of having a STEMI in June 2019. At that point she had PCI to the LAD and apex first on a percent occlusion of the LAD. She had 100% occlusion of the distal LAD with no reflow post PCI after using 3.0 x 18 and 2.5 x 23 [place]. She had a arthrectomy for no flow, eventually was able to achieve LE-3 flow from the distal LAD. Circumflex and RCA were widely patent. Theresa Puri reports that she is feeling better. She denies any further chest pain or heaviness. She continues to have mild shortness of breath related with COPD.   She complains of hip and leg pain which does not allow her to participate in any organized exercise she is also noted swelling to the lower extremities. Edema is less in the morning and increases as the day progresses. She complains of leg pain with walking. He denies any palpitations- lightheadedness, or dizziness. Current Outpatient Medications   Medication Sig Dispense Refill    atorvastatin (LIPITOR) 80 MG tablet Take 1 tablet by mouth nightly 90 tablet 3    metoprolol tartrate (LOPRESSOR) 25 MG tablet Take 1 tablet by mouth 2 times daily 180 tablet 3    prasugrel (EFFIENT) 10 MG TABS Take 1 tablet by mouth daily 90 tablet 3    nicotine (NICODERM CQ) 7 MG/24HR Place 1 patch onto the skin every 24 hours 30 patch 3    aspirin 81 MG EC tablet Take 1 tablet by mouth daily for 90 doses 90 tablet 0     No current facility-administered medications for this visit. Allergies: Patient has no known allergies. Past Medical History:   Diagnosis Date    CAD (coronary artery disease)     MRSA infection      Past Surgical History:   Procedure Laterality Date    CHOLECYSTECTOMY  1992    CORONARY ANGIOPLASTY WITH STENT PLACEMENT      SKIN BIOPSY      16 mrsa spot removals, head shoulder stomach back     Family History   Problem Relation Age of Onset    Diabetes Father     Coronary Art Dis Father     Heart Disease Father     High Cholesterol Father     High Blood Pressure Father     Arthritis Sister     Asthma Sister      Social History     Tobacco Use    Smoking status: Former Smoker     Packs/day: 0.25     Types: Cigarettes    Smokeless tobacco: Never Used    Tobacco comment: 3 cigarettes a day    Substance Use Topics    Alcohol use: Not Currently     Comment: occasional        Review of Systems   Constitution: Negative for chills and diaphoresis. HENT: Negative for congestion and sore throat. Eyes: Negative for pain and visual disturbance. Cardiovascular: Positive for dyspnea on exertion. Negative for chest pain and irregular heartbeat. Respiratory: Positive for shortness of breath. Negative for wheezing.     Endocrine: Negative for polydipsia and polyphagia. Hematologic/Lymphatic: Negative for adenopathy and bleeding problem. Skin: Negative for rash. Musculoskeletal: Positive for back pain and joint pain. Gastrointestinal: Negative for abdominal pain and melena. Genitourinary: Negative for flank pain, frequency and hematuria. Neurological: Negative for dizziness and light-headedness. Psychiatric/Behavioral: Negative for depression. The patient is not nervous/anxious. Objective:      Physical Exam:  /76   Pulse 80   Ht 5' 9\" (1.753 m)   Wt 265 lb (120.2 kg)   LMP 06/13/2018   BMI 39.13 kg/m²   Wt Readings from Last 3 Encounters:   07/23/20 265 lb (120.2 kg)   07/01/20 274 lb (124.3 kg)   02/13/20 268 lb 9.6 oz (121.8 kg)     Body mass index is 39.13 kg/m². Physical Exam  Constitutional:       Appearance: She is obese. HENT:      Head: Normocephalic and atraumatic. Right Ear: External ear normal.      Left Ear: External ear normal.      Nose: Nose normal.   Eyes:      General:         Right eye: No discharge. Conjunctiva/sclera: Conjunctivae normal.      Comments: Right eye strabismus   Neck:      Musculoskeletal: No muscular tenderness. Vascular: No carotid bruit. Cardiovascular:      Rate and Rhythm: Normal rate and regular rhythm. Pulses: Normal pulses. Heart sounds: Normal heart sounds. Pulmonary:      Effort: Pulmonary effort is normal.      Breath sounds: Normal breath sounds. No wheezing. Abdominal:      Palpations: Abdomen is soft. Tenderness: There is no abdominal tenderness. There is no guarding. Musculoskeletal:      Right lower leg: Edema present. Left lower leg: Edema present. Skin:     General: Skin is warm and dry. Neurological:      General: No focal deficit present. Mental Status: She is alert and oriented to person, place, and time.    Psychiatric:         Mood and Affect: Mood normal.         Behavior: Behavior normal. DATA  BNP:   Lab Results   Component Value Date    PROBNP 75.84 06/28/2020     CBC:   Lab Results   Component Value Date    WBC 8.9 06/28/2020    RBC 4.23 06/28/2020    HGB 12.5 06/28/2020    HCT 38.2 06/28/2020     06/28/2020     CMP:    Lab Results   Component Value Date     06/30/2020    K 4.6 06/30/2020     06/30/2020    CO2 25 06/30/2020    BUN 14 06/30/2020    CREATININE 0.8 06/30/2020    GFRAA >60 06/30/2020    LABGLOM >60 06/30/2020    GLUCOSE 154 06/30/2020    CALCIUM 9.7 06/30/2020     Hepatic Function Panel:    Lab Results   Component Value Date    ALKPHOS 71 06/28/2020    ALT 13 06/28/2020    AST 17 06/28/2020    PROT 7.1 06/28/2020    BILITOT 0.4 06/28/2020    LABALBU 4.0 06/28/2020     Magnesium:    Lab Results   Component Value Date    MG 2.2 06/30/2020     PT/INR:  No results found for: PROTIME, INR  Lipids:    Lab Results   Component Value Date    TRIG 55 06/30/2020    HDL 53 06/30/2020    LDLDIRECT 100 06/30/2020     Lab Results   Component Value Date    LABA1C 5.6 02/13/2020     TSH:    Lab Results   Component Value Date    TSH 2.01 02/13/2020         Assessment/ Plan:    Patient seen, interviewed and examined. Testing was reviewed. 1. Coronary artery disease involving native coronary artery of native heart with angina pectoris ([facility] [address]  History of STEMI with PCI to LAD  Recent admission to hospital with complaints of chest pain troponins negative x3 EKG nonacute  Chest pain believed to be secondary to COPD exacerbation  Stress test reviewed-normal perfusion and EF  We will continue Effient and aspirin  Consider stopping Effient approximately 3 months  We will decrease metoprolol to half a tablet twice a day as patient's blood pressure is soft      2.  Mixed hyperlipidemia  Recent lipids reviewed  HDL 53   We will continue statin  Have advised patient for weight loss and diet modification  We will have lipids reassessed in 6 months-if not at goal consider adding second agent    3. Chronic venous insufficiency  Lower leg discomfort and swelling  Increases as day progresses  Pain goes away at at bedtime with legs up  We will check venous ultrasound follow-up    4. obesity  bmi 39.13  She is following up with a nutritionist for weight loss  Exercises limited due to degenerative joint disease of the hips           Lifestyle and risk factor modificatons discussed. Various goals are discussed and questions answered. Continue current medications. Appropriate prescriptions are addressed. Questions answered and patient verbalizes understanding. Call for any problems, questions, or concerns.   OV in in 3 months with MD

## 2020-07-24 ENCOUNTER — TELEPHONE (OUTPATIENT)
Dept: CARDIOLOGY CLINIC | Age: 50
End: 2020-07-24

## 2020-07-24 NOTE — TELEPHONE ENCOUNTER
Called the patient to get her scheduled for lower ext shaye ble .  She didn't answer her phone and I lm on her vm for her to call the office back and schedule her appointment

## 2020-07-28 ENCOUNTER — TELEPHONE (OUTPATIENT)
Dept: CARDIOLOGY CLINIC | Age: 50
End: 2020-07-28

## 2020-07-28 NOTE — TELEPHONE ENCOUNTER
Called patient to schedule BLE Venous, no answer. Left message for patient to call office to schedule.  265#

## 2020-07-29 ENCOUNTER — OFFICE VISIT (OUTPATIENT)
Dept: FAMILY MEDICINE CLINIC | Age: 50
End: 2020-07-29
Payer: COMMERCIAL

## 2020-07-29 ENCOUNTER — TELEPHONE (OUTPATIENT)
Dept: CARDIOLOGY CLINIC | Age: 50
End: 2020-07-29

## 2020-07-29 VITALS
DIASTOLIC BLOOD PRESSURE: 81 MMHG | BODY MASS INDEX: 39.43 KG/M2 | HEART RATE: 99 BPM | WEIGHT: 267 LBS | OXYGEN SATURATION: 97 % | SYSTOLIC BLOOD PRESSURE: 117 MMHG

## 2020-07-29 PROCEDURE — G8417 CALC BMI ABV UP PARAM F/U: HCPCS | Performed by: FAMILY MEDICINE

## 2020-07-29 PROCEDURE — G8926 SPIRO NO PERF OR DOC: HCPCS | Performed by: FAMILY MEDICINE

## 2020-07-29 PROCEDURE — G8427 DOCREV CUR MEDS BY ELIG CLIN: HCPCS | Performed by: FAMILY MEDICINE

## 2020-07-29 PROCEDURE — 1036F TOBACCO NON-USER: CPT | Performed by: FAMILY MEDICINE

## 2020-07-29 PROCEDURE — 1111F DSCHRG MED/CURRENT MED MERGE: CPT | Performed by: FAMILY MEDICINE

## 2020-07-29 PROCEDURE — 3023F SPIROM DOC REV: CPT | Performed by: FAMILY MEDICINE

## 2020-07-29 PROCEDURE — 99214 OFFICE O/P EST MOD 30 MIN: CPT | Performed by: FAMILY MEDICINE

## 2020-07-29 NOTE — PROGRESS NOTES
Post-Discharge Transitional Care Management Services or Hospital Follow Up      Kimberly Umaña   YOB: 1970    Date of Office Visit:  7/29/2020  Date of Hospital Admission: 6/28/20  Date of Hospital Discharge: 7/2/20  Readmission Risk Score(high >=14%.  Medium >=10%):No data recorded    Care management risk score Rising risk (score 2-5) and Complex Care (Scores >=6): 0     Non face to face  following discharge, date last encounter closed (first attempt may have been earlier): *No documented post hospital discharge outreach found in the last 14 days *No documented post hospital discharge outreach found in the last 14 days    Call initiated 2 business days of discharge: *No response recorded in the last 14 days     Patient Active Problem List   Diagnosis    STEMI involving right coronary artery ([facility] [address]    STEMI (ST elevation myocardial infarction) ([facility] [address]    Chest pain    Coronary artery disease involving native coronary artery of native heart with angina pectoris ([facility] [address]    Mixed hyperlipidemia    Spinal stenosis of lumbar region    Hip pain, acute, right       No Known Allergies    Medications listed as ordered at the time of discharge from hospital   [place], [address] [facility] Medication Instructions VERONICA:    Printed on:08/11/20 0027   Medication Information                      aspirin 81 MG EC tablet  Take 1 tablet by mouth daily for 90 doses             atorvastatin (LIPITOR) 80 MG tablet  Take 1 tablet by mouth nightly             metoprolol tartrate (LOPRESSOR) 25 MG tablet  Take 0.5 tablets by mouth 2 times daily             prasugrel (EFFIENT) 10 MG TABS  Take 1 tablet by mouth daily                   Medications marked \"taking\" at this time  Outpatient Medications Marked as Taking for the 7/29/20 encounter (Office Visit) with Toshia Caceres MD   Medication Sig Dispense Refill    metoprolol tartrate (LOPRESSOR) 25 MG tablet Take 0.5 tablets by mouth 2 times daily 90 tablet 3    atorvastatin

## 2020-08-06 ENCOUNTER — OFFICE VISIT (OUTPATIENT)
Dept: ORTHOPEDIC SURGERY | Age: 50
End: 2020-08-06
Payer: COMMERCIAL

## 2020-08-06 VITALS — BODY MASS INDEX: 38.51 KG/M2 | HEIGHT: 69 IN | WEIGHT: 260 LBS | RESPIRATION RATE: 18 BRPM

## 2020-08-06 PROCEDURE — 99203 OFFICE O/P NEW LOW 30 MIN: CPT | Performed by: ORTHOPAEDIC SURGERY

## 2020-08-06 PROCEDURE — G8417 CALC BMI ABV UP PARAM F/U: HCPCS | Performed by: ORTHOPAEDIC SURGERY

## 2020-08-06 PROCEDURE — 1036F TOBACCO NON-USER: CPT | Performed by: ORTHOPAEDIC SURGERY

## 2020-08-06 PROCEDURE — G8427 DOCREV CUR MEDS BY ELIG CLIN: HCPCS | Performed by: ORTHOPAEDIC SURGERY

## 2020-08-06 NOTE — PROGRESS NOTES
Patient here for a new patient consult per Desiree Betancur MD for bilateral hip pain right>left. She does have chronic back pain as well contributing to her pain. She uses a quad cane to help ambulate. This is a chronic ongoing issue that is just persistently worsening is effecting her quality of life and ADL'S. She has very limited hip abduction and steps are very difficult. She reports no consectuive treatmevnt nor any surgeries on either hip nor her back. She is scheduled with a spine specialist tomorrow with Dr Cailin Berry for workup of spinal stenosis. Provider needs to conduct a hands on physical today due to patients injury/diagnosis    BILATERAL HIP CT 7/1/2020  Impression    1. No acute osseous abnormality in the lumbar spine and bilateral hips.     2. Multilevel degenerative changes of the lumbar spine with severe    degenerative disc disease at L2-3 and L5-S1.  Mild right foraminal stenosis    at L2-3, L3-4, and L4-5.    3. Severe right and mild left hip degenerative changes.

## 2020-08-06 NOTE — PATIENT INSTRUCTIONS
Continue with weight loss  Continue with follow up with Dr Bridgette Sharpe for back   May call office if PT for back and hips is desired, after appt with Dr Bridgette Sharpe, we will order it  weightbearing and activities as tolerated   Follow up in 2 months for recheck    OTC NSAIDS and Tylenol as needed for pain, if tolerated   Add Tylenol (also known as acetaminophen) as needed   Take 2 extra strength (500 mg) or 3 regular strength (325 mg) up to three times a day  It is OK to take Tylenol in conjunction with NSAID's (Advil, Aleve, Naprosyn, etc.(choose one)if tolerated   If taking other medications that have acetaminophen ensure total acetaminophen dose for any 24 period is less than 3,000 mg

## 2020-08-11 PROBLEM — M48.061 SPINAL STENOSIS OF LUMBAR REGION: Status: ACTIVE | Noted: 2020-08-11

## 2020-08-11 PROBLEM — M25.551 HIP PAIN, ACUTE, RIGHT: Status: ACTIVE | Noted: 2020-08-11

## 2020-08-11 ASSESSMENT — ENCOUNTER SYMPTOMS
WHEEZING: 0
SHORTNESS OF BREATH: 0
COUGH: 0

## 2020-08-13 ASSESSMENT — ENCOUNTER SYMPTOMS
COLOR CHANGE: 0
BACK PAIN: 1
EYE REDNESS: 0
VOMITING: 0
CHEST TIGHTNESS: 0
EYE PAIN: 0
WHEEZING: 0
SHORTNESS OF BREATH: 0

## 2020-08-13 NOTE — PROGRESS NOTES
8/6/2020   Chief Complaint   Patient presents with    Hip Pain     bilateral R-L    Back Pain        History of Present Illness:                             Annette Gaming is a 53 y.o. female who presents today for evaluation of her bilateral right worse than left hip pain. She complains of constant aching soreness primarily in her right hip makes difficult to perform a simple activities of daily living. She has severe pain throughout the day and also at night which is affecting her sleep. She has difficulty performing activities of daily living such as getting up from a seated position, walking, navigating stairs, and getting dressed. She has noticed progressive deterioration of her quality of life and increase in her symptoms over the last 5 to 10 years. She has had no treatment in the past for these issues and has been relying on the use of a walker for ambulation. She also complains of chronic low back pain and has an appointment upcoming with a neurologist regarding her back issues with a work-up for spinal stenosis. Patient here for a new patient consult per Giuliano Nielsen MD for bilateral hip pain right>left. She does have chronic back pain as well contributing to her pain. She uses a quad cane to help ambulate. This is a chronic ongoing issue that is just persistently worsening is effecting her quality of life and ADL'S. She has very limited hip abduction and steps are very difficult. She reports no consectuive treatmevnt nor any surgeries on either hip nor her back. She is scheduled with a spine specialist tomorrow with Dr Valentine Maya for workup of spinal stenosis.       She was recently admitted to the hospital for other reasons and had a work-up of her hip because of the severity of her pain and dysfunction. X-rays and CT scan were ordered and then she was referred here.     Medical History  Patient's medications, allergies, past medical, surgical, social and family histories were reviewed and updated as appropriate.     Past Medical History:   Diagnosis Date    CAD (coronary artery disease)     MRSA infection      Family History   Problem Relation Age of Onset    Diabetes Father     Coronary Art Dis Father     Heart Disease Father     High Cholesterol Father     High Blood Pressure Father     Arthritis Sister     Asthma Sister      Social History     Socioeconomic History    Marital status: Legally      Spouse name: Adri Sen    Number of children: 1    Years of education: 15    Highest education level: None   Occupational History    None   Social Needs    Financial resource strain: None    Food insecurity     Worry: None     Inability: None    Transportation needs     Medical: None     Non-medical: None   Tobacco Use    Smoking status: Former Smoker     Packs/day: 0.25     Types: Cigarettes    Smokeless tobacco: Never Used    Tobacco comment: 3 cigarettes a day    Substance and Sexual Activity    Alcohol use: Not Currently     Comment: occasional    Drug use: Yes     Frequency: 2.0 times per week     Types: Marijuana     Comment: none since heart attack     Sexual activity: Not Currently   Lifestyle    Physical activity     Days per week: None     Minutes per session: None    Stress: None   Relationships    Social connections     Talks on phone: None     Gets together: None     Attends [employer] service: None     Active member of club or organization: None     Attends meetings of clubs or organizations: None     Relationship status: None    Intimate partner violence     Fear of current or ex partner: None     Emotionally abused: None     Physically abused: None     Forced sexual activity: None   Other Topics Concern    None   Social History Narrative    None     Current Outpatient Medications   Medication Sig Dispense Refill    metoprolol tartrate (LOPRESSOR) 25 MG tablet Take 0.5 tablets by mouth 2 times daily 90 tablet 3    atorvastatin (LIPITOR) 80 MG tablet Take 1 tablet by mouth nightly 90 tablet 3    prasugrel (EFFIENT) 10 MG TABS Take 1 tablet by mouth daily 90 tablet 3    aspirin 81 MG EC tablet Take 1 tablet by mouth daily for 90 doses 90 tablet 0     No current facility-administered medications for this visit. No Known Allergies    Review of Systems:   Review of Systems   Constitutional: Negative for chills and fever. HENT: Negative for congestion and sneezing. Eyes: Negative for pain and redness. Respiratory: Negative for chest tightness, shortness of breath and wheezing. Cardiovascular: Negative for chest pain and palpitations. Gastrointestinal: Negative for vomiting. Musculoskeletal: Positive for arthralgias, back pain and gait problem. Skin: Negative for color change and rash. Neurological: Positive for weakness. Negative for numbness. Psychiatric/Behavioral: Negative for agitation. The patient is not nervous/anxious. Examination:  General Exam:  Vitals: Resp 18   Ht 5' 9\" (1.753 m)   Wt 260 lb (117.9 kg)   LMP 06/13/2018   BMI 38.40 kg/m²    Physical Exam  Vitals signs and nursing note reviewed. Constitutional:       Appearance: Normal appearance. HENT:      Head: Normocephalic and atraumatic. Eyes:      Conjunctiva/sclera: Conjunctivae normal.      Pupils: Pupils are equal, round, and reactive to light. Neck:      Musculoskeletal: Normal range of motion. Pulmonary:      Effort: Pulmonary effort is normal.   Musculoskeletal:      Left hip: She exhibits decreased range of motion and tenderness. She exhibits normal strength, no bony tenderness, no swelling and no deformity. Right knee: She exhibits normal range of motion, no swelling, no effusion, no ecchymosis, no laceration, no erythema, no LCL laxity, no bony tenderness and no MCL laxity. No medial joint line and no lateral joint line tenderness noted.       Left knee: Normal.      Lumbar back: She exhibits decreased range of motion, tenderness, pain and spasm. She exhibits no deformity. Comments: Right Lower Extremity:  There is moderate tenderness to palpation diffusely throughout the hip both anteriorly and posteriorly. Range of motion is significantly limited and painful at the extremes of motion. Hip flexion present to 90 degrees, abduction 20 degrees, extension 5 degrees, internal rotation 10 degrees, external rotation 10 degrees. Strength is 5 out of 5 with hip flexion, extension, and abduction however this is somewhat limited due to pain with resistance testing. MAGDA and FADIR test reproduces pain to the groin and hip joint. Sensation is intact to light touch in the left lower extremity. Pulses are intact. Skin is intact without erythema. No lower extremity edema. Left Lower Extremity:  There is mild to moderate tenderness to palpation diffusely throughout the hip both anteriorly and posteriorly. Range of motion is mildly limited and painful at the extremes of motion. Hip flexion present to 110 degrees, abduction 30 degrees, extension 10 degrees, internal rotation 15 degrees, external rotation 20 degrees. Strength is 5 out of 5 with hip flexion, extension, and abduction however this is somewhat limited due to pain with resistance testing. Sensation is intact to light touch in the left lower extremity. Pulses are intact. Skin is intact without erythema. No lower extremity edema. Skin:     General: Skin is warm and dry. Neurological:      Mental Status: She is alert and oriented to person, place, and time.    Psychiatric:         Mood and Affect: Mood normal.         Behavior: Behavior normal.            Diagnostic testing:  X-rays reviewed in office, I independently reviewed the films in the office today:   AP pelvis and frog-leg lateral view of the right hip show evidence of    severe advanced degenerative joint disease of the right hip with complete    bone-on-bone articulation and loss of joint space through the superior    weightbearing portion of the hip joint. Bethena Lighter is a prominent osteophyte    formation superior and inferiorly at the at the joint. Bethena Lighter is evidence    of calcifications and loose body present at the inferior aspect of the hip    joint.  No evidence of fracture.  Moderate shortening and lateral    displacement of the femoral head compared to the acetabulum.  No evidence    of fracture or acute abnormality. CT scan images of the right hip were reviewed by myself and discussed with the patient today:    BILATERAL HIP CT 7/1/2020  Impression    1. No acute osseous abnormality in the lumbar spine and bilateral hips. 2. Multilevel degenerative changes of the lumbar spine with severe    degenerative disc disease at L2-3 and L5-S1.  Mild right foraminal stenosis    at L2-3, L3-4, and L4-5.    3. Severe right and mild left hip degenerative changes.                Office Procedures:  No orders of the defined types were placed in this encounter. Assessment and Plan  1. Bilateral hip primary osteoarthritis, right severe    2. Chronic degenerative disease lumbar spine    We reviewed the x-ray findings and CT scan findings of her hip today. I explained to her that there is evidence of severe degenerative changes in the right hip with bone-on-bone appearance and complete loss of her joint space. I have explained to her that given the severity of her symptoms and exam and x-rays that she would likely be a candidate for a total hip replacement. I have recommended that she complete the work-up and evaluation of her lumbar spine prior to proceeding with hip surgery. We discussed the potential for some crossover and referred pain between the hip and the back. I have discussed with her the potential for physical therapy to help in some ways with the hip in the back.   I recommended that she discussed this with the spine physician that she sees tomorrow and she will call here if she would like to have an order placed for physical therapy. I have counseled her on the importance of weight loss to help with her joint issues and she understands this and is beginning a weight loss process at this time. We discussed the use of over-the-counter anti-inflammatory medications as needed for pain. I recommended continued use of her walker to help offload her arthritic right joint and prevent falls. I would like her to follow-up here in 2 months for check of her response of her concerning continued conservative treatments. If her back is stable we may be able to discuss moving forward with right total hip replacement.     Electronically signed by Lobo Sanches MD on 8/13/2020 at 8:10 AM

## 2020-08-20 ENCOUNTER — HOSPITAL ENCOUNTER (OUTPATIENT)
Dept: MRI IMAGING | Age: 50
Discharge: HOME | End: 2020-08-20
Payer: COMMERCIAL

## 2020-08-20 PROCEDURE — 72148 MRI LUMBAR SPINE W/O DYE: CPT

## 2020-10-05 ENCOUNTER — TELEPHONE (OUTPATIENT)
Dept: CARDIOLOGY CLINIC | Age: 50
End: 2020-10-05

## 2020-10-06 ENCOUNTER — INITIAL CONSULT (OUTPATIENT)
Dept: PULMONOLOGY | Age: 50
End: 2020-10-06
Payer: COMMERCIAL

## 2020-10-06 VITALS
WEIGHT: 260 LBS | HEIGHT: 69 IN | BODY MASS INDEX: 38.51 KG/M2 | TEMPERATURE: 98.5 F | HEART RATE: 94 BPM | OXYGEN SATURATION: 98 %

## 2020-10-06 PROCEDURE — G8427 DOCREV CUR MEDS BY ELIG CLIN: HCPCS | Performed by: NURSE PRACTITIONER

## 2020-10-06 PROCEDURE — G8417 CALC BMI ABV UP PARAM F/U: HCPCS | Performed by: NURSE PRACTITIONER

## 2020-10-06 PROCEDURE — 94060 EVALUATION OF WHEEZING: CPT | Performed by: NURSE PRACTITIONER

## 2020-10-06 PROCEDURE — G8484 FLU IMMUNIZE NO ADMIN: HCPCS | Performed by: NURSE PRACTITIONER

## 2020-10-06 PROCEDURE — 99204 OFFICE O/P NEW MOD 45 MIN: CPT | Performed by: NURSE PRACTITIONER

## 2020-10-06 PROCEDURE — 3023F SPIROM DOC REV: CPT | Performed by: NURSE PRACTITIONER

## 2020-10-06 PROCEDURE — G8925 SPIR FEV1/FVC>=60% & NO COPD: HCPCS | Performed by: NURSE PRACTITIONER

## 2020-10-06 RX ORDER — ALBUTEROL SULFATE 90 UG/1
2 AEROSOL, METERED RESPIRATORY (INHALATION) EVERY 6 HOURS PRN
Qty: 1 INHALER | Refills: 5 | Status: ON HOLD | OUTPATIENT
Start: 2020-10-06 | End: 2022-10-21 | Stop reason: HOSPADM

## 2020-10-06 NOTE — PROGRESS NOTES
Subjective:   CHIEF COMPLAINT / HPI:       Rj Quezada is a 53 y.o. female with history of intermittent productive cough, shortness of breath on exertion, smoker, CAD, STEMI, HLD chronic back pain, presents today to the pulmonary clinic for evaluation. She states she was hospitalized 6/28 through 7/2/2020 for shortness of breath, COPD exacerbation. She had CTA completed at that time which demonstrated no PE, no acute cardiopulmonary process, small hiatal hernia. She was  discharged on antibiotics or steroids. Magalysamando Rose states since that time she has had increasing shortness of breath on exertion. She states she does have an albuterol rescue inhaler which was given to her at her recent hospitalization which she uses several times a day for shortness of breath but does not feel that she has full effects of the medication. She is a current smoker states she has smoked her entire adult life. She is smoked as much as a pack of cigarettes a day and is currently smoking a half a pack of cigarettes. She denies use of vapor or smokeless nicotine products. She states she would like to quit but is not quite ready to completely quit. She states she has been having lower back pain which she is scheduled to have an L5-S1 transformal dissected me she is also having hip pain for which she follows with Dr. Kingston Pena. Cori Rose states they are practicing social distancing, wearing a mask when out in public, washing hands frequently or using hand . Denies any fever, chills, malaise, change in sensation of taste or smell, headache or lightheadedness. Denies any known contacts with persons with respiratory infection, positive for coronavirus or under investigation for possible coronavirus exposure.     Influenza immunization: She has not received her flu immunization for this year  Pneumococcal immunization: She does not believe that she is received pneumococcal immunization  Smoker current smoker half pack a day  PCP Dr. Ar David MD    Past Medical History:  Past Medical History:   Diagnosis Date    CAD (coronary artery disease)     MRSA infection        Current Medications:      Current Outpatient Medications:     fluticasone-umeclidin-vilant (TRELEGY ELLIPTA) 100-62.5-25 MCG/INH AEPB, Inhale 1 puff into the lungs daily, Disp: 1 each, Rfl: 5    albuterol sulfate HFA (PROVENTIL HFA) 108 (90 Base) MCG/ACT inhaler, Inhale 2 puffs into the lungs every 6 hours as needed for Wheezing, Disp: 1 Inhaler, Rfl: 5    Spacer/Aero-Holding Chambers QUINTON, 1 Device by Does not apply route daily as needed (use with MDI inhaler), Disp: 1 Device, Rfl: 0    metoprolol tartrate (LOPRESSOR) 25 MG tablet, Take 0.5 tablets by mouth 2 times daily, Disp: 90 tablet, Rfl: 3    aspirin 81 MG EC tablet, Take 1 tablet by mouth daily for 90 doses, Disp: 90 tablet, Rfl: 0    No Known Allergies    Social History:    Social History     Socioeconomic History    Marital status: Legally      Spouse name: Eloy Perea    Number of children: 1    Years of education: 15    Highest education level: None   Occupational History    None   Social Needs    Financial resource strain: None    Food insecurity     Worry: None     Inability: None    Transportation needs     Medical: None     Non-medical: None   Tobacco Use    Smoking status: Current Every Day Smoker     Packs/day: 0.50     Types: Cigarettes    Smokeless tobacco: Never Used    Tobacco comment: cut back 3 months ago to 1/2 pack from 2 packs a day   Substance and Sexual Activity    Alcohol use: Not Currently     Comment: occasional    Drug use: Yes     Frequency: 2.0 times per week     Types: Marijuana     Comment: none since heart attack     Sexual activity: Not Currently   Lifestyle    Physical activity     Days per week: None     Minutes per session: None    Stress: None   Relationships    Social connections     Talks on phone: None     Gets together: None     Attends [place], [employer], [id number], [employer], [employer], [id number], [place], [place]    RADIOLOGY:  Portable chest x-ray 6/28/2020  Stable cardiomegaly a, no pulmonary effusions, no acute cardiopulmonary findings    PFT: Office spirometry 10-6-2020   FVC 62% predicted, FEV1 42% predicted, FEV1/FVC 66% predicted, FEF 25 to 75 21% predicted  Following administration of bronchodilator there was significant response to bronchodilator, with noted 24% increase in FEV1. Overall testing indicates severe obstructive with positive bronchodilator response    Assessment      1. SOBOE (shortness of breath on exertion)  2. Chronic obstructive pulmonary disease, unspecified COPD type ([address]  3. Smoker  4. Healthcare maintenance    Plan:  Office spirometry testing demonstrated severe obstructive disease with response to bronchodilator therapy as noted by a 24% increase in FEV1 after bronchodilator given. She is at this time only on albuterol rescue inhaler. I will start her on Trelegy, she has been instructed in use of medication and has returned demonstration of proper use of medication. I will also order her a spacer to be used with her albuterol rescue inhaler. We will get a 6-minute walk test at the hospital as her O2 sat with activity and rest in the office today was 96 to 98%. We have discussed how her weight could also be contributing to her shortness of breath. She states she is following with bariatrics and a dietitian at this time. We have discussed at length with her continuation of cigarette smoking and the risk to her general health especially cardiopulmonary. She is already had STEMI and has CAD along with HLD and HTN. Office spirometry testing demonstrated severe COPD. She is aware if she continues to smoke she is at high risk of further cardiac disease and pulmonary disease.   While I do not believe that she needs oxygen at this time she is aware if she continues to smoke and her pulmonary disease worsens she will need oxygen in the future. We have discussed the need to maintain yearly flu immunization, pneumococcal vaccination. We have discussed Coronavirus precaution including: social distancing when needing to be in public, handwashing practice, wiping items touched in public such as gas pumps, door handles, shopping carts, etc. Self monitoring for infection - fever, chills, cough, SOB. Should they develop symptoms they should call office for further instructions. Return in about 3 months (around 1/6/2021) for Follow Up copd. This dictation was performed with a verbal recognition program and it was checked for errors. It is possible that there are still dictated errors within this office note. Any errors should be brought immediately to my attention for correction. All efforts were made to ensure that this office note is accurate.        Electronically signed by HUMZA Lo CNP on 11/25/2020 at 2:33 PM

## 2020-10-06 NOTE — PATIENT INSTRUCTIONS
Patient Education        Chronic Obstructive Pulmonary Disease (COPD): Care Instructions  Your Care Instructions     Chronic obstructive pulmonary disease (COPD) is a general term for a group of lung diseases, including emphysema and chronic bronchitis. People with COPD have decreased airflow in and out of the lungs, which makes it hard to breathe. The airways also can get clogged with thick mucus. Cigarette smoking is a major cause of COPD. Although there is no cure for COPD, you can slow its progress. Following your treatment plan and taking care of yourself can help you feel better and live longer. Follow-up care is a key part of your treatment and safety. Be sure to make and go to all appointments, and call your doctor if you are having problems. It's also a good idea to know your test results and keep a list of the medicines you take. How can you care for yourself at home? Staying healthy  · Do not smoke. This is the most important step you can take to prevent more damage to your lungs. If you need help quitting, talk to your doctor about stop-smoking programs and medicines. These can increase your chances of quitting for good. · Avoid colds and flu. Get a pneumococcal vaccine shot. If you have had one before, ask your doctor whether you need a second dose. Get the flu vaccine every fall. If you must be around people with colds or the flu, wash your hands often. · Avoid secondhand smoke, air pollution, and high altitudes. Also avoid cold, dry air and hot, humid air. Stay at home with your windows closed when air pollution is bad. Medicines and oxygen therapy  · Take your medicines exactly as prescribed. Call your doctor if you think you are having a problem with your medicine. You may be taking medicines such as:  ? Bronchodilators. These help open your airways and make breathing easier. They are either short-acting (work for 6 to 9 hours) or long-acting (work for 24 hours).  You inhale most bronchodilators, so they start to act quickly. Always carry your quick-relief inhaler with you in case you need it while you are away from home. ? Corticosteroids (prednisone, budesonide). These reduce airway inflammation. They come in pill or inhaled form. You must take these medicines every day for them to work well. · Ask your doctor or pharmacist if a spacer is right for you. A spacer may help you get more inhaled medicine to your lungs. If you use one, ask how to use it properly. · Do not take any vitamins, over-the-counter medicine, or herbal products without talking to your doctor first.  · If your doctor prescribed antibiotics, take them as directed. Do not stop taking them just because you feel better. You need to take the full course of antibiotics. · If you use oxygen therapy, use the flow rate your doctor has recommended. Don't change it without talking to your doctor first. Oxygen therapy boosts the amount of oxygen in your blood and helps you breathe easier. Activity  · Get regular exercise. Walking is an easy way to get exercise. Start out slowly, and walk a little more each day. · Pay attention to your breathing. You are exercising too hard if you can't talk while you exercise. · Take short rest breaks when doing household chores and other activities. · Learn breathing methods--such as breathing through pursed lips--to help you become less short of breath. · If your doctor has not set you up with a pulmonary rehabilitation program, ask if rehab is right for you. Rehab includes exercise programs, education about your disease and how to manage it, help with diet and other changes, and emotional support. Diet  · Eat regular, healthy meals. Use bronchodilators about 1 hour before you eat to make it easier to eat. Eat several small meals instead of three large ones. Drink beverages at the end of the meal. Avoid foods that are hard to chew.   · Eat foods that contain protein so you don't lose muscle mass. · Talk with your doctor if you gain too much weight or if you lose weight without trying. Mental health  · Talk to your family, friends, or a therapist about your feelings. Some people feel frightened, angry, hopeless, helpless, and even guilty. Talking openly about bad feelings can help you cope. If these feelings last, talk to your doctor. When should you call for help? [id number] anytime you think you may need emergency care. For example, call if:  · You have severe trouble breathing. Call your doctor now or seek immediate medical care if:  · You have new or worse trouble breathing. · You cough up blood. · You have a fever. Watch closely for changes in your health, and be sure to contact your doctor if:  · You cough more deeply or more often, especially if you notice more mucus or a change in the color of your mucus. · You have new or worse swelling in your legs or belly. · You are not getting better as expected. Where can you learn more? Go to https://[employer]. org and sign in to your [employer] account. Enter W597 in the Outerstuff box to learn more about \"Chronic Obstructive Pulmonary Disease (COPD): Care Instructions. \"     If you do not have an account, please click on the \"Sign Up Now\" link. Current as of: February 24, 2020               Content Version: 12.5  © 7402-2961 [employer]wise, [employer]orporated. Care instructions adapted under license by [facility] ([facility]). If you have questions about a medical condition or this instruction, always ask your healthcare professional. [place] [address] any warranty or liability for your use of this information.

## 2020-10-07 LAB
EXPIRATORY TIME-POST: NORMAL
EXPIRATORY TIME: NORMAL
FEF 25-75% %CHNG: NORMAL
FEF 25-75% %PRED-POST: NORMAL
FEF 25-75% %PRED-PRE: NORMAL
FEF 25-75% PRED: NORMAL
FEF 25-75%-POST: NORMAL
FEF 25-75%-PRE: NORMAL
FEV1 %PRED-POST: 52.4 %
FEV1 %PRED-PRE: 42.2 %
FEV1 PRED: 3.32 L
FEV1-POST: 1.74 L
FEV1-PRE: 1.4 L
FEV1/FVC %PRED-POST: 75.5 %
FEV1/FVC %PRED-PRE: 66.6 %
FEV1/FVC PRED: 80.3 %
FEV1/FVC-POST: 60.6 %
FEV1/FVC-PRE: 53.5 %
FVC %PRED-POST: 68.5 L
FVC %PRED-PRE: 62.4 %
FVC PRED: 4.19 L
FVC-POST: 2.87 L
FVC-PRE: 2.62 L
PEF %PRED-POST: NORMAL
PEF %PRED-PRE: NORMAL
PEF PRED: NORMAL
PEF%CHNG: NORMAL
PEF-POST: NORMAL
PEF-PRE: NORMAL

## 2020-10-07 ASSESSMENT — PULMONARY FUNCTION TESTS
FVC_PERCENT_PREDICTED_PRE: 62.4
FVC_PRE: 2.62
FVC_POST: 2.87
FEV1_PERCENT_PREDICTED_PRE: 42.2
FEV1_POST: 1.74
FEV1/FVC_PERCENT_PREDICTED_POST: 75.5
FEV1/FVC_PREDICTED: 80.3
FEV1_PRE: 1.40
FEV1_PERCENT_PREDICTED_POST: 52.4
FEV1/FVC_POST: 60.6
FEV1/FVC_PRE: 53.5
FVC_PREDICTED: 4.19
FVC_PERCENT_PREDICTED_POST: 68.5
FEV1/FVC_PERCENT_PREDICTED_PRE: 66.6
FEV1_PREDICTED: 3.32

## 2020-11-12 ENCOUNTER — OFFICE VISIT (OUTPATIENT)
Dept: ORTHOPEDIC SURGERY | Age: 50
End: 2020-11-12
Payer: COMMERCIAL

## 2020-11-12 VITALS
RESPIRATION RATE: 15 BRPM | HEIGHT: 69 IN | OXYGEN SATURATION: 97 % | HEART RATE: 98 BPM | BODY MASS INDEX: 38.51 KG/M2 | WEIGHT: 260 LBS

## 2020-11-12 PROBLEM — M16.11 PRIMARY OSTEOARTHRITIS OF RIGHT HIP: Status: ACTIVE | Noted: 2020-11-12

## 2020-11-12 PROCEDURE — 4004F PT TOBACCO SCREEN RCVD TLK: CPT | Performed by: ORTHOPAEDIC SURGERY

## 2020-11-12 PROCEDURE — G8428 CUR MEDS NOT DOCUMENT: HCPCS | Performed by: ORTHOPAEDIC SURGERY

## 2020-11-12 PROCEDURE — 99213 OFFICE O/P EST LOW 20 MIN: CPT | Performed by: ORTHOPAEDIC SURGERY

## 2020-11-12 PROCEDURE — G8417 CALC BMI ABV UP PARAM F/U: HCPCS | Performed by: ORTHOPAEDIC SURGERY

## 2020-11-12 PROCEDURE — G8484 FLU IMMUNIZE NO ADMIN: HCPCS | Performed by: ORTHOPAEDIC SURGERY

## 2020-11-12 ASSESSMENT — ENCOUNTER SYMPTOMS
BACK PAIN: 1
COLOR CHANGE: 0
SHORTNESS OF BREATH: 0
CHEST TIGHTNESS: 0

## 2020-11-12 NOTE — PATIENT INSTRUCTIONS
Continue weight-bearing as tolerated with cane  Continue range of motion exercises as instructed. Ice and elevate as needed. Tylenol or Motrin for pain. Consult for weight loss today.    Follow up in 3 months

## 2020-11-12 NOTE — PROGRESS NOTES
11/12/2020   Chief Complaint   Patient presents with    Hip Pain     bilateral         History of Present Illness:                             Seun Ayers is a 53 y.o. female who returns today for follow-up of her bilateral right worse than left hip pain. She has severe pain with rotational movements and weightbearing activities of the right hip. She has been recovering from a recent spine procedure. She has not yet begun physical therapy but is due to start soon. She was told that will take about 3 months to recover from her spine procedure to get nerve regeneration. Her symptoms at the hip are unchanged. She has minimal discomfort at rest but this is worse with getting up from a seated position and standing and walking. She uses a cane to help offload stresses across her right hip joint. Pt returns today for FU of the right hip. Pt states she recently had surgery of her lower back. Pt states pain in the right hip at rest is a 0/10. Pt states walking any time will increase her pain. Pt states she has a very difficult time getting up from a sitting to a standing position. Pt states she can no longer go up the stairs or down them do to the pain. Pt states pain is along the lateal aspect of the right hip and will travel into her groin area. Pt states that rest is the only thing that helps with the pain. Pt returns today for FU of the right hip. Pt states she recently had surgery of her lower back. Pt states pain in the right hip at rest is a 0/10. Pt states walking any time will increase her pain. Pt states she has a very difficult time getting up from a sitting to a standing position. Pt states she can no longer go up the stairs or down them do to the pain. Pt states pain is along the lateal aspect of the right hip and will travel into her groin area. Pt states that rest is the only thing that helps with the pain.      Medical History  Patient's medications, allergies, past medical, surgical, social and family histories were reviewed and updated as appropriate. Review of Systems:   Review of Systems   Constitutional: Negative for activity change and fever. Respiratory: Negative for chest tightness and shortness of breath. Cardiovascular: Negative for chest pain. Musculoskeletal: Positive for arthralgias, back pain and gait problem. Skin: Negative for color change. Neurological: Negative for dizziness, weakness and numbness. Psychiatric/Behavioral: The patient is not nervous/anxious. Examination:  General Exam:  Vitals: Pulse 98   Resp 15   Ht 5' 9\" (1.753 m)   Wt 260 lb (117.9 kg)   LMP 06/13/2018   SpO2 97%   BMI 38.40 kg/m²    Physical Exam  Vitals signs and nursing note reviewed. Constitutional:       Appearance: Normal appearance. She is obese. HENT:      Head: Normocephalic and atraumatic. Eyes:      Conjunctiva/sclera: Conjunctivae normal.      Pupils: Pupils are equal, round, and reactive to light. Neck:      Musculoskeletal: Normal range of motion. Pulmonary:      Effort: Pulmonary effort is normal.   Musculoskeletal:      Left hip: She exhibits normal range of motion, normal strength, no tenderness, no bony tenderness, no swelling and no deformity. Right knee: She exhibits normal range of motion, no swelling, no effusion, no ecchymosis, no laceration, no erythema, no LCL laxity, no bony tenderness and no MCL laxity. No medial joint line and no lateral joint line tenderness noted. Left knee: Normal.      Lumbar back: She exhibits decreased range of motion, tenderness and pain. She exhibits no deformity. Comments: Right Lower Extremity:  There is moderate tenderness to palpation diffusely throughout the hip both anteriorly and posteriorly. Range of motion is significantly limited and painful at the extremes of motion.   Hip flexion present to 90 degrees, abduction 10 degrees, extension 5 degrees, internal rotation 10 degrees, external rotation 10 degrees. Strength is 5 out of 5 with hip flexion, extension, and abduction however this is somewhat limited due to pain with resistance testing. MAGDA and FADIR test reproduces pain to the groin and hip joint. Sensation is intact to light touch in the left lower extremity. Pulses are intact. Skin is intact without erythema. No lower extremity edema. Skin:     General: Skin is warm and dry. Neurological:      Mental Status: She is alert and oriented to person, place, and time. Psychiatric:         Mood and Affect: Mood normal.         Behavior: Behavior normal.              Diagnostic testing:  X-rays reviewed in office, I independently reviewed the films in the office today:   X-ray images of the right hip from previous visit were reviewed and show evidence of severe advanced degenerative joint disease of the right hip. Office Procedures:  No orders of the defined types were placed in this encounter. Assessment and Plan  1. Right hip primary osteoarthritis    2. Recent lumbar spine discectomy    I explained the patient that I would recommend holding off at this time regarding possible elective right total hip replacement. I explained to her that with her recent spine surgery I would recommend that she complete the recovery process. She has not yet started therapy for her back but does have a prescription and is due to start soon. I have explained to her my concern about her obesity and elevated BMI. I explained that this will increase her    Risk of complications with respect to a right hip replacement. I have stressed the importance of diet and weight loss prior to proceeding with the right total hip replacement. I have encouraged her to be very diligent about this. I have offered her referral to dietitian or to bariatric surgery. She is opted to defer and would like to do this on her own.     She can follow-up in 3 months for a check of her progress with her spine recovery and also to check her progress with weight loss.       Electronically signed by Clay Dawson MD on 11/12/2020 at 1:31 PM

## 2020-11-12 NOTE — PROGRESS NOTES
Pt returns today for FU of the right hip. Pt states she recently had surgery of her lower back. Pt states pain in the right hip at rest is a 0/10. Pt states walking any time will increase her pain. Pt states she has a very difficult time getting up from a sitting to a standing position. Pt states she can no longer go up the stairs or down them do to the pain. Pt states pain is along the lateal aspect of the right hip and will travel into her groin area. Pt states that rest is the only thing that helps with the pain.

## 2021-10-07 ENCOUNTER — OFFICE VISIT (OUTPATIENT)
Dept: PHYSICAL MEDICINE AND REHAB | Age: 51
End: 2021-10-07
Payer: COMMERCIAL

## 2021-10-07 DIAGNOSIS — M54.42 CHRONIC BILATERAL LOW BACK PAIN WITH LEFT-SIDED SCIATICA: ICD-10-CM

## 2021-10-07 DIAGNOSIS — G89.29 CHRONIC BILATERAL LOW BACK PAIN WITH LEFT-SIDED SCIATICA: ICD-10-CM

## 2021-10-07 DIAGNOSIS — M54.17 LUMBOSACRAL RADICULOPATHY AT L5: Primary | ICD-10-CM

## 2021-10-07 DIAGNOSIS — R20.2 PARESTHESIA OF BILATERAL LEGS: ICD-10-CM

## 2021-10-07 PROCEDURE — 95911 NRV CNDJ TEST 9-10 STUDIES: CPT | Performed by: PHYSICAL MEDICINE & REHABILITATION

## 2021-10-07 PROCEDURE — 95886 MUSC TEST DONE W/N TEST COMP: CPT | Performed by: PHYSICAL MEDICINE & REHABILITATION

## 2021-10-07 NOTE — PROGRESS NOTES
EMG REPORT     CHIEF COMPLAINT: Severe back and left leg pain. HISTORY OF PRESENT ILLNESS: 49 y.o. right a 3-year history of hand dominant female with escalating lower back and leg pains. In October 2020 she had a procedure on her lumbar spine to \"free up the nerve\". She was having sharp back and left leg pain then. Since her procedure, she reports a steady increase in the severity and consistency of her pain. She gets sharp stabbing pains from the left flank and buttock shooting through her thigh to the left foot. Her left foot goes numb. She rated her pain severity as 10/10 \"all the time\". She has trouble standing, sitting or rolling. She also gets pain in the proximal right lower leg which she associates with weightbearing and right hip movement. She has not noted any rashes or limb discoloration. Her sleep is negatively impacted. She does not routinely trip over her left foot, but she does require a walker for safe ambulation. She has no history of diabetes or any thyroid disorder. PHYSICAL EXAMINATION: Alert. Increased lumbar lordosis. Paraspinal and lower limb muscle tenderness to light palpation. No rashes, focal joint swelling or limb discoloration. Very limited right hip passive range of motion tolerance. Negative straight leg raising. 2+ and symmetric knee jerks. Trace and symmetric ankle jerks. Give way MMT with left ankle dorsiflexion and plantarflexion with complaints of pain. Blunted perception touch about the dorsum of the left foot. NERVE CONDUCTION STUDIES:     MOTOR         LATENCY NORMAL AMPLITUDE DISTANCE COND. SAQIB.    R  PERONEAL    < 6.2 msec  8 cm    L  PERONEAL 5.2 < 6.2 msec 0.5 8 cm 41   RIGHT  TIBIAL  < 6.2 msec  8 cm    LEFT TIBIAL 5.5 < 6.2 msec 2.1 8 cm 38   R TIB H REFLEX 32.5 < 50 msec      L TIB H REFLEX 31.9 < 50 msec         SENSORY  ANTIDROMIC        LATENCY NORMAL AMPLITUDE DISTANCE   R SUP PERONEAL  < 3.6 msec  10 cm   L SUP PERONEAL Absent < 3.6 msec  10 cm   RIGHT  SURAL 4.0 < 4.0 msec 8 14 cm   LEFT  SURAL 3.9 < 4.0 msec 6 14 cm         NEEDLE EMG:      RIGHT   LEFT     Insertional Activity Spontaneous  Activity Volutional  MUAP's Insertional Activity Spontaneous  Activity Volutional  MUAP's   Lumbar paraspinals Normal None Normal Increased ++ Polys   Glut Med Normal None Normal Normal None Normal   Rect fem Normal None Normal Normal None Normal   Vast Med Normal None Normal Normal None Normal   Ant Tibialis Normal None Normal Normal None Normal   EHL Normal None Normal Increased ++ Dec #, Polys, Larger   FDL Normal None Normal \" \" \"   Gastroc Normal None Normal \" \" Polys   EDB Normal None Normal \" \" Dec #, Larger   1st dors int Normal None Normal Normal None Normal       FINDINGS:   EMG of the lumbar paraspinals and both lower limbs demonstrated left paraspinal and left lower limb muscle membrane irritability throughout the L5 myotome. In those irritable muscles, motor units were reduced in number, larger and polyphasic. Left peroneal motor evoked amplitude was diminished. The left peroneal sensory response was missing. All other sensory and motor latencies, evoked amplitudes and conduction velocities were reasonably preserved. Tibial H reflexes were symmetric. IMPRESSION:      1. Abnormal EMG. There is evidence of active left L5 spinal nerve root injury (left L5 radiculopathy with acute and chronic electrical features). Correlation with lumbar imaging may help explain these findings. 2.  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 This could represent a double crush phenomenon considering the spinal nerve root injury described above. 3.  No evidence of a concurrent lumbar plexopathy, generalized neuropathy or primary muscle disease.         Thank you for this interesting referral.

## 2021-10-07 NOTE — LETTER
October 7,2021    Connie Griffiths M.D  [address]  [place] [facility] [address]    Patient Name: Armani Barcenas   MRN Number: [id number]   YOB: 1970   Date Of Visit: 10/07/2021      Dear Connie Griffiths MD,      Thank you for referring Armani Barcenas to me for electrodiagnostic testing. Attached are the findings of the EMG and my assessment. If you have any further questions, please do not hesitate to call me. Thank you for this interesting referral.      Sincerely,          ROSSY Mota MD.

## 2021-10-13 ENCOUNTER — TELEPHONE (OUTPATIENT)
Dept: CARDIOLOGY CLINIC | Age: 51
End: 2021-10-13

## 2021-10-13 NOTE — TELEPHONE ENCOUNTER
Cardiologist: Dr. Margret Dodge  Surgeon: Dr. Brittany Jo  Surgery: Right total hip arthroplasty  Anesthesia: Spinal  Date: TBD  FAX# [phone]  # [phone]    Last OV 7/23/2020 w/ Madisyn    1. Coronary artery disease involving native coronary artery of native heart with angina pectoris ([facility] [address]  History of STEMI with PCI to LAD  Recent admission to hospital with complaints of chest pain troponins negative x3 EKG nonacute  Chest pain believed to be secondary to COPD exacerbation  Stress test reviewed-normal perfusion and EF  We will continue Effient and aspirin  Consider stopping Effient approximately 3 months  We will decrease metoprolol to half a tablet twice a day as patient's blood pressure is soft        2. Mixed hyperlipidemia  Recent lipids reviewed  HDL 53   We will continue statin  Have advised patient for weight loss and diet modification  We will have lipids reassessed in 6 months-if not at goal consider adding second agent     3. Chronic venous insufficiency  Lower leg discomfort and swelling  Increases as day progresses  Pain goes away at at bedtime with legs up  We will check venous ultrasound follow-up     4. obesity  bmi 39.13  She is following up with a nutritionist for weight loss  Exercises limited due to degenerative joint disease of the hips         NM- 7/1/2020  normal stress test, normal LVEF, normal perfusion, normal tracer uptake in    all myocardial segment during rest and stress. breast tissue attenuation    artifact and diaphragmatic artifact noted.           Echo- 6/13/2019   Left ventricular systolic function is abnormal.   Ejection fraction is visually estimated at 40-45%. Apical dyskinesis with basal hyperkinesis. Diastolic dysfunction is present. Moderate bilateral atrial enlargement. Thickened appearance of moderator band. Mildly enlarged right ventricle cavity. No evidence of any pericardial effusion. CATH- 6/13/2019   1.  PCI to distal LAD and [place] for STEMI with 100 % occlusion of   distal LAD with no reflow after PCI using 3.0 X 18 and 2.5 X 23   [place]   2. Penumbra atherectomy for no flow , eventually we got LE III   flow for distal LAD   3. LV gram shows Akinetic/ dyskinetic [place] with Hyper   contractile base   4.  Circ and RCA are patent      Aspirin

## 2021-10-13 NOTE — LETTER
[place]  [address] [facility] [address]  [address], [address] [facility],Third [address]  Phone: [phone]    Fax [phone]                  Vinicius Dyer MD, Connor Robison MD, Aldo Pope MD, Candelaria Mckinnon MD, MD Ysabel Kumar MD, MD Gigi Leiva, HUMZA-BRYSON Pereira APRKILLIANBRYSON Payne, APRKILLIAN-BRYSON Calvin APRKILLIANCNP      Cardiac Risk Assessment   10/13/2021        Surgery Date: TBD  Surgery: Right Total Hip Arthroplasty  Anesthesia Type: Spinal  Fax Number: [phone]    To: Dr. Mary Webber  From : SADIE Zaman    Patient Name: Carlos Weathers     YOB: 1970  MRN: [id number]    Carlos Weathers was evaluated from a cardiac standpoint for her surgery or procedure and based on her history, diagnosis, recent cardiac testing, she is considered a medium risk candidate for any zahira-operative cardiac complications. Patients with known CAD with moderate or high risk should have surgical procedures done where they have access to invasive cardiology services if emergently needed. Antiplatelet/anticoagulant therapy can be held prior to the surgery or procedure at the discretion of the surgeon to be resumed as soon as possible if held. Please call with any further questions. Respectfully,     Luis NOEL  Mk/bl    This cardiac clearance is good for 6 months from 10/13/2021 unless new cardiac symptoms arise.

## 2021-11-24 LAB
ALBUMIN SERPL-MCNC: 4.3 G/DL
ALP BLD-CCNC: 82 U/L
ALT SERPL-CCNC: 13 U/L
ANION GAP SERPL CALCULATED.3IONS-SCNC: NORMAL MMOL/L
AST SERPL-CCNC: 17 U/L
BASOPHILS ABSOLUTE: 0.1 /ΜL
BASOPHILS RELATIVE PERCENT: 1 %
BILIRUB SERPL-MCNC: 0.3 MG/DL (ref 0.1–1.4)
BUN BLDV-MCNC: 13 MG/DL
CALCIUM SERPL-MCNC: 9.7 MG/DL
CHLORIDE BLD-SCNC: 105 MMOL/L
CO2: 23 MMOL/L
CREAT SERPL-MCNC: 0.78 MG/DL
EOSINOPHILS ABSOLUTE: 0.1 /ΜL
EOSINOPHILS RELATIVE PERCENT: 2 %
GFR CALCULATED: 89
GLUCOSE BLD-MCNC: 100 MG/DL
HCT VFR BLD CALC: 36 % (ref 36–46)
HEMOGLOBIN: 11.6 G/DL (ref 12–16)
LYMPHOCYTES ABSOLUTE: 1.9 /ΜL
LYMPHOCYTES RELATIVE PERCENT: 25 %
MCH RBC QN AUTO: 26.7 PG
MCHC RBC AUTO-ENTMCNC: 32.2 G/DL
MCV RBC AUTO: 83 FL
MONOCYTES ABSOLUTE: 0.7 /ΜL
MONOCYTES RELATIVE PERCENT: 9 %
NEUTROPHILS ABSOLUTE: 4.8 /ΜL
NEUTROPHILS RELATIVE PERCENT: 63 %
PLATELET # BLD: 471 K/ΜL
PMV BLD AUTO: ABNORMAL FL
POTASSIUM SERPL-SCNC: 4.6 MMOL/L
RBC # BLD: 4.34 10^6/ΜL
SODIUM BLD-SCNC: 143 MMOL/L
TOTAL PROTEIN: 6.5
WBC # BLD: 7.7 10^3/ML

## 2021-11-30 ENCOUNTER — HOSPITAL ENCOUNTER (INPATIENT)
Age: 51
LOS: 5 days | Discharge: HOME | DRG: 313 | End: 2021-12-05
Attending: STUDENT IN AN ORGANIZED HEALTH CARE EDUCATION/TRAINING PROGRAM | Admitting: STUDENT IN AN ORGANIZED HEALTH CARE EDUCATION/TRAINING PROGRAM
Payer: COMMERCIAL

## 2021-11-30 ENCOUNTER — APPOINTMENT (OUTPATIENT)
Dept: CT IMAGING | Age: 51
DRG: 313 | End: 2021-11-30
Payer: COMMERCIAL

## 2021-11-30 ENCOUNTER — APPOINTMENT (OUTPATIENT)
Dept: GENERAL RADIOLOGY | Age: 51
DRG: 313 | End: 2021-11-30
Payer: COMMERCIAL

## 2021-11-30 DIAGNOSIS — I20.0 UNSTABLE ANGINA PECTORIS (HCC): Primary | ICD-10-CM

## 2021-11-30 DIAGNOSIS — M25.551 HIP PAIN, ACUTE, RIGHT: ICD-10-CM

## 2021-11-30 LAB
ALBUMIN SERPL-MCNC: 3.6 GM/DL (ref 3.4–5)
ALP BLD-CCNC: 64 IU/L (ref 40–129)
ALT SERPL-CCNC: 20 U/L (ref 10–40)
ANION GAP SERPL CALCULATED.3IONS-SCNC: 11 MMOL/L (ref 4–16)
AST SERPL-CCNC: 24 IU/L (ref 15–37)
BASOPHILS ABSOLUTE: 0.1 K/CU MM
BASOPHILS RELATIVE PERCENT: 0.7 % (ref 0–1)
BILIRUB SERPL-MCNC: 0.1 MG/DL (ref 0–1)
BUN BLDV-MCNC: 19 MG/DL (ref 6–23)
CALCIUM SERPL-MCNC: 8.7 MG/DL (ref 8.3–10.6)
CHLORIDE BLD-SCNC: 107 MMOL/L (ref 99–110)
CO2: 22 MMOL/L (ref 21–32)
CREAT SERPL-MCNC: 0.9 MG/DL (ref 0.6–1.1)
DIFFERENTIAL TYPE: ABNORMAL
EKG ATRIAL RATE: 85 BPM
EKG DIAGNOSIS: NORMAL
EKG P AXIS: 42 DEGREES
EKG P-R INTERVAL: 134 MS
EKG Q-T INTERVAL: 394 MS
EKG QRS DURATION: 120 MS
EKG QTC CALCULATION (BAZETT): 468 MS
EKG R AXIS: 91 DEGREES
EKG T AXIS: 45 DEGREES
EKG VENTRICULAR RATE: 85 BPM
EOSINOPHILS ABSOLUTE: 0.1 K/CU MM
EOSINOPHILS RELATIVE PERCENT: 0.5 % (ref 0–3)
GFR AFRICAN AMERICAN: >60 ML/MIN/1.73M2
GFR NON-AFRICAN AMERICAN: >60 ML/MIN/1.73M2
GLUCOSE BLD-MCNC: 120 MG/DL (ref 70–99)
HCT VFR BLD CALC: 28.1 % (ref 37–47)
HEMOGLOBIN: 8.5 GM/DL (ref 12.5–16)
IMMATURE NEUTROPHIL %: 0.5 % (ref 0–0.43)
INR BLD: 0.9 INDEX
LYMPHOCYTES ABSOLUTE: 2.6 K/CU MM
LYMPHOCYTES RELATIVE PERCENT: 25 % (ref 24–44)
MCH RBC QN AUTO: 26.5 PG (ref 27–31)
MCHC RBC AUTO-ENTMCNC: 30.2 % (ref 32–36)
MCV RBC AUTO: 87.5 FL (ref 78–100)
MONOCYTES ABSOLUTE: 1 K/CU MM
MONOCYTES RELATIVE PERCENT: 9.2 % (ref 0–4)
NUCLEATED RBC %: 0 %
PDW BLD-RTO: 15.9 % (ref 11.7–14.9)
PLATELET # BLD: 341 K/CU MM (ref 140–440)
PMV BLD AUTO: 8.6 FL (ref 7.5–11.1)
POTASSIUM SERPL-SCNC: 4.2 MMOL/L (ref 3.5–5.1)
PRO-BNP: 78.19 PG/ML
PROTHROMBIN TIME: 11.6 SECONDS (ref 11.7–14.5)
RBC # BLD: 3.21 M/CU MM (ref 4.2–5.4)
SEGMENTED NEUTROPHILS ABSOLUTE COUNT: 6.6 K/CU MM
SEGMENTED NEUTROPHILS RELATIVE PERCENT: 64.1 % (ref 36–66)
SODIUM BLD-SCNC: 140 MMOL/L (ref 135–145)
TOTAL IMMATURE NEUTOROPHIL: 0.05 K/CU MM
TOTAL NUCLEATED RBC: 0 K/CU MM
TOTAL PROTEIN: 5.4 GM/DL (ref 6.4–8.2)
TROPONIN T: <0.01 NG/ML
WBC # BLD: 10.3 K/CU MM (ref 4–10.5)

## 2021-11-30 PROCEDURE — 80053 COMPREHEN METABOLIC PANEL: CPT

## 2021-11-30 PROCEDURE — 36415 COLL VENOUS BLD VENIPUNCTURE: CPT

## 2021-11-30 PROCEDURE — 6370000000 HC RX 637 (ALT 250 FOR IP): Performed by: STUDENT IN AN ORGANIZED HEALTH CARE EDUCATION/TRAINING PROGRAM

## 2021-11-30 PROCEDURE — 1200000000 HC SEMI PRIVATE

## 2021-11-30 PROCEDURE — 85610 PROTHROMBIN TIME: CPT

## 2021-11-30 PROCEDURE — 6370000000 HC RX 637 (ALT 250 FOR IP): Performed by: NURSE PRACTITIONER

## 2021-11-30 PROCEDURE — 93005 ELECTROCARDIOGRAM TRACING: CPT | Performed by: STUDENT IN AN ORGANIZED HEALTH CARE EDUCATION/TRAINING PROGRAM

## 2021-11-30 PROCEDURE — 71275 CT ANGIOGRAPHY CHEST: CPT

## 2021-11-30 PROCEDURE — 2580000003 HC RX 258: Performed by: NURSE PRACTITIONER

## 2021-11-30 PROCEDURE — 85025 COMPLETE CBC W/AUTO DIFF WBC: CPT

## 2021-11-30 PROCEDURE — 71045 X-RAY EXAM CHEST 1 VIEW: CPT

## 2021-11-30 PROCEDURE — 93010 ELECTROCARDIOGRAM REPORT: CPT | Performed by: INTERNAL MEDICINE

## 2021-11-30 PROCEDURE — 6360000004 HC RX CONTRAST MEDICATION: Performed by: STUDENT IN AN ORGANIZED HEALTH CARE EDUCATION/TRAINING PROGRAM

## 2021-11-30 PROCEDURE — 83880 ASSAY OF NATRIURETIC PEPTIDE: CPT

## 2021-11-30 PROCEDURE — 6360000002 HC RX W HCPCS: Performed by: NURSE PRACTITIONER

## 2021-11-30 PROCEDURE — 84484 ASSAY OF TROPONIN QUANT: CPT

## 2021-11-30 PROCEDURE — 99285 EMERGENCY DEPT VISIT HI MDM: CPT

## 2021-11-30 RX ORDER — ACETAMINOPHEN 650 MG/1
650 SUPPOSITORY RECTAL EVERY 6 HOURS PRN
Status: DISCONTINUED | OUTPATIENT
Start: 2021-11-30 | End: 2021-12-05 | Stop reason: HOSPADM

## 2021-11-30 RX ORDER — OXYCODONE HYDROCHLORIDE AND ACETAMINOPHEN 5; 325 MG/1; MG/1
1 TABLET ORAL ONCE
Status: COMPLETED | OUTPATIENT
Start: 2021-11-30 | End: 2021-11-30

## 2021-11-30 RX ORDER — FAMOTIDINE 20 MG/1
20 TABLET, FILM COATED ORAL 2 TIMES DAILY
Status: DISCONTINUED | OUTPATIENT
Start: 2021-11-30 | End: 2021-12-05 | Stop reason: HOSPADM

## 2021-11-30 RX ORDER — SODIUM CHLORIDE 9 MG/ML
25 INJECTION, SOLUTION INTRAVENOUS PRN
Status: DISCONTINUED | OUTPATIENT
Start: 2021-11-30 | End: 2021-12-05 | Stop reason: HOSPADM

## 2021-11-30 RX ORDER — SENNA AND DOCUSATE SODIUM 50; 8.6 MG/1; MG/1
1 TABLET, FILM COATED ORAL 2 TIMES DAILY
Status: DISCONTINUED | OUTPATIENT
Start: 2021-11-30 | End: 2021-12-05 | Stop reason: HOSPADM

## 2021-11-30 RX ORDER — OXYCODONE HYDROCHLORIDE AND ACETAMINOPHEN 5; 325 MG/1; MG/1
1 TABLET ORAL EVERY 6 HOURS PRN
Status: DISCONTINUED | OUTPATIENT
Start: 2021-11-30 | End: 2021-12-05 | Stop reason: HOSPADM

## 2021-11-30 RX ORDER — ALBUTEROL SULFATE 90 UG/1
2 AEROSOL, METERED RESPIRATORY (INHALATION) EVERY 6 HOURS PRN
Status: DISCONTINUED | OUTPATIENT
Start: 2021-11-30 | End: 2021-12-05 | Stop reason: HOSPADM

## 2021-11-30 RX ORDER — SODIUM CHLORIDE 0.9 % (FLUSH) 0.9 %
5-40 SYRINGE (ML) INJECTION EVERY 12 HOURS SCHEDULED
Status: DISCONTINUED | OUTPATIENT
Start: 2021-11-30 | End: 2021-12-05 | Stop reason: HOSPADM

## 2021-11-30 RX ORDER — SODIUM CHLORIDE 0.9 % (FLUSH) 0.9 %
5-40 SYRINGE (ML) INJECTION PRN
Status: DISCONTINUED | OUTPATIENT
Start: 2021-11-30 | End: 2021-12-05 | Stop reason: HOSPADM

## 2021-11-30 RX ORDER — SODIUM PHOSPHATE, DIBASIC AND SODIUM PHOSPHATE, MONOBASIC 7; 19 G/133ML; G/133ML
1 ENEMA RECTAL DAILY PRN
Status: DISCONTINUED | OUTPATIENT
Start: 2021-11-30 | End: 2021-12-05 | Stop reason: HOSPADM

## 2021-11-30 RX ORDER — ONDANSETRON 4 MG/1
4 TABLET, ORALLY DISINTEGRATING ORAL EVERY 8 HOURS PRN
Status: DISCONTINUED | OUTPATIENT
Start: 2021-11-30 | End: 2021-12-05 | Stop reason: HOSPADM

## 2021-11-30 RX ORDER — ATORVASTATIN CALCIUM 40 MG/1
40 TABLET, FILM COATED ORAL NIGHTLY
Status: DISCONTINUED | OUTPATIENT
Start: 2021-11-30 | End: 2021-12-05 | Stop reason: HOSPADM

## 2021-11-30 RX ORDER — ACETAMINOPHEN 325 MG/1
650 TABLET ORAL EVERY 6 HOURS PRN
Status: DISCONTINUED | OUTPATIENT
Start: 2021-11-30 | End: 2021-12-05 | Stop reason: HOSPADM

## 2021-11-30 RX ORDER — ONDANSETRON 2 MG/ML
4 INJECTION INTRAMUSCULAR; INTRAVENOUS EVERY 6 HOURS PRN
Status: DISCONTINUED | OUTPATIENT
Start: 2021-11-30 | End: 2021-12-05 | Stop reason: HOSPADM

## 2021-11-30 RX ORDER — ASPIRIN 81 MG/1
81 TABLET, CHEWABLE ORAL DAILY
Status: DISCONTINUED | OUTPATIENT
Start: 2021-12-01 | End: 2021-12-05 | Stop reason: HOSPADM

## 2021-11-30 RX ORDER — NITROGLYCERIN 0.4 MG/1
0.4 TABLET SUBLINGUAL EVERY 5 MIN PRN
Status: DISCONTINUED | OUTPATIENT
Start: 2021-11-30 | End: 2021-12-05 | Stop reason: HOSPADM

## 2021-11-30 RX ORDER — POLYETHYLENE GLYCOL 3350 17 G/17G
17 POWDER, FOR SOLUTION ORAL DAILY PRN
Status: DISCONTINUED | OUTPATIENT
Start: 2021-11-30 | End: 2021-12-05 | Stop reason: HOSPADM

## 2021-11-30 RX ORDER — BUDESONIDE AND FORMOTEROL FUMARATE DIHYDRATE 160; 4.5 UG/1; UG/1
2 AEROSOL RESPIRATORY (INHALATION) 2 TIMES DAILY
Status: DISCONTINUED | OUTPATIENT
Start: 2021-11-30 | End: 2021-12-01

## 2021-11-30 RX ORDER — NICOTINE 21 MG/24HR
1 PATCH, TRANSDERMAL 24 HOURS TRANSDERMAL DAILY
Status: DISCONTINUED | OUTPATIENT
Start: 2021-11-30 | End: 2021-12-05 | Stop reason: HOSPADM

## 2021-11-30 RX ADMIN — ATORVASTATIN CALCIUM 40 MG: 40 TABLET, FILM COATED ORAL at 20:26

## 2021-11-30 RX ADMIN — ENOXAPARIN SODIUM 40 MG: 100 INJECTION SUBCUTANEOUS at 20:25

## 2021-11-30 RX ADMIN — SENNOSIDES AND DOCUSATE SODIUM 1 TABLET: 50; 8.6 TABLET ORAL at 20:26

## 2021-11-30 RX ADMIN — IOPAMIDOL 90 ML: 755 INJECTION, SOLUTION INTRAVENOUS at 17:06

## 2021-11-30 RX ADMIN — OXYCODONE AND ACETAMINOPHEN 1 TABLET: 5; 325 TABLET ORAL at 23:26

## 2021-11-30 RX ADMIN — SODIUM CHLORIDE, PRESERVATIVE FREE 10 ML: 5 INJECTION INTRAVENOUS at 20:26

## 2021-11-30 RX ADMIN — OXYCODONE AND ACETAMINOPHEN 1 TABLET: 5; 325 TABLET ORAL at 18:05

## 2021-11-30 RX ADMIN — FAMOTIDINE 20 MG: 20 TABLET ORAL at 20:26

## 2021-11-30 ASSESSMENT — PAIN SCALES - GENERAL
PAINLEVEL_OUTOF10: 10
PAINLEVEL_OUTOF10: 7
PAINLEVEL_OUTOF10: 3

## 2021-11-30 NOTE — ED TRIAGE NOTES
Pt to ED with complaints of chest pain. Pt sent to ED from [facility] for possible STEMI.  Dr Ava Hong at bedside upon pt's arrival.

## 2021-11-30 NOTE — ED NOTES
Meal ordered cheeseburger, mac and cheese, brownie, sweet tea.      Sergey Persaud, TAMIR  11/30/21 [id number]

## 2021-11-30 NOTE — ED NOTES
Azeem galvin hospitalist     Alissa Kanner  11/30/21 [phone] glenis hospitalist  6027 Thad Morris NP with [facility]S group returned call      Alissa Kanner  11/30/21 [phone]

## 2021-11-30 NOTE — ED PROVIDER NOTES
EMERGENCY DEPARTMENT ENCOUNTER      CHIEF COMPLAINT    Chief Complaint   Patient presents with    Chest Pain       [facility]    Verna Chew is a 49 y.o. female with history significant for recent joint replacement of the left hip, CAD status post stents 2 years ago who presents with chest pain. Patient was in the process of getting discharged from the outpatient surgery center after receiving a joint replacement yesterday, however started complaining about chest pressure sensation that is nonradiating did feel clammy and has some nausea but denies any vomiting or true diaphoresis, patient was not exertional given she just had hip replacement but no pleuritic component of this pain, no new rashes on her chest.  An EKG obtained at the outside facility apparently was concern for STEMI per the computer read therefore patient was sent to the emergency department for further evaluation. Patient currently symptom has completely resolved after getting aspirin nitroglycerin. REVIEW OF SYSTEMS    Constitutional: Denies chills, fatigue, unexpected weight loss or fever. HENT: Denies sore throat or rhinorrhea. Eyes: Denies vision changes. Respiratory: Denies shortness of breath or cough. Cardiovascular: Pain no palpitation leg swelling  Gastrointestinal: Denies abdominal pain, diarrhea, nausea and vomiting. Genitourinary: Denies dysuria or hematuria. Skin: Denies rashes or wounds. MSK: Denies joint pain. Neurologic: Denies headache, lightheadedness, numbness, or weakness.    Hematologic/lymphatic: Denies unexpected weight loss, night sweats  Endocrine: No polyuria, polydipsia, or polyphagia      Pertinent positives and negatives are delineated in HPI and ROS above, all other systems are reviewed and are negative    PAST MEDICAL HISTORY    Past Medical History:   Diagnosis Date    CAD (coronary artery disease)     MRSA infection      Medical history reviewed and no pertinent past medical history other than the ones mentioned above    SURGICAL HISTORY    Past Surgical History:   Procedure Laterality Date    BACK SURGERY      CHOLECYSTECTOMY  1992    CORONARY ANGIOPLASTY WITH STENT PLACEMENT      JOINT REPLACEMENT Right     SKIN BIOPSY      16 mrsa spot removals, head shoulder stomach back     Surgical history reviewed and no pertinent surgical history other than the ones mentioned above    CURRENT MEDICATIONS    Current Outpatient Rx   Medication Sig Dispense Refill    fluticasone-umeclidin-vilant (TRELEGY ELLIPTA) 100-62.5-25 MCG/INH AEPB Inhale 1 puff into the lungs daily 1 each 5    albuterol sulfate HFA (PROVENTIL HFA) 108 (90 Base) MCG/ACT inhaler Inhale 2 puffs into the lungs every 6 hours as needed for Wheezing 1 Inhaler 5    Spacer/Aero-Holding Chambers QUINTON 1 Device by Does not apply route daily as needed (use with MDI inhaler) 1 Device 0    metoprolol tartrate (LOPRESSOR) 25 MG tablet Take 0.5 tablets by mouth 2 times daily 90 tablet 3    aspirin 81 MG EC tablet Take 1 tablet by mouth daily for 90 doses 90 tablet 0     Medication is reviewed    ALLERGIES    No Known Allergies  Allergy is reviewed    FAMILY HISTORY    Family History   Problem Relation Age of Onset    Diabetes Father     Coronary Art Dis Father     Heart Disease Father     High Cholesterol Father     High Blood Pressure Father     Arthritis Sister     Asthma Sister      Family history reviewed and no pertinent family history other than the ones mentioned above    SOCIAL HISTORY    Social History     Socioeconomic History    Marital status: Legally      Spouse name: Smith Hobson    Number of children: 1    Years of education: 15    Highest education level: Not on file   Occupational History    Not on file   Tobacco Use    Smoking status: Current Every Day Smoker     Packs/day: 0.50     Types: Cigarettes    Smokeless tobacco: Never Used    Tobacco comment: cut back 3 months ago to 1/2 pack from 2 packs a day Vaping Use    Vaping Use: Never used   Substance and Sexual Activity    Alcohol use: Not Currently     Comment: occasional    Drug use: Yes     Frequency: 2.0 times per week     Types: Marijuana Champ Cue)     Comment: none since heart attack     Sexual activity: Not Currently   Other Topics Concern    Not on file   Social History Narrative    Not on file     Social Determinants of Health     Financial Resource Strain:     Difficulty of Paying Living Expenses: Not on file   Food Insecurity:     Worried About Running Out of Food in the Last Year: Not on file    Elaine of Food in the Last Year: Not on file   Transportation Needs:     Lack of Transportation (Medical): Not on file    Lack of Transportation (Non-Medical):  Not on file   Physical Activity:     Days of Exercise per Week: Not on file    Minutes of Exercise per Session: Not on file   Stress:     Feeling of Stress : Not on file   Social Connections:     Frequency of Communication with Friends and Family: Not on file    Frequency of Social Gatherings with Friends and Family: Not on file    Attends Confucianist Services: Not on file    Active Member of [address] [place] [address] or Organizations: Not on file    Attends Club or Organization Meetings: Not on file    Marital Status: Not on file   Intimate Partner Violence:     Fear of Current or Ex-Partner: Not on file    Emotionally Abused: Not on file    Physically Abused: Not on file    Sexually Abused: Not on file   Housing Stability:     Unable to Pay for Housing in the Last Year: Not on file    Number of [place] in the Last Year: Not on file    Unstable Housing in the Last Year: Not on file     Live with self  Alcohol and recreational drug use: no  Social history reviewed and no pertinent social history other than the ones mentioned above    PHYSICAL EXAM    Vital Signs:/82   Pulse 74   Temp 98.2 °F (36.8 °C) (Oral)   Resp 18   Ht 5' 9\" (1.753 m)   Wt 280 lb (127 kg)   LMP 06/13/2018 & Medical Decision Making:  Verna Chew is a 49 y.o. female who presents with chest pain. Given patient recently postop we did obtain a CT PE that I do not demonstrate any PE, and no pneumothorax or pneumonia no atelectasis to explain the chest pain, patient does have a heart score of 5, given his significant history of previous coronary artery disease requiring stents, patient will require provocative cardiac work-up. I did talk with Dr. Benny Couch regarding patient's hip he stated that patient is fully ambulatory now is able to bear full weight, no acute orthopedic needs. Will admit patient to hospitalist.    ED Course as of 11/30/21 1706   Tue Nov 30, 2021   1507 EKG demonstrated sinus arrhythmia, there is some flattening T waves in 3 and aVF and there is right bundle branch block otherwise unremarkable. The right bundle branch block is unchanged compared to before [YQ]      ED Course User Index  [YQ] New Gonzalez MD        DDx includes but not limited to:  ACS, PE, Dissection, PNA, PTX, Esophageal perf, GI, MSK, pericarditis, myocarditis      Workup includes but not limited to: EKG, CXR, CTPE, Labs    Treatment includes but not limited to: ASA, nitroglycerin prior to arrival    Critical care time: NA    Impression:   1. Chest pain    Disposition: Admit    This note dictated using Dragon medical voice recognition software. Attempts at proofreading were made, but errors may occasionally still occur.            New Gonzalez MD  11/30/21 [id number]

## 2021-12-01 LAB
ALBUMIN SERPL-MCNC: 3.5 GM/DL (ref 3.4–5)
ALBUMIN SERPL-MCNC: 3.5 GM/DL (ref 3.4–5)
ALP BLD-CCNC: 64 IU/L (ref 40–128)
ALT SERPL-CCNC: 17 U/L (ref 10–40)
ANION GAP SERPL CALCULATED.3IONS-SCNC: 13 MMOL/L (ref 4–16)
ANION GAP SERPL CALCULATED.3IONS-SCNC: 9 MMOL/L (ref 4–16)
AST SERPL-CCNC: 18 IU/L (ref 15–37)
BILIRUB SERPL-MCNC: 0.2 MG/DL (ref 0–1)
BUN BLDV-MCNC: 16 MG/DL (ref 6–23)
BUN BLDV-MCNC: 17 MG/DL (ref 6–23)
CALCIUM SERPL-MCNC: 8.2 MG/DL (ref 8.3–10.6)
CALCIUM SERPL-MCNC: 8.5 MG/DL (ref 8.3–10.6)
CHLORIDE BLD-SCNC: 105 MMOL/L (ref 99–110)
CHLORIDE BLD-SCNC: 105 MMOL/L (ref 99–110)
CHOLESTEROL: 147 MG/DL
CO2: 22 MMOL/L (ref 21–32)
CO2: 25 MMOL/L (ref 21–32)
CREAT SERPL-MCNC: 0.6 MG/DL (ref 0.6–1.1)
CREAT SERPL-MCNC: 0.7 MG/DL (ref 0.6–1.1)
FERRITIN: 14 NG/ML (ref 15–150)
GFR AFRICAN AMERICAN: >60 ML/MIN/1.73M2
GFR AFRICAN AMERICAN: >60 ML/MIN/1.73M2
GFR NON-AFRICAN AMERICAN: >60 ML/MIN/1.73M2
GFR NON-AFRICAN AMERICAN: >60 ML/MIN/1.73M2
GLUCOSE BLD-MCNC: 102 MG/DL (ref 70–99)
GLUCOSE BLD-MCNC: 99 MG/DL (ref 70–99)
HCT VFR BLD CALC: 25.8 % (ref 37–47)
HDLC SERPL-MCNC: 43 MG/DL
HEMOGLOBIN: 7.9 GM/DL (ref 12.5–16)
IRON: 23 UG/DL (ref 37–145)
LDL CHOLESTEROL DIRECT: 91 MG/DL
LV EF: 50 %
LVEF MODALITY: NORMAL
MAGNESIUM: 1.8 MG/DL (ref 1.8–2.4)
MCH RBC QN AUTO: 26.8 PG (ref 27–31)
MCHC RBC AUTO-ENTMCNC: 30.6 % (ref 32–36)
MCV RBC AUTO: 87.5 FL (ref 78–100)
PCT TRANSFERRIN: 9 % (ref 10–44)
PDW BLD-RTO: 16.3 % (ref 11.7–14.9)
PHOSPHORUS: 2.9 MG/DL (ref 2.5–4.9)
PLATELET # BLD: 299 K/CU MM (ref 140–440)
PMV BLD AUTO: 8.9 FL (ref 7.5–11.1)
POTASSIUM SERPL-SCNC: 4.2 MMOL/L (ref 3.5–5.1)
POTASSIUM SERPL-SCNC: 4.3 MMOL/L (ref 3.5–5.1)
RBC # BLD: 2.95 M/CU MM (ref 4.2–5.4)
RETICULOCYTE COUNT PCT: 1.7 % (ref 0.2–2.2)
SODIUM BLD-SCNC: 139 MMOL/L (ref 135–145)
SODIUM BLD-SCNC: 140 MMOL/L (ref 135–145)
TOTAL IRON BINDING CAPACITY: 260 UG/DL (ref 250–450)
TOTAL PROTEIN: 5.3 GM/DL (ref 6.4–8.2)
TRIGL SERPL-MCNC: 136 MG/DL
UNSATURATED IRON BINDING CAPACITY: 237 UG/DL (ref 110–370)
WBC # BLD: 8 K/CU MM (ref 4–10.5)

## 2021-12-01 PROCEDURE — 6360000002 HC RX W HCPCS: Performed by: NURSE PRACTITIONER

## 2021-12-01 PROCEDURE — 83550 IRON BINDING TEST: CPT

## 2021-12-01 PROCEDURE — 1200000000 HC SEMI PRIVATE

## 2021-12-01 PROCEDURE — 82728 ASSAY OF FERRITIN: CPT

## 2021-12-01 PROCEDURE — 93005 ELECTROCARDIOGRAM TRACING: CPT | Performed by: NURSE PRACTITIONER

## 2021-12-01 PROCEDURE — 94761 N-INVAS EAR/PLS OXIMETRY MLT: CPT

## 2021-12-01 PROCEDURE — 99222 1ST HOSP IP/OBS MODERATE 55: CPT | Performed by: INTERNAL MEDICINE

## 2021-12-01 PROCEDURE — 82746 ASSAY OF FOLIC ACID SERUM: CPT

## 2021-12-01 PROCEDURE — 6370000000 HC RX 637 (ALT 250 FOR IP): Performed by: NURSE PRACTITIONER

## 2021-12-01 PROCEDURE — 36415 COLL VENOUS BLD VENIPUNCTURE: CPT

## 2021-12-01 PROCEDURE — 83540 ASSAY OF IRON: CPT

## 2021-12-01 PROCEDURE — 85045 AUTOMATED RETICULOCYTE COUNT: CPT

## 2021-12-01 PROCEDURE — 80061 LIPID PANEL: CPT

## 2021-12-01 PROCEDURE — 85027 COMPLETE CBC AUTOMATED: CPT

## 2021-12-01 PROCEDURE — 80053 COMPREHEN METABOLIC PANEL: CPT

## 2021-12-01 PROCEDURE — 80069 RENAL FUNCTION PANEL: CPT

## 2021-12-01 PROCEDURE — 2580000003 HC RX 258: Performed by: NURSE PRACTITIONER

## 2021-12-01 PROCEDURE — 83735 ASSAY OF MAGNESIUM: CPT

## 2021-12-01 PROCEDURE — 83721 ASSAY OF BLOOD LIPOPROTEIN: CPT

## 2021-12-01 PROCEDURE — 93306 TTE W/DOPPLER COMPLETE: CPT

## 2021-12-01 PROCEDURE — 82607 VITAMIN B-12: CPT

## 2021-12-01 RX ORDER — BUDESONIDE AND FORMOTEROL FUMARATE DIHYDRATE 160; 4.5 UG/1; UG/1
2 AEROSOL RESPIRATORY (INHALATION) 2 TIMES DAILY PRN
Status: DISCONTINUED | OUTPATIENT
Start: 2021-12-01 | End: 2021-12-05 | Stop reason: HOSPADM

## 2021-12-01 RX ADMIN — SODIUM CHLORIDE, PRESERVATIVE FREE 10 ML: 5 INJECTION INTRAVENOUS at 20:52

## 2021-12-01 RX ADMIN — OXYCODONE AND ACETAMINOPHEN 1 TABLET: 5; 325 TABLET ORAL at 05:45

## 2021-12-01 RX ADMIN — METOPROLOL TARTRATE 12.5 MG: 25 TABLET, FILM COATED ORAL at 20:47

## 2021-12-01 RX ADMIN — ATORVASTATIN CALCIUM 40 MG: 40 TABLET, FILM COATED ORAL at 20:47

## 2021-12-01 RX ADMIN — METOPROLOL TARTRATE 12.5 MG: 25 TABLET, FILM COATED ORAL at 09:55

## 2021-12-01 RX ADMIN — FAMOTIDINE 20 MG: 20 TABLET ORAL at 09:55

## 2021-12-01 RX ADMIN — ENOXAPARIN SODIUM 40 MG: 100 INJECTION SUBCUTANEOUS at 09:54

## 2021-12-01 RX ADMIN — SENNOSIDES AND DOCUSATE SODIUM 1 TABLET: 50; 8.6 TABLET ORAL at 20:47

## 2021-12-01 RX ADMIN — SENNOSIDES AND DOCUSATE SODIUM 1 TABLET: 50; 8.6 TABLET ORAL at 09:55

## 2021-12-01 RX ADMIN — SODIUM CHLORIDE, PRESERVATIVE FREE 25 ML: 5 INJECTION INTRAVENOUS at 09:57

## 2021-12-01 RX ADMIN — OXYCODONE AND ACETAMINOPHEN 1 TABLET: 5; 325 TABLET ORAL at 13:07

## 2021-12-01 RX ADMIN — OXYCODONE AND ACETAMINOPHEN 1 TABLET: 5; 325 TABLET ORAL at 20:47

## 2021-12-01 RX ADMIN — ASPIRIN 81 MG: 81 TABLET, CHEWABLE ORAL at 09:55

## 2021-12-01 RX ADMIN — FAMOTIDINE 20 MG: 20 TABLET ORAL at 20:47

## 2021-12-01 ASSESSMENT — PAIN SCALES - GENERAL
PAINLEVEL_OUTOF10: 8
PAINLEVEL_OUTOF10: 6
PAINLEVEL_OUTOF10: 10
PAINLEVEL_OUTOF10: 7
PAINLEVEL_OUTOF10: 10

## 2021-12-01 NOTE — PROGRESS NOTES
Skin assessment performed with two RNs, TAMIR Kevin assisting. Surgical incision noted on right hip, no other areas of concern noted. Will continue to monitor.

## 2021-12-01 NOTE — CONSULTS
INPATIENT CARDIOLOGY CONSULT NOTE       Reason for consultation:  Chest tightness    Referring physician:  Irvin Urias MD     Primary care physician: Esmeralda Epley, MD      Dear Irvin Urias MD Thank you for the consult    Chief Complaint   Patient presents with    Chest Pain       History of present illness:Joao is a 49 y. o.year old who  presents with  Chief Complaint   Patient presents with    Chest Pain       Patient is a 79-year-old female with prior history of CAD/STEMI status post PCI -does not follow routinely with cardiology, presents with chest pain    Patient had recent hip replacement and after surgery developed chest tightness. Chest pain retrosternal 5/10 nonexertional nonradiating intermittent self-limiting. Patient also endorsed some jaw pain and clammy skin. Patient was sent to the ER for evaluation and subsequently admitted. Patient currently denies any active chest pain     Troponin negative  EKG shows sinus rhythm without any ischemic changes    Echo shows preserved EF    [facility] 6/19     1. PCI to distal LAD and Hilliard for STEMI with 100 % occlusion of   distal LAD with no reflow after PCI using 3.0 X 18 and 2.5 X 23   [place]   2. Penumbra atherectomy for no flow , eventually we got LE III   flow for distal LAD   3. LV gram shows Akinetic/ dyskinetic Hilliard with Hyper   contractile base   4. Circ and RCA are patent      Recommendations   DAPT for 1 year   Aggrastat for 12 hrs   Risk modification         Past medical history:    has a past medical history of CAD (coronary artery disease) and MRSA infection. Past surgical history:   has a past surgical history that includes Cholecystectomy (1992); skin biopsy; Coronary angioplasty with stent; back surgery; and joint replacement (Right). Social History:   reports that she has been smoking cigarettes. She has been smoking about 0.50 packs per day. She has never used smokeless tobacco. She reports previous alcohol use.  She reports current drug use. Frequency: 2.00 times per week. Drug: Marijuana Ellyn Glover).   Family history:   no family history of CAD, STROKE of DM    No Known Allergies    sodium chloride flush 0.9 % injection 5-40 mL, 2 times per day  sodium chloride flush 0.9 % injection 5-40 mL, PRN  0.9 % sodium chloride infusion, PRN  ondansetron (ZOFRAN-ODT) disintegrating tablet 4 mg, Q8H PRN   Or  ondansetron (ZOFRAN) injection 4 mg, Q6H PRN  acetaminophen (TYLENOL) tablet 650 mg, Q6H PRN   Or  acetaminophen (TYLENOL) suppository 650 mg, Q6H PRN  polyethylene glycol (GLYCOLAX) packet 17 g, Daily PRN  aspirin chewable tablet 81 mg, Daily  atorvastatin (LIPITOR) tablet 40 mg, Nightly  sennosides-docusate sodium (SENOKOT-S) 8.6-50 MG tablet 1 tablet, BID  fleet rectal enema 1 enema, Daily PRN  nicotine (NICODERM CQ) 21 MG/24HR 1 patch, Daily  enoxaparin (LOVENOX) injection 40 mg, Daily  nitroGLYCERIN (NITROSTAT) SL tablet 0.4 mg, Q5 Min PRN  albuterol sulfate  (90 Base) MCG/ACT inhaler 2 puff, Q6H PRN  metoprolol tartrate (LOPRESSOR) tablet 12.5 mg, BID  budesonide-formoterol (SYMBICORT) 160-4.5 MCG/ACT inhaler 2 puff, BID  famotidine (PEPCID) tablet 20 mg, BID  oxyCODONE-acetaminophen (PERCOCET) 5-325 MG per tablet 1 tablet, Q6H PRN      Current Facility-Administered Medications   Medication Dose Route Frequency Provider Last Rate Last Admin    sodium chloride flush 0.9 % injection 5-40 mL  5-40 mL IntraVENous 2 times per day HUMZA Molina CNP   25 mL at 12/01/21 0957    sodium chloride flush 0.9 % injection 5-40 mL  5-40 mL IntraVENous PRN HUMZA De La Torre CNP        0.9 % sodium chloride infusion  25 mL IntraVENous PRN HUMZA De La Torre CNP        ondansetron (ZOFRAN-ODT) disintegrating tablet 4 mg  4 mg Oral Q8H PRN HUMZA De La Torre CNP        Or    ondansetron (ZOFRAN) injection 4 mg  4 mg IntraVENous Q6H PRN HUMZA De La Torre CNP        acetaminophen (TYLENOL) tablet 650 mg 650 mg Oral Q6H PRN HUMZA Fonseca CNP        Or    acetaminophen (TYLENOL) suppository 650 mg  650 mg Rectal Q6H PRN HUMZA De La Torre CNP        polyethylene glycol (GLYCOLAX) packet 17 g  17 g Oral Daily PRN HUMZA Fonseca CNP        aspirin chewable tablet 81 mg  81 mg Oral Daily HUZMA Fonseca CNP   81 mg at 12/01/21 0955    atorvastatin (LIPITOR) tablet 40 mg  40 mg Oral Nightly HUMZA Fonseca CNP   40 mg at 11/30/21 2026    sennosides-docusate sodium (SENOKOT-S) 8.6-50 MG tablet 1 tablet  1 tablet Oral BID HUMZA Fonseca CNP   1 tablet at 12/01/21 0955    fleet rectal enema 1 enema  1 enema Rectal Daily PRN HUMZA De La Torre CNP        nicotine (NICODERM CQ) 21 MG/24HR 1 patch  1 patch TransDERmal Daily HUMZA De La Torre CNP        enoxaparin (LOVENOX) injection 40 mg  40 mg SubCUTAneous Daily HUMZA De La Torre CNP   40 mg at 12/01/21 0954    nitroGLYCERIN (NITROSTAT) SL tablet 0.4 mg  0.4 mg SubLINGual Q5 Min PRN HUMZA De La Torre CNP        albuterol sulfate  (90 Base) MCG/ACT inhaler 2 puff  2 puff Inhalation Q6H PRN HUMZA De La Torre CNP        metoprolol tartrate (LOPRESSOR) tablet 12.5 mg  12.5 mg Oral BID HUMZA De La Torre CNP   12.5 mg at 12/01/21 0955    budesonide-formoterol (SYMBICORT) 160-4.5 MCG/ACT inhaler 2 puff  2 puff Inhalation BID HUMZA De La Torre CNP        famotidine (PEPCID) tablet 20 mg  20 mg Oral BID HUMZA De La Torre CNP   20 mg at 12/01/21 0955    oxyCODONE-acetaminophen (PERCOCET) 5-325 MG per tablet 1 tablet  1 tablet Oral Q6H PRN HUMZA Nichols - CNP   1 tablet at 12/01/21 1307         Review of Systems:     · Constitutional: No Fever or Weight Loss   · Eyes: No Decreased Vision  · ENT: No Headaches, Hearing Loss or Vertigo  · Cardiovascular: No chest pain, dyspnea on exertion, palpitations or loss of consciousness  · Respiratory: No cough or wheezing    · Gastrointestinal: No abdominal pain, appetite loss, blood in stools, constipation, diarrhea or heartburn  · Genitourinary: No dysuria, trouble voiding, or hematuria  · Musculoskeletal:  No gait disturbance, weakness or joint complaints  · Integumentary: No rash or pruritis  · Neurological: No TIA or stroke symptoms  · Psychiatric: No anxiety or depression  · Endocrine: No malaise, fatigue or temperature intolerance  · Hematologic/Lymphatic: No bleeding problems, blood clots or swollen lymph nodes  · Allergic/Immunologic: No nasal congestion or hives    All other systems were reviewed and were negative otherwise. Physical Examination:      Vitals:    12/01/21 0953   BP: (!) 148/83   Pulse: 86   Resp: 16   Temp: 98.2 °F (36.8 °C)   SpO2: 96%      Wt Readings from Last 3 Encounters:   11/30/21 280 lb (127 kg)   11/12/20 260 lb (117.9 kg)   10/06/20 260 lb (117.9 kg)     Body mass index is 41.35 kg/m². General Appearance:  No distress, conversant  Constitutional:  Well developed, Well nourished  HEENT:  Normocephalic, Atraumatic, Oropharynx moist, No oral exudates,   Nose normal. Neck Supple Carotid: no carotid bruit  Eyes:  Conjunctiva normal, No discharge. Respiratory:    Normal breath sounds, No respiratory distress, No wheezing, no use of accessory muscles, diaphragm movement is normal  No chest Tenderness  Cardiovascular: S1-S2 No murmurs auscultated. No rubs, thrills or gallops. Normal  rhythm. Pedal pulses are normal. No pedal edema  GI:  Soft Non tender, non distended. :  No CVA tenderness. Musculoskeletal:   No tenderness, No cyanosis, No clubbing. Integument:  Warm, Dry, No erythema, No rash. Lymphatic:  No lymphadenopathy noted. Neurologic:  Alert & oriented x 3  No focal deficits noted.    Psychiatric:  Affect normal, Judgment normal, Mood normal.       Lab Review     Recent Labs     12/01/21  0959   WBC 8. 0   HGB 7.9*   HCT 25.8*         Recent Labs     12/01/21  0959     139   K 4.3  4.2     105   CO2 22  25   PHOS 2.9   BUN 16  17   CREATININE 0.7  0.6     Recent Labs     12/01/21  0959   AST 18   ALT 17   BILITOT 0.2   ALKPHOS 64     No results for input(s): TROPONINI in the last 72 hours. No results found for: BNP  Lab Results   Component Value Date    INR 0.90 11/30/2021    PROTIME 11.6 (L) 11/30/2021         All labs, images, EKGs were personally reviewed      Assessment: 49 y. o.year old with PMH of  has a past medical history of CAD (coronary artery disease) and MRSA infection. Recommendations:      1. History of coronary disease STEMI s/p PCI  2. Chest pain   3. Status post right hip replacement 11/29/2021  4. Recent acute blood loss anemia    Plan for stress and echo tomorrow  Continue with medical management including aspirin statins beta-blocker.   Patient counseled again smoking  Need to closely follow-up with cardiology as noted     Thank you for the consult    Dr. Liseth Forbes  12/1/2021 3:05 PM

## 2021-12-01 NOTE — ED NOTES
Pt was able to get up to the bedside commode without difficulty, denies pain, is on telemetry and has not distress. Pt denies requests. report to Marialuisa Funes.       Sydnee Gongora RN  11/30/21 6830

## 2021-12-01 NOTE — PROGRESS NOTES
ATTENDING PHYSICIAN'S PROGRESS NOTES    Patient:  Netta Grande      Unit/Bed:1111/1111-A    YOB: 1970    MRN: [id number]     Acct: [de-identified]     Admit date: 11/30/2021    Patient Seen, Chart, Consults notes, Labs, Radiology studies reviewed. SUBJECTIVE:   Day 1 of stay with chest pain and associated shortness of breath, with CTA of the chest negative for PE and normal troponins; and most recent (in last 24 hours) has had resolution of the chest pain. Patient has been seen by cardiology consult and scheduled for 2D echocardiogram and stress test tomorrow. Overnight has been uneventful. Of note is patient's hip replacement 2 days ago and requiring PT/OT. All other ROS negative except noted in HPI    Past, Family, Social History unchanged from admission. Diet:  ADULT DIET;  Regular; Low Fat/Low Chol/High Fiber/TIM; No Added Salt (3-4 gm)    Medications:  Scheduled Meds:   sodium chloride flush  5-40 mL IntraVENous 2 times per day    aspirin  81 mg Oral Daily    atorvastatin  40 mg Oral Nightly    sennosides-docusate sodium  1 tablet Oral BID    nicotine  1 patch TransDERmal Daily    enoxaparin  40 mg SubCUTAneous Daily    metoprolol tartrate  12.5 mg Oral BID    budesonide-formoterol  2 puff Inhalation BID    famotidine  20 mg Oral BID     Continuous Infusions:   sodium chloride       PRN Meds:sodium chloride flush, sodium chloride, ondansetron **OR** ondansetron, acetaminophen **OR** acetaminophen, polyethylene glycol, fleet, nitroGLYCERIN, albuterol sulfate HFA, oxyCODONE-acetaminophen    OBJECTIVE:    CBC:   Recent Labs     11/30/21  1306 12/01/21  0959   WBC 10.3 8.0   HGB 8.5* 7.9*    299     BMP:    Recent Labs     11/30/21  1306 12/01/21  0959    140  139   K 4.2 4.3  4.2    105  105   CO2 22 22  25   BUN 19 16  17   CREATININE 0.9 0.7  0.6   GLUCOSE 120* 102*  99     Calcium:  Recent Labs     12/01/21  0959   CALCIUM 8.5  8.2*     Ionized Calcium:No results for input(s): IONCA in the last 72 hours. Magnesium:  Recent Labs     12/01/21  0959   MG 1.8     Phosphorus:  Recent Labs     12/01/21  0959   PHOS 2.9     BNP:No results for input(s): BNP in the last 72 hours. Glucose:No results for input(s): POCGLU in the last 72 hours. HgbA1C: No results for input(s): LABA1C in the last 72 hours. INR:   Recent Labs     11/30/21  1306   INR 0.90     Hepatic:   Recent Labs     12/01/21  0959   ALKPHOS 64   ALT 17   AST 18   PROT 5.3*   BILITOT 0.2   LABALBU 3.5  3.5     Amylase and Lipase:No results for input(s): LACTA, AMYLASE in the last 72 hours. Lactic Acid: No results for input(s): LACTA in the last 72 hours. Troponin:   Recent Labs     11/30/21  1306 11/30/21  2211 11/30/21  2237   TROPONINT <0.010 <0.010 <0.010     BNP: No results for input(s): BNP in the last 72 hours. Lipids:   Recent Labs     12/01/21  0959   CHOL 147   TRIG 136   HDL 43     ABGs: No results found for: PH, PCO2, PO2, HCO3, O2SAT    Radiology reports as per the Radiologist  Radiology: Echocardiogram complete 2D with doppler with color    Result Date: 12/1/2021  Transthoracic Echocardiography Report (TTE)  Demographics   Patient Name       Ary Lockwood      Date of Study       12/01/2021   Date of Birth      1970         Gender              Female   Age                49 year(s)         Race                   Patient Number     [id number]         Room Number         5753   Visit Number       [id number]   Corporate ID       [id number]   Accession Number   [id number]         Keith Hills, TAMIR   Ordering Physician Shen Narvaez                     CNP                Physician           MD  Procedure Type of Study   TTE procedure:ECHOCARDIOGRAM COMPLETE 2D W DOPPLER W COLOR.   Procedure Date Date: 12/01/2021 Start: 08:10 AM Study Location: Portable Indications:Chest pain. Patient Status: Routine Height: 69 inches Weight: 280 pounds BSA: 2.38 m2 BMI: 41.35 kg/m2 HR: 82 bpm BP: 103/69 mmHg  Conclusions   Summary  Left ventricular systolic function is normal.  Ejection fraction is visually estimated at 50%. Grade I diastolic dysfunction. No regional wall motion abnormalites. Right heart is moderately dilated (chronic). Moderate tricuspid regurgitation is present. RVSP is 53 mmHg. No evidence of any pericardial effusion. Signature   ------------------------------------------------------------------  Electronically signed by Simona Barnes MD (Interpreting  physician) on 12/01/2021 at 11:58 AM  ------------------------------------------------------------------   Findings   Left Ventricle  Left ventricular systolic function is normal.  Ejection fraction is visually estimated at 50%. Grade I diastolic dysfunction. No regional wall motion abnormalites. Left Atrium  Essentially normal left atrium. Right Atrium  Moderately dilated right atrium. Right Ventricle  Moderately dilated right ventricle. Aortic Valve  Structurally normal aortic valve. Mitral Valve  Trace mitral regurgitation is present. Tricuspid Valve  Moderate tricuspid regurgitation is present. RVSP is 53 mmHg. Pulmonic Valve  Pulmonic valve is structurally normal.   Pericardial Effusion  No evidence of any pericardial effusion.   M-Mode/2D Measurements & Calculations   LV Diastolic Dimension:   LV Systolic Dimension:   LA Dimension: 4.5 cmAO  4.49 cm                   3.42 cm                  Root Dimension: 3.1 cm  LV FS:23.8 %              LV Volume Diastolic:  LV PW Diastolic: 1 cm     89.9 ml  LV PW Systolic: 6.42 cm   LV Volume Systolic: 40  Septum Diastolic: 7.07 cm ml                       RV Diastolic Dimension:  Septum Systolic: 1.3 cm   LV EDV/LV EDV Index:     4.42 cm  CO: 7.08 l/min            90.5 ml/38 m2LV ESV/LV  CI: 2.97 l/m*m2           ESV Index: 40 ml/17 m2   LA/Aorta: 1.45                            EF Calculated (A4C):                            55.8 %                            EF Calculated (2D): 47.7                            %                             LVOT: 2.4 cm  Doppler Measurements & Calculations   MV Peak E-Wave: 91.3 AV Peak Velocity: 156 cm/s    LVOT Peak Velocity: 90.2  cm/s                 AV Peak Gradient: 9.73 mmHg   cm/s  MV Peak A-Wave: 120  AV Mean Velocity: 111 cm/s    LVOT Mean Velocity: 61.8  cm/s                 AV Mean Gradient: 6 mmHg      cm/s  MV E/A Ratio: 0.76   AV VTI: 27.6 cm               LVOT Peak Gradient: 3  MV Peak Gradient:    AV Area (Continuity):3.13 cm2 mmHgLVOT Mean Gradient: 2  3.33 mmHg                                          mmHg                       LVOT VTI: 19.1 cm             Estimated RVSP: 53 mmHg  MV P1/2t: 55 msec                                  Estimated RAP:3 mmHg  MVA by PHT:4 cm2     Estimated PASP: 52.56 mmHg                                                      TR Velocity:352 cm/s  MV E' Lateral                                      TR Gradient:49.56 mmHg  Velocity: 10.7 cm/s   MV E/E' lateral:  8.53      XR CHEST PORTABLE    Result Date: 11/30/2021  EXAMINATION: ONE XRAY VIEW OF THE CHEST 11/30/2021 3:30 pm COMPARISON: Chest 06/28/2020 HISTORY: ORDERING SYSTEM PROVIDED HISTORY: Chest pain Acuity: Acute Type of Exam: Initial FINDINGS: The cardiac silhouette is enlarged. Small to moderate size hiatal hernia evident. The mediastinal and hilar silhouettes appear otherwise unremarkable. The lungs appear clear. No pleural effusion. No pneumothorax is seen. No acute osseous abnormality is identified. 1. No radiographic evidence of acute pulmonary disease. 2. Cardiomegaly. 3. Small to moderate size hiatal hernia.      CTA PULMONARY W CONTRAST    Result Date: 11/30/2021  EXAMINATION: CTA OF THE CHEST 11/30/2021 5:06 pm TECHNIQUE: CTA of the chest was performed after the administration of intravenous contrast.  Multiplanar reformatted images are provided for review. MIP images are provided for review. Dose modulation, iterative reconstruction, and/or weight based adjustment of the mA/kV was utilized to reduce the radiation dose to as low as reasonably achievable. 90 ml isovue 370 used COMPARISON: CTA chest 06/29/2020 HISTORY: ORDERING SYSTEM PROVIDED HISTORY: SOB. Rt. hip replacement yesterday. Decision Support Exception - unselect if not a suspected or confirmed emergency medical condition->Emergency Medical Condition (MA) Reason for Exam: SOB. Rt. hip replacement yesterday. Acuity: Acute Type of Exam: Initial Additional signs and symptoms: no Relevant Medical/Surgical History: FINDINGS: Pulmonary Arteries: Pulmonary arteries are adequately opacified for evaluation. No evidence of intraluminal filling defect to suggest pulmonary embolism. Main pulmonary artery is 38 mm diameter. Mediastinum: No evidence of mediastinal lymphadenopathy. Small hiatal hernia. The heart is mildly enlarged. The pericardium demonstrates no acute abnormality. There is no acute abnormality of the thoracic aorta. The ascending thoracic aorta is 30 mm and descending thoracic aorta 27 mm. Lungs/pleura: The lungs are without acute process. Focal linear band of subsegmental atelectasis lateral inferior lingula left upper lobe. Subtle areas of air trapping predominate mid and upper lungs typical of chronic smoking sequela (chronic bronchiolitis). No other focal consolidation or pulmonary edema. No evidence of pleural effusion or pneumothorax. Upper Abdomen: Hepatic steatosis. Status post cholecystectomy. Limited images of the upper abdomen are otherwise unremarkable. Soft Tissues/Bones: No acute bone or soft tissue abnormality. 1. No acute pulmonary embolism. 2. No acute pulmonary disease. 3. Dilated main pulmonary artery can be seen with pulmonary hypertension but is nonspecific.  4. Mild chronic pulmonary changes typical bronchiolitis related to smoking, possible early COPD. Correlate with pulmonary function tests. 5. Cardiomegaly. 6. Small hiatal hernia. 7. Hepatic steatosis. Physical Exam:  Vitals: /63   Pulse 89   Temp 98.6 °F (37 °C) (Oral)   Resp 16   Ht 5' 9\" (1.753 m)   Wt 280 lb (127 kg)   LMP 06/13/2018   SpO2 98%   BMI 41.35 kg/m²   24 hour intake/output:No intake or output data in the 24 hours ending 12/01/21 1715  Last 3 weights: Wt Readings from Last 3 Encounters:   11/30/21 280 lb (127 kg)   11/12/20 260 lb (117.9 kg)   10/06/20 260 lb (117.9 kg)       General appearance - alert, well appearing, and in no distress, oriented to person, place, and time and in mild to moderate distress  HEENT: Normocephalic, Atraumatic, Conjuctiva pink, PERRL, Oral mucosa normal, Lips, teeth and gums normal, Trachea midline, Thyroid normal and No noted lymphadenopathy  Chest - clear to auscultation, no wheezes, rales or rhonchi, symmetric air entry  Cardiovascular - normal rate, regular rhythm, normal S1, S2, no murmurs, rubs, clicks or gallops  Abdomen - soft, nontender, nondistended, no masses or organomegaly   Neurological - Alert and oriented, Normal speech, No focal findings or movement disorder noted and Motor and sensory grossly normal bilaterally  Integumentary - Skin color, texture, turgor normal. No Rashes or lesions  Musculoskeletal -Full ROM times 4 extremities, No clubbing or cyanosis and No peripheral edema      DVT prophylaxis: [x] Lovenox                                 [] SCDs                                 [] SQ Heparin                                 [] Encourage ambulation           [] Already on Anticoagulation                 ASSESSMENT / PLAN :    Principal Problem:    Chest pain  Currently chest pain-free, patient seen by cardiology consult with recommendation for stress test and 2D echocardiogram.  We will keep patient n.p.o. after midnight for the procedures.     Active Problems: Coronary artery disease involving native coronary artery of native heart with angina pectoris [facility])  Patient had a recent STEMI with PCI, and presenting with chest pain. She admits to being compliant with her medication regimen. Input by cardiology consult noted and appreciated. Mixed hyperlipidemia  Continue with statin as ordered, follow-up on lipid panel. Primary osteoarthritis of right hip  Status post total hip arthroplasty by Dr. Pankaj Kim, orders placed for PT/OT. Optimize pain control. Resolved Problems:    * No resolved hospital problems. *    Management as outlined, patient will be maintained in-house overnight and will be reassessed by PT/OT. Also scheduled for 2D echocardiogram and stress test.  A.m. labs.     Electronically signed by Martha Alejo MD on 12/1/2021 at 5:15 PM    [address] [place] [address]

## 2021-12-01 NOTE — H&P
History and Physical      Name:  Caryle Dumas /Age/Sex: 1970  (49 y.o. female)   MRN & CSN:  [id number] & [id number] Admission Date/Time: 2021  3:01 PM   Location:  ED28/ED-28 PCP: Betty Clemente MD       Hospital Day: 1    Assessment and Plan:   Caryle Dumas is a 49 y.o.  female  who presents with chest pain straight hip replacement      #Chest pain r/o ACS  #Coronary artery disease    Initial troponin negative, trend x3              Telemetry monitoring    CXR               EKG reviewed, normal sinus rhythm with PVCs. No evidence of acute ST elevation or depression . Repeat EKG in AM              Cardiology consult. We will keep n.p.o. after midnight for possible testing in a.m. Antiplatelet/BB/Statin/Sl Nitro    Check lipid panel in am    -Echocardiogram ordered    #Status post right hip replacement   Done at [facility] 2021   Cleared  For ambulation per Dr. Gadiel Miles as tolerated   Had CT PE scan in ED which was negative for pulmonary embolism as she is a postop surgical patient associates high risk for thrombus      #Acute blood loss anemia   -H&H 8.5/28. 1. Baseline appears to be around 12/38. Likely postop secondary to recent hip surgery yesterday. Continue to trend daily. Chronic Conditions: continue home medication as ordered  BMI: Body mass index is 41.35 kg/m². Life style modifications  Tobacco dependence: Smokes 0.5 PPD  . Patient was counseled on tobacco cessation. Teen patch and lozenges    All testing  and results reviewed with patient . All questions answered. Patient and family voiced understanding    This patient was seen and examined in conjunction with   . He/She was agreeable with the plan and management as dictated above. Diet Diet NPO  ADULT DIET; Regular;  No Caffeine   DVT Prophylaxis [x] Lovenox, []  Heparin, [] SCDs, [] Ambulation  [] NOAC   GI Prophylaxis [x] PPI,  [] H2 Blocker,  [] Carafate,  [] Diet/Tube Feeds   Code Status Full Code   Disposition Patient requires continued admission due to evaluation of chest pain   MDM [] Low, [x] Moderate,[x]  High       History of Present Illness:     Chief Complaint: <principal problem not specified>  Cara Solis is a 49 y.o.  female  who presents with chest pain post right hip replacement. The patient was in the process of getting discharged from the outpatient surgery center after receiving a joint replacement yesterday and started complaining of chest pressure that was nonradiating. Also endorses jaw pain and clammy sensation and nausea. Pain was nonexertional and nonpleuritic. An EKG was obtained at the outside facility and there is some concern for a STEMI per the computer read therefore the patient was sent to the ED for further evaluation. Patient's pain resolved after receiving aspirin and nitroglycerin. I discussed this patient with the ED provider and Dr. Remi Poon. I did a review of patient's medical records, lab results and imaging conducted today. I personally reviewed patient's vital signs including pulse ox and EKG. Ten point ROS reviewed negative, unless as noted above    Objective:   No intake or output data in the 24 hours ending 11/30/21 1915     Labs:   Recent Labs     11/30/21  1306      K 4.2      CO2 22   BUN 19   CREATININE 0.9   WBC 10.3   HCT 28.1*          Imaging:  CTA PULMONARY W CONTRAST  Narrative: EXAMINATION:  CTA OF THE CHEST 11/30/2021 5:06 pm    TECHNIQUE:  CTA of the chest was performed after the administration of intravenous  contrast.  Multiplanar reformatted images are provided for review. MIP  images are provided for review. Dose modulation, iterative reconstruction,  and/or weight based adjustment of the mA/kV was utilized to reduce the  radiation dose to as low as reasonably achievable.  90 ml isovue 370 used    COMPARISON:  CTA chest 06/29/2020    HISTORY:  ORDERING SYSTEM PROVIDED HISTORY: SOB. Rt. hip replacement yesterday. Decision Support Exception - unselect if not a suspected or confirmed  emergency medical condition->Emergency Medical Condition (MA)  Reason for Exam: SOB. Rt. hip replacement yesterday. Acuity: Acute  Type of Exam: Initial  Additional signs and symptoms: no  Relevant Medical/Surgical History:    FINDINGS:  Pulmonary Arteries: Pulmonary arteries are adequately opacified for  evaluation. No evidence of intraluminal filling defect to suggest pulmonary  embolism. Main pulmonary artery is 38 mm diameter. Mediastinum: No evidence of mediastinal lymphadenopathy. Small hiatal  hernia. The heart is mildly enlarged. The pericardium demonstrates no acute  abnormality. There is no acute abnormality of the thoracic aorta. The  ascending thoracic aorta is 30 mm and descending thoracic aorta 27 mm. Lungs/pleura: The lungs are without acute process. Focal linear band of  subsegmental atelectasis lateral inferior lingula left upper lobe. Subtle  areas of air trapping predominate mid and upper lungs typical of chronic  smoking sequela (chronic bronchiolitis). No other focal consolidation or  pulmonary edema. No evidence of pleural effusion or pneumothorax. Upper Abdomen: Hepatic steatosis. Status post cholecystectomy. Limited  images of the upper abdomen are otherwise unremarkable. Soft Tissues/Bones: No acute bone or soft tissue abnormality. Impression: 1. No acute pulmonary embolism. 2. No acute pulmonary disease. 3. Dilated main pulmonary artery can be seen with pulmonary hypertension but  is nonspecific. 4. Mild chronic pulmonary changes typical bronchiolitis related to smoking,  possible early COPD. Correlate with pulmonary function tests. 5. Cardiomegaly. 6. Small hiatal hernia. 7. Hepatic steatosis.   XR CHEST PORTABLE  Narrative: EXAMINATION:  ONE XRAY VIEW OF THE CHEST    11/30/2021 3:30 pm    COMPARISON:  Chest 06/28/2020    HISTORY:  ORDERING SYSTEM PROVIDED HISTORY: Chest pain    Acuity: Acute  Type of Exam: Initial    FINDINGS:  The cardiac silhouette is enlarged. Small to moderate size hiatal hernia  evident. The mediastinal and hilar silhouettes appear otherwise  unremarkable. The lungs appear clear. No pleural effusion. No pneumothorax  is seen. No acute osseous abnormality is identified. Impression: 1. No radiographic evidence of acute pulmonary disease. 2. Cardiomegaly. 3. Small to moderate size hiatal hernia. Vitals:   Vitals:    11/30/21 1512   BP: 128/82   Pulse: 74   Resp: 18   Temp: 98.2 °F (36.8 °C)   SpO2: 100%     Physical Exam:   GEN Awake female, sitting upright in bed in no apparent distress. Appears given age. EYES Pupils are equally round. No scleral erythema, discharge, or conjunctivitis. HENT Mucous membranes are moist.  NECK Supple, no apparent thyromegaly or masses. RESP Clear to auscultation, no wheezes, rales or rhonchi. Respirations even and unlabored on RA. CARDIO/VASC   S1/S2 auscultated. Regular rate without appreciable murmurs, rubs, or gallops. Peripheral pulses equal bilaterally and palpable. No peripheral edema. GI Abdomen is soft without significant tenderness, masses, or guarding. Bowel sounds are normoactive.  No costovertebral angle tenderness. Rogers catheter is not present. MSK No gross joint deformities. SKIN Normal coloration, warm, dry. NEURO Cranial nerves appear grossly intact, normal speech, no lateralizing weakness. PSYCH Awake, alert, oriented x 4. Affect appropriate. Past Medical History:      Past Medical History:   Diagnosis Date    CAD (coronary artery disease)     MRSA infection      PSHX:  has a past surgical history that includes Cholecystectomy (1992); skin biopsy; Coronary angioplasty with stent; back surgery; and joint replacement (Right).     Allergies: No Known Allergies    FAM HX: family history includes Arthritis in her sister; Asthma in her sister; Coronary Art Dis in her father; Diabetes in her father; Heart Disease in her father; High Blood Pressure in her father; High Cholesterol in her father. Soc HX:   Social History     Socioeconomic History    Marital status: Legally      Spouse name: Negro Adame    Number of children: 1    Years of education: 15    Highest education level: None   Occupational History    None   Tobacco Use    Smoking status: Current Every Day Smoker     Packs/day: 0.50     Types: Cigarettes    Smokeless tobacco: Never Used    Tobacco comment: cut back 3 months ago to 1/2 pack from 2 packs a day   Vaping Use    Vaping Use: Never used   Substance and Sexual Activity    Alcohol use: Not Currently     Comment: occasional    Drug use: Yes     Frequency: 2.0 times per week     Types: Marijuana (Weed)     Comment: none since heart attack     Sexual activity: Not Currently   Other Topics Concern    None   Social History Narrative    None     Social Determinants of Health     Financial Resource Strain:     Difficulty of Paying Living Expenses: Not on file   Food Insecurity:     Worried About Running Out of Food in the Last Year: Not on file    Elaine of Food in the Last Year: Not on file   Transportation Needs:     Lack of Transportation (Medical): Not on file    Lack of Transportation (Non-Medical):  Not on file   Physical Activity:     Days of Exercise per Week: Not on file    Minutes of Exercise per Session: Not on file   Stress:     Feeling of Stress : Not on file   Social Connections:     Frequency of Communication with Friends and Family: Not on file    Frequency of Social Gatherings with Friends and Family: Not on file    Attends Religion Services: Not on file    Active Member of Clubs or Organizations: Not on file    Attends Club or Organization Meetings: Not on file    Marital Status: Not on file   Intimate Partner Violence:     Fear of Current or Ex-Partner: Not on file    Emotionally Abused: Not on file   Keshia Physically Abused: Not on file    Sexually Abused: Not on file   Housing Stability:     Unable to Pay for Housing in the Last Year: Not on file    Number of Places Lived in the Last Year: Not on file    Unstable Housing in the Last Year: Not on file       Medications:   Medications:    sodium chloride flush  5-40 mL IntraVENous 2 times per day    [START ON 12/1/2021] aspirin  81 mg Oral Daily    atorvastatin  40 mg Oral Nightly    sennosides-docusate sodium  1 tablet Oral BID    nicotine  1 patch TransDERmal Daily    enoxaparin  40 mg SubCUTAneous Daily    metoprolol tartrate  12.5 mg Oral BID    budesonide-formoterol  2 puff Inhalation BID    famotidine  20 mg Oral BID      Infusions:    sodium chloride       PRN Meds: sodium chloride flush, 5-40 mL, PRN  sodium chloride, 25 mL, PRN  ondansetron, 4 mg, Q8H PRN   Or  ondansetron, 4 mg, Q6H PRN  acetaminophen, 650 mg, Q6H PRN   Or  acetaminophen, 650 mg, Q6H PRN  polyethylene glycol, 17 g, Daily PRN  fleet, 1 enema, Daily PRN  nitroGLYCERIN, 0.4 mg, Q5 Min PRN  albuterol sulfate HFA, 2 puff, Q6H PRN          Electronically signed by HUMZA Goldman CNP on 11/30/2021 at 7:15 PM

## 2021-12-01 NOTE — ED NOTES
Report called to [facility]  at this time. Transport arranged for pt.       Hank Max RN  11/30/21 2535

## 2021-12-01 NOTE — CARE COORDINATION
Chart reviewed call to Pt room pt had hip replacement at  on Monday. Pt is from home with family. Pt stated that her discharge plan was to discharge home with home care but she does not remember the name of the home care agency. LSW could not find the name in care everywhere. LSW called Dr. Kirk Delay office and had to leave a voicemail. Pt would like to return home with home care.

## 2021-12-02 LAB
EKG ATRIAL RATE: 76 BPM
EKG DIAGNOSIS: NORMAL
EKG P AXIS: -7 DEGREES
EKG P-R INTERVAL: 142 MS
EKG Q-T INTERVAL: 404 MS
EKG QRS DURATION: 116 MS
EKG QTC CALCULATION (BAZETT): 454 MS
EKG R AXIS: 98 DEGREES
EKG T AXIS: 47 DEGREES
EKG VENTRICULAR RATE: 76 BPM
FOLATE: 6.6 NG/ML (ref 3.1–17.5)
VITAMIN B-12: 216.2 PG/ML (ref 211–911)

## 2021-12-02 PROCEDURE — 93010 ELECTROCARDIOGRAM REPORT: CPT | Performed by: INTERNAL MEDICINE

## 2021-12-02 PROCEDURE — 94761 N-INVAS EAR/PLS OXIMETRY MLT: CPT

## 2021-12-02 PROCEDURE — 6370000000 HC RX 637 (ALT 250 FOR IP): Performed by: NURSE PRACTITIONER

## 2021-12-02 PROCEDURE — 97166 OT EVAL MOD COMPLEX 45 MIN: CPT

## 2021-12-02 PROCEDURE — 97530 THERAPEUTIC ACTIVITIES: CPT

## 2021-12-02 PROCEDURE — 99233 SBSQ HOSP IP/OBS HIGH 50: CPT | Performed by: INTERNAL MEDICINE

## 2021-12-02 PROCEDURE — 2580000003 HC RX 258: Performed by: NURSE PRACTITIONER

## 2021-12-02 PROCEDURE — 1200000000 HC SEMI PRIVATE

## 2021-12-02 PROCEDURE — 6360000002 HC RX W HCPCS: Performed by: NURSE PRACTITIONER

## 2021-12-02 RX ADMIN — SODIUM CHLORIDE, PRESERVATIVE FREE 10 ML: 5 INJECTION INTRAVENOUS at 09:31

## 2021-12-02 RX ADMIN — ENOXAPARIN SODIUM 40 MG: 100 INJECTION SUBCUTANEOUS at 09:30

## 2021-12-02 RX ADMIN — OXYCODONE AND ACETAMINOPHEN 1 TABLET: 5; 325 TABLET ORAL at 16:17

## 2021-12-02 RX ADMIN — SENNOSIDES AND DOCUSATE SODIUM 1 TABLET: 50; 8.6 TABLET ORAL at 09:30

## 2021-12-02 RX ADMIN — SENNOSIDES AND DOCUSATE SODIUM 1 TABLET: 50; 8.6 TABLET ORAL at 21:58

## 2021-12-02 RX ADMIN — ATORVASTATIN CALCIUM 40 MG: 40 TABLET, FILM COATED ORAL at 21:58

## 2021-12-02 RX ADMIN — ASPIRIN 81 MG: 81 TABLET, CHEWABLE ORAL at 09:30

## 2021-12-02 RX ADMIN — FAMOTIDINE 20 MG: 20 TABLET ORAL at 09:30

## 2021-12-02 RX ADMIN — METOPROLOL TARTRATE 12.5 MG: 25 TABLET, FILM COATED ORAL at 21:58

## 2021-12-02 RX ADMIN — OXYCODONE AND ACETAMINOPHEN 1 TABLET: 5; 325 TABLET ORAL at 02:36

## 2021-12-02 RX ADMIN — OXYCODONE AND ACETAMINOPHEN 1 TABLET: 5; 325 TABLET ORAL at 09:30

## 2021-12-02 RX ADMIN — SODIUM CHLORIDE, PRESERVATIVE FREE 10 ML: 5 INJECTION INTRAVENOUS at 21:59

## 2021-12-02 RX ADMIN — OXYCODONE AND ACETAMINOPHEN 1 TABLET: 5; 325 TABLET ORAL at 22:03

## 2021-12-02 RX ADMIN — FAMOTIDINE 20 MG: 20 TABLET ORAL at 21:59

## 2021-12-02 RX ADMIN — METOPROLOL TARTRATE 12.5 MG: 25 TABLET, FILM COATED ORAL at 09:30

## 2021-12-02 ASSESSMENT — PAIN SCALES - GENERAL
PAINLEVEL_OUTOF10: 7
PAINLEVEL_OUTOF10: 7
PAINLEVEL_OUTOF10: 8
PAINLEVEL_OUTOF10: 6
PAINLEVEL_OUTOF10: 6

## 2021-12-02 NOTE — PROGRESS NOTES
CARDIOLOGY PROGRESS NOTE                                                  Name:  Saman Sigala /Age/Sex: 1970  (49 y.o. female)   MRN & CSN:  [id number] & [id number] Admission Date/Time: 2021  3:01 PM   Location:  1111/1111-A PCP: Luis Nick MD         Admit Date:  2021  Hospital Day: 3      SUBJECTIVE:   Seen patient as follow up as consultation for cp    No chest pain today  No shortness of breath  No palpations    TELEMETRY: SR     No intake or output data in the 24 hours ending 21 1057    Assessment/Plan:           1. History of coronary disease STEMI s/p PCI  2. Chest pain   3. Status post right hip replacement 2021  4. Recent acute blood loss anemia     Plan for stress and echo    Continue with medical management including aspirin statins beta-blocker. Patient counseled again smoking  Need to closely follow-up with cardiology as noted       Past medical history:    has a past medical history of CAD (coronary artery disease) and MRSA infection. Past surgical history:   has a past surgical history that includes Cholecystectomy (); skin biopsy; Coronary angioplasty with stent; back surgery; and joint replacement (Right). Social History:   reports that she has been smoking cigarettes. She has been smoking about 0.50 packs per day. She has never used smokeless tobacco. She reports previous alcohol use. She reports current drug use. Frequency: 2.00 times per week. Drug: Marijuana Yfn Blow). Family history:  family history includes Arthritis in her sister; Asthma in her sister; Coronary Art Dis in her father; Diabetes in her father; Heart Disease in her father; High Blood Pressure in her father; High Cholesterol in her father.     OBJECTIVE:     /73   Pulse 89   Temp 98.8 °F (37.1 °C)   Resp 18   Ht 5' 9\" (1.753 m)   Wt 280 lb (127 kg)   LMP 2018   SpO2 97%   BMI 41.35 kg/m²   No intake or output data in the 24 hours ending 21 1057    Physical Exam:    Constitutional:  Well developed, Well nourished, No acute distress, Non-toxic appearance. HENT:  Normocephalic, Atraumatic, Bilateral external ears normal, Oropharynx moist, No oral exudates, Nose normal. Neck- Normal range of motion, No tenderness, Supple, No stridor. Eyes:  EOMI, Conjunctiva normal, No discharge. Respiratory:  Normal breath sounds, No respiratory distress, No wheezing, No chest tenderness. ,no use of accessory muscles, diaphragm movement is normal  Cardiovascular S1-S2 No Murmurs, added sounds. Normal rate rhythm. No rubs gallops. Carotid pulses and amplitude are normal no bruit noted. Pedal pulses normal femoral pulses normal.  No pedal edema  GI:  Bowel sounds normal, Soft, No tenderness  : No CVA tenderness. Musculoskeletal: No edema, No tenderness, No cyanosis, No clubbing. Back- No tenderness. Integument:  Warm, Dry, No erythema, No rash. Lymphatic:  No lymphadenopathy noted. Neurologic:  Alert & oriented x 3, No focal deficits noted.    Psychiatric:  Affect normal, Judgment normal, Mood normal.           MEDICATIONS:     sodium chloride flush  5-40 mL IntraVENous 2 times per day    aspirin  81 mg Oral Daily    atorvastatin  40 mg Oral Nightly    sennosides-docusate sodium  1 tablet Oral BID    nicotine  1 patch TransDERmal Daily    enoxaparin  40 mg SubCUTAneous Daily    metoprolol tartrate  12.5 mg Oral BID    famotidine  20 mg Oral BID      sodium chloride       budesonide-formoterol, sodium chloride flush, sodium chloride, ondansetron **OR** ondansetron, acetaminophen **OR** acetaminophen, polyethylene glycol, fleet, nitroGLYCERIN, albuterol sulfate HFA, oxyCODONE-acetaminophen  No Known Allergies    Lab Data:  CBC:   Recent Labs     11/30/21  1306 12/01/21  0959   WBC 10.3 8.0   HGB 8.5* 7.9*   HCT 28.1* 25.8*   MCV 87.5 87.5    299     BMP:   Recent Labs     11/30/21  1306 12/01/21  0959    140  139   K 4.2 4.3  4.2    105  105 CO2 22 22  25   PHOS  --  2.9   BUN 19 16  17   CREATININE 0.9 0.7  0.6     LIVER PROFILE:   Recent Labs     11/30/21  1306 12/01/21  0959   AST 24 18   ALT 20 17   BILITOT 0.1 0.2   ALKPHOS 64 64     PT/INR:   Recent Labs     11/30/21  1306   PROTIME 11.6*   INR 0.90          Xenia Eubanks MD, MD 12/2/2021 10:57 AM

## 2021-12-02 NOTE — PROGRESS NOTES
Occupational [address] [place] [address] ACUTE CARE OCCUPATIONAL THERAPY EVALUATION    Saman Sigala, 1970, 1111/1111-A, 12/2/2021    Discharge Recommendation: Home with initial 24 hour supervision/assistance, Home Health OT services (S Level 2)      History:  [place]:  The encounter diagnosis was Unstable angina pectoris ([address] Subjective:  Patient states: \"Oh, I'm glad you're here. I don't want to fall behind after my hip replacement. \"  Pain: Pt reported 5/10 surgical pain in Rt hip  Communication with other providers: TAMIR Galloway  Restrictions: General Precautions, Low Fall Risk, WBAT Rt LE, Posterior Hip Precautions, Bed/chair alarm    Home Setup/Prior level of function:  Social/Functional History  Lives With: Friend(s) (3 roommates)  Type of Home: House  Home Layout: One level  Home Access: Stairs to enter with rails  Entrance Stairs - Number of Steps: 2  Entrance Stairs - Rails: Right  Bathroom Shower/Tub: Tub/Shower unit  Bathroom Toilet: Standard  Bathroom Equipment: Shower chair, Commode  Home Equipment: Rolling walker, Reacher, Sock aid, Long-handled shoehorn, Long-handled sponge  ADL Assistance: Independent (mod I with shower chair as of late)  Homemaking Assistance: Independent  Homemaking Responsibilities: Yes (shares with roommates)  Ambulation Assistance: Independent (mod I with RW)  Transfer Assistance: Independent  Active : No  Occupation: Full time employment  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 Pleasant and agreeable to evaluation.   · [facility]   · Hearing: WFL  · Vitals: Stable vitals throughout session    Body Systems and functions:  · ROM: WNL all joints in BL UEs   · Strength: 4+/5 MMT all major muscle groups BL UEs  · Sensation: WFL (denies numbness/tingling)  · Tone: Normal  · Coordination: WNL  · Perception: WNL    Activities of Daily Living 10-14=60-79%(CL), 7-9=80-99%(CM), 6=100%(CN)]     Treatment:  Therapeutic Activity Training:   Therapeutic activity training was instructed today. Cues were given for safety, sequence, UE/LE placement, awareness, and balance. Activities performed today included bed mobility training, UB/LB dressing tasks, transfer training, household mobility with RW, education on role of OT, POC, WBAT status Rt LE, Posterior Hip Precautions, importance of EOB/OOB activity, modifying LB ADLs with LB AE, d/c planning       Safety Measures: Gait belt used, Left in Chair, Alarm in place    Assessment:  Pt is a 49year old female with a past medical history of CAD (coronary artery disease) and MRSA infection. Pt admitted with chest pain following a recent elective Rt hip JESSICA at an outside hospital. Pt undergoing further cardiac workup. Pt lives at home with three roommates and at baseline is independent with ADLs, IADLs, ambulates mod I with a RW, and works full time from home. Pt performed well this date, and from a self-care and mobility standpoint, is safe to return home at discharge with initial 24 hour supervision and [facility] OT services recommended. Complexity: Moderate  Prognosis: Good  Plan: 2x/week      Goals:  1. Pt will complete all aspects of bed mobility for EOB/OOB ADLs mod I with HOB flat  2. Pt will complete UB/LB bathing with supervision using long handled sponge  3. Pt will complete all aspects of LB dressing with supervision using LB AE  4. Pt will complete all functional transfers to and from bed, chair, toilet, shower chair mod I with use of grab bars  5. Pt will ambulate HH distance to bathroom for toileting mod I with RW  6. Pt will complete all aspects of toileting task with supervision  7. Pt will complete oral hygiene/grooming routine in standing at sink independently  8.  Pt will verbalize/be compliant with 3/3 posterior hip precautions 100% of the time      Time:   Time in: 1058  Time out: 1122  Timed treatment minutes: 9  Total time: 24      Electronically signed by:    AUNDREA Larson/L, [facility].[id number]

## 2021-12-02 NOTE — PROGRESS NOTES
ATTENDING PHYSICIAN'S PROGRESS NOTES    Patient:  Ronnie Garcia      Unit/Bed:1111/1111-A    YOB: 1970    MRN: [id number]     Acct: [de-identified]     Admit date: 11/30/2021    Patient Seen, Chart, Consults notes, Labs, Radiology studies reviewed. SUBJECTIVE:   Day 2 of stay with chest pain and associated shortness of breath, with CTA of the chest negative for PE and normal troponins; and most recent (in last 24 hours) has had resolution of the chest pain. Patient has been seen by cardiology consult and scheduled for 2D echocardiogram and stress test.  Overnight has been uneventful. Of note is patient's hip replacement 2 days ago and requiring PT/OT. Patient's stress test was postponed to tomorrow because her hemoglobin is 7.9, however they needed hemoglobin of 8 for the procedure. All other ROS negative except noted in HPI    Past, Family, Social History unchanged from admission. Diet:  ADULT DIET;  Regular; Low Fat/Low Chol/High Fiber/TIM; No Added Salt (3-4 gm)  Diet NPO    Medications:  Scheduled Meds:   sodium chloride flush  5-40 mL IntraVENous 2 times per day    aspirin  81 mg Oral Daily    atorvastatin  40 mg Oral Nightly    sennosides-docusate sodium  1 tablet Oral BID    nicotine  1 patch TransDERmal Daily    enoxaparin  40 mg SubCUTAneous Daily    metoprolol tartrate  12.5 mg Oral BID    famotidine  20 mg Oral BID     Continuous Infusions:   sodium chloride       PRN Meds:budesonide-formoterol, sodium chloride flush, sodium chloride, ondansetron **OR** ondansetron, acetaminophen **OR** acetaminophen, polyethylene glycol, fleet, nitroGLYCERIN, albuterol sulfate HFA, oxyCODONE-acetaminophen    OBJECTIVE:    CBC:   Recent Labs     11/30/21  1306 12/01/21  0959   WBC 10.3 8.0   HGB 8.5* 7.9*    299     BMP:    Recent Labs     11/30/21  1306 12/01/21  0959    140  139   K 4.2 4.3  4.2    105  105   CO2 22 22  25   BUN 19 16  17   CREATININE 0.9 0.7 0.6   GLUCOSE 120* 102*  99     Calcium:  Recent Labs     12/01/21  0959   CALCIUM 8.5  8.2*     Ionized Calcium:No results for input(s): IONCA in the last 72 hours. Magnesium:  Recent Labs     12/01/21  0959   MG 1.8     Phosphorus:  Recent Labs     12/01/21  0959   PHOS 2.9     BNP:No results for input(s): BNP in the last 72 hours. Glucose:No results for input(s): POCGLU in the last 72 hours. HgbA1C: No results for input(s): LABA1C in the last 72 hours. INR:   Recent Labs     11/30/21  1306   INR 0.90     Hepatic:   Recent Labs     12/01/21  0959   ALKPHOS 64   ALT 17   AST 18   PROT 5.3*   BILITOT 0.2   LABALBU 3.5  3.5     Amylase and Lipase:No results for input(s): LACTA, AMYLASE in the last 72 hours. Lactic Acid: No results for input(s): LACTA in the last 72 hours. Troponin:   Recent Labs     11/30/21  1306 11/30/21  2211 11/30/21  2237   TROPONINT <0.010 <0.010 <0.010     BNP: No results for input(s): BNP in the last 72 hours.   Lipids:   Recent Labs     12/01/21  0959   CHOL 147   TRIG 136   HDL 43     ABGs: No results found for: PH, PCO2, PO2, HCO3, O2SAT    Radiology reports as per the Radiologist  Radiology: Echocardiogram complete 2D with doppler with color    Result Date: 12/1/2021  Transthoracic Echocardiography Report (TTE)  Demographics   Patient Name       Destinee Rose      Date of Study       12/01/2021   Date of Birth      1970         Gender              Female   Age                49 year(s)         Race                   Patient Number     [id number]         Room Number         1407   Visit Number       [id number]   Corporate ID       [id number]   Accession Number   [id number]         Anais Roman RN   Ordering Physician Don Nguyễn                     CNP                Physician           MD  Procedure Type of Study   TTE procedure:ECHOCARDIOGRAM COMPLETE 2D W DOPPLER W COLOR. Procedure Date Date: 12/01/2021 Start: 08:10 AM Study Location: Portable Indications:Chest pain. Patient Status: Routine Height: 69 inches Weight: 280 pounds BSA: 2.38 m2 BMI: 41.35 kg/m2 HR: 82 bpm BP: 103/69 mmHg  Conclusions   Summary  Left ventricular systolic function is normal.  Ejection fraction is visually estimated at 50%. Grade I diastolic dysfunction. No regional wall motion abnormalites. Right heart is moderately dilated (chronic). Moderate tricuspid regurgitation is present. RVSP is 53 mmHg. No evidence of any pericardial effusion. Signature   ------------------------------------------------------------------  Electronically signed by Alana Del Castillo MD (Interpreting  physician) on 12/01/2021 at 11:58 AM  ------------------------------------------------------------------   Findings   Left Ventricle  Left ventricular systolic function is normal.  Ejection fraction is visually estimated at 50%. Grade I diastolic dysfunction. No regional wall motion abnormalites. Left Atrium  Essentially normal left atrium. Right Atrium  Moderately dilated right atrium. Right Ventricle  Moderately dilated right ventricle. Aortic Valve  Structurally normal aortic valve. Mitral Valve  Trace mitral regurgitation is present. Tricuspid Valve  Moderate tricuspid regurgitation is present. RVSP is 53 mmHg. Pulmonic Valve  Pulmonic valve is structurally normal.   Pericardial Effusion  No evidence of any pericardial effusion.   M-Mode/2D Measurements & Calculations   LV Diastolic Dimension:   LV Systolic Dimension:   LA Dimension: 4.5 cmAO  4.49 cm                   3.42 cm                  Root Dimension: 3.1 cm  LV FS:23.8 %              LV Volume Diastolic:  LV PW Diastolic: 1 cm     37.5 ml  LV PW Systolic: 2.79 cm   LV Volume Systolic: 40  Septum Diastolic: 7.78 cm ml                       RV Diastolic Dimension:  Septum Systolic: 1.3 cm   LV 11/30/2021  EXAMINATION: CTA OF THE CHEST 11/30/2021 5:06 pm TECHNIQUE: CTA of the chest was performed after the administration of intravenous contrast.  Multiplanar reformatted images are provided for review. MIP images are provided for review. Dose modulation, iterative reconstruction, and/or weight based adjustment of the mA/kV was utilized to reduce the radiation dose to as low as reasonably achievable. 90 ml isovue 370 used COMPARISON: CTA chest 06/29/2020 HISTORY: ORDERING SYSTEM PROVIDED HISTORY: SOB. Rt. hip replacement yesterday. Decision Support Exception - unselect if not a suspected or confirmed emergency medical condition->Emergency Medical Condition (MA) Reason for Exam: SOB. Rt. hip replacement yesterday. Acuity: Acute Type of Exam: Initial Additional signs and symptoms: no Relevant Medical/Surgical History: FINDINGS: Pulmonary Arteries: Pulmonary arteries are adequately opacified for evaluation. No evidence of intraluminal filling defect to suggest pulmonary embolism. Main pulmonary artery is 38 mm diameter. Mediastinum: No evidence of mediastinal lymphadenopathy. Small hiatal hernia. The heart is mildly enlarged. The pericardium demonstrates no acute abnormality. There is no acute abnormality of the thoracic aorta. The ascending thoracic aorta is 30 mm and descending thoracic aorta 27 mm. Lungs/pleura: The lungs are without acute process. Focal linear band of subsegmental atelectasis lateral inferior lingula left upper lobe. Subtle areas of air trapping predominate mid and upper lungs typical of chronic smoking sequela (chronic bronchiolitis). No other focal consolidation or pulmonary edema. No evidence of pleural effusion or pneumothorax. Upper Abdomen: Hepatic steatosis. Status post cholecystectomy. Limited images of the upper abdomen are otherwise unremarkable. Soft Tissues/Bones: No acute bone or soft tissue abnormality. 1. No acute pulmonary embolism.  2. No acute pulmonary recommendation for stress test and 2D echocardiogram.  We will keep patient n.p.o. after midnight for the procedures. Repeat CBC in the morning and if hemoglobin is above 8, patient can have the stress test as recommended. Active Problems:    Coronary artery disease involving native coronary artery of native heart with angina pectoris [facility])  Patient had a recent STEMI with PCI, and presenting with chest pain. She admits to being compliant with her medication regimen. Input by cardiology consult noted and appreciated. Mixed hyperlipidemia  Continue with statin as ordered, follow-up on lipid panel. Primary osteoarthritis of right hip  Status post total hip arthroplasty by Dr. Mavis Rowland, orders placed for PT/OT. Optimize pain control. Resolved Problems:    * No resolved hospital problems. *    2D echocardiogram completed, stress test on hold until hemoglobin is at 8. Maintain patient in-house overnight and repeat CBC in the morning.     Electronically signed by Ifrah Cohen MD on 12/2/2021 at 6:00 PM    Rounding Hospitalist

## 2021-12-03 ENCOUNTER — TELEPHONE (OUTPATIENT)
Dept: FAMILY MEDICINE CLINIC | Age: 51
End: 2021-12-03

## 2021-12-03 LAB
BASOPHILS ABSOLUTE: 0.1 K/CU MM
BASOPHILS RELATIVE PERCENT: 0.6 % (ref 0–1)
DIFFERENTIAL TYPE: ABNORMAL
EOSINOPHILS ABSOLUTE: 0.3 K/CU MM
EOSINOPHILS RELATIVE PERCENT: 3.8 % (ref 0–3)
HCT VFR BLD CALC: 24.3 % (ref 37–47)
HEMOGLOBIN: 7.4 GM/DL (ref 12.5–16)
IMMATURE NEUTROPHIL %: 0.4 % (ref 0–0.43)
LYMPHOCYTES ABSOLUTE: 2.1 K/CU MM
LYMPHOCYTES RELATIVE PERCENT: 26.5 % (ref 24–44)
MCH RBC QN AUTO: 27 PG (ref 27–31)
MCHC RBC AUTO-ENTMCNC: 30.5 % (ref 32–36)
MCV RBC AUTO: 88.7 FL (ref 78–100)
MONOCYTES ABSOLUTE: 0.9 K/CU MM
MONOCYTES RELATIVE PERCENT: 10.9 % (ref 0–4)
NUCLEATED RBC %: 0 %
PDW BLD-RTO: 16.5 % (ref 11.7–14.9)
PLATELET # BLD: 295 K/CU MM (ref 140–440)
PMV BLD AUTO: 8.8 FL (ref 7.5–11.1)
RBC # BLD: 2.74 M/CU MM (ref 4.2–5.4)
SEGMENTED NEUTROPHILS ABSOLUTE COUNT: 4.5 K/CU MM
SEGMENTED NEUTROPHILS RELATIVE PERCENT: 57.8 % (ref 36–66)
TOTAL IMMATURE NEUTOROPHIL: 0.03 K/CU MM
TOTAL NUCLEATED RBC: 0 K/CU MM
WBC # BLD: 7.8 K/CU MM (ref 4–10.5)

## 2021-12-03 PROCEDURE — 86850 RBC ANTIBODY SCREEN: CPT

## 2021-12-03 PROCEDURE — 2580000003 HC RX 258: Performed by: INTERNAL MEDICINE

## 2021-12-03 PROCEDURE — 6360000002 HC RX W HCPCS: Performed by: INTERNAL MEDICINE

## 2021-12-03 PROCEDURE — 36415 COLL VENOUS BLD VENIPUNCTURE: CPT

## 2021-12-03 PROCEDURE — P9016 RBC LEUKOCYTES REDUCED: HCPCS

## 2021-12-03 PROCEDURE — 6370000000 HC RX 637 (ALT 250 FOR IP): Performed by: NURSE PRACTITIONER

## 2021-12-03 PROCEDURE — 6360000002 HC RX W HCPCS: Performed by: NURSE PRACTITIONER

## 2021-12-03 PROCEDURE — 2580000003 HC RX 258: Performed by: NURSE PRACTITIONER

## 2021-12-03 PROCEDURE — 85025 COMPLETE CBC W/AUTO DIFF WBC: CPT

## 2021-12-03 PROCEDURE — 86900 BLOOD TYPING SEROLOGIC ABO: CPT

## 2021-12-03 PROCEDURE — 36430 TRANSFUSION BLD/BLD COMPNT: CPT

## 2021-12-03 PROCEDURE — 99233 SBSQ HOSP IP/OBS HIGH 50: CPT | Performed by: INTERNAL MEDICINE

## 2021-12-03 PROCEDURE — 94761 N-INVAS EAR/PLS OXIMETRY MLT: CPT

## 2021-12-03 PROCEDURE — 86901 BLOOD TYPING SEROLOGIC RH(D): CPT

## 2021-12-03 PROCEDURE — 86922 COMPATIBILITY TEST ANTIGLOB: CPT

## 2021-12-03 PROCEDURE — 1200000000 HC SEMI PRIVATE

## 2021-12-03 RX ORDER — SODIUM CHLORIDE 9 MG/ML
INJECTION, SOLUTION INTRAVENOUS PRN
Status: DISCONTINUED | OUTPATIENT
Start: 2021-12-03 | End: 2021-12-05 | Stop reason: HOSPADM

## 2021-12-03 RX ADMIN — OXYCODONE AND ACETAMINOPHEN 1 TABLET: 5; 325 TABLET ORAL at 11:11

## 2021-12-03 RX ADMIN — FAMOTIDINE 20 MG: 20 TABLET ORAL at 10:47

## 2021-12-03 RX ADMIN — OXYCODONE AND ACETAMINOPHEN 1 TABLET: 5; 325 TABLET ORAL at 17:25

## 2021-12-03 RX ADMIN — IRON SUCROSE 500 MG: 20 INJECTION, SOLUTION INTRAVENOUS at 11:06

## 2021-12-03 RX ADMIN — ASPIRIN 81 MG: 81 TABLET, CHEWABLE ORAL at 10:46

## 2021-12-03 RX ADMIN — SENNOSIDES AND DOCUSATE SODIUM 1 TABLET: 50; 8.6 TABLET ORAL at 10:46

## 2021-12-03 RX ADMIN — METOPROLOL TARTRATE 12.5 MG: 25 TABLET, FILM COATED ORAL at 10:47

## 2021-12-03 RX ADMIN — OXYCODONE AND ACETAMINOPHEN 1 TABLET: 5; 325 TABLET ORAL at 04:03

## 2021-12-03 RX ADMIN — SODIUM CHLORIDE 25 ML: 9 INJECTION, SOLUTION INTRAVENOUS at 17:26

## 2021-12-03 ASSESSMENT — PAIN SCALES - GENERAL
PAINLEVEL_OUTOF10: 0
PAINLEVEL_OUTOF10: 6
PAINLEVEL_OUTOF10: 6
PAINLEVEL_OUTOF10: 7

## 2021-12-03 NOTE — PROGRESS NOTES
[id number] [facility] Liaison spoke with pt and pt is agreeable to c at discharge.  Please place inpatient consult to home health needs order in [facility].

## 2021-12-03 NOTE — CARE COORDINATION
Chart reviewed. CM talked with patient who wants [place] [id number] at discharge and states that she was supposed to have it set up with [facility] but was not home long enough. She does not have a preference in [place] [address] agency's, so CM sent referral to [place] [id number]. She states her HGB is low today and she will be getting a transfusion.

## 2021-12-03 NOTE — PROGRESS NOTES
Physician Progress Note      Lora Clay  [facility] #:                  [id number]  :                       1970  ADMIT DATE:       2021 3:01 PM  [address] [facility] [place] DATE:  RESPONDING  PROVIDER #:        Meghann Raphael MD          QUERY TEXT:    Hospitalists,    Pt admitted with chest pain following  right hip replacement. If possible,   please document in progress notes and discharge summary if you are evaluating   and/or treating any of the following: The medical record reflects the following:  Risk Factors: CAD  Clinical Indicators: c/o chest pain-resolved, CXR: . Mild chronic pulmonary   changes typical bronchiolitis related to smoking, possible early COPD, ABLA  Treatment: labs, imaging, Cardiology consult, telemetry, serial troponin,   nitroglycerin sublingual as needed, aspirin, atorvastatin, pepcid    Thank you,  Valente Caicedo RN CDS  [phone]  Options provided:  -- Chest pain due to CAD with unstable angina  -- Chest pain due to NSTEMI  -- Chest pain due to GERD  -- Chest pain due to anemia  -- Chest pain due to bronchiolitis/COPD  -- Chest pain due to ##please specify, please specify. -- Other - I will add my own diagnosis  -- Disagree - Not applicable / Not valid  -- Disagree - Clinically unable to determine / Unknown  -- Refer to Clinical Documentation Reviewer    PROVIDER RESPONSE TEXT:    Provider is clinically unable to determine a response to this query.     Query created by: Edward Marie on 12/3/2021 1:08 PM      Electronically signed by:  Meghann Raphael MD 12/3/2021 1:14 PM

## 2021-12-03 NOTE — TELEPHONE ENCOUNTER
Hospital wanting Dr. Korin Armas aware they are starting [address] at [facility] for the patient.  Going to be PT, OT< and Home aid

## 2021-12-03 NOTE — PROGRESS NOTES
ATTENDING PHYSICIAN'S PROGRESS NOTES    Patient:  Neha Keller      Unit/Bed:1111/1111-A    YOB: 1970    MRN: [id number]     Acct: [de-identified]     Admit date: 11/30/2021    Patient Seen, Chart, Consults notes, Labs, Radiology studies reviewed. SUBJECTIVE:   Day 3 of stay with chest pain and associated shortness of breath, with CTA of the chest negative for PE and normal troponins; and most recent (in last 24 hours) has had resolution of the chest pain. Patient has blood levels declining further and work-up has shown iron deficiency. She has been scheduled for 500 mg of IV Venofer and 1 unit of packed red blood cells. Seen by cardiology with recommendation for outpatient stress test.    All other ROS negative except noted in HPI    Past, Family, Social History unchanged from admission. Diet:  ADULT DIET;  Regular; Low Fat/Low Chol/High Fiber/TIM; No Added Salt (3-4 gm)    Medications:  Scheduled Meds:   iron sucrose  500 mg IntraVENous Once    sodium chloride flush  5-40 mL IntraVENous 2 times per day    aspirin  81 mg Oral Daily    atorvastatin  40 mg Oral Nightly    sennosides-docusate sodium  1 tablet Oral BID    nicotine  1 patch TransDERmal Daily    enoxaparin  40 mg SubCUTAneous Daily    metoprolol tartrate  12.5 mg Oral BID    famotidine  20 mg Oral BID     Continuous Infusions:   sodium chloride      sodium chloride       PRN Meds:sodium chloride, budesonide-formoterol, sodium chloride flush, sodium chloride, ondansetron **OR** ondansetron, acetaminophen **OR** acetaminophen, polyethylene glycol, fleet, nitroGLYCERIN, albuterol sulfate HFA, oxyCODONE-acetaminophen    OBJECTIVE:    CBC:   Recent Labs     12/01/21  0959 12/03/21  0641   WBC 8.0 7.8   HGB 7.9* 7.4*    295     BMP:    Recent Labs     12/01/21  0959     139   K 4.3  4.2     105   CO2 22  25   BUN 16  17   CREATININE 0.7  0.6   GLUCOSE 102*  99     Calcium:  Recent Labs 12/01/21  0959   CALCIUM 8.5  8.2*     Ionized Calcium:No results for input(s): IONCA in the last 72 hours. Magnesium:  Recent Labs     12/01/21  0959   MG 1.8     Phosphorus:  Recent Labs     12/01/21  0959   PHOS 2.9     BNP:No results for input(s): BNP in the last 72 hours. Glucose:No results for input(s): POCGLU in the last 72 hours. HgbA1C: No results for input(s): LABA1C in the last 72 hours. INR:   No results for input(s): INR in the last 72 hours. Hepatic:   Recent Labs     12/01/21  0959   ALKPHOS 64   ALT 17   AST 18   PROT 5.3*   BILITOT 0.2   LABALBU 3.5  3.5     Amylase and Lipase:No results for input(s): LACTA, AMYLASE in the last 72 hours. Lactic Acid: No results for input(s): LACTA in the last 72 hours. Troponin:   Recent Labs     11/30/21  2211 11/30/21  2237   TROPONINT <0.010 <0.010     BNP: No results for input(s): BNP in the last 72 hours. Lipids:   Recent Labs     12/01/21  0959   CHOL 147   TRIG 136   HDL 43     ABGs: No results found for: PH, PCO2, PO2, HCO3, O2SAT    Radiology reports as per the Radiologist  Radiology: Echocardiogram complete 2D with doppler with color    Result Date: 12/1/2021  Transthoracic Echocardiography Report (TTE)  Demographics   Patient Name       Florencio Starks      Date of Study       12/01/2021   Date of Birth      1970         Gender              Female   Age                49 year(s)         Race                   Patient Number     [id number]         Room Number         [facility]   Visit Number       [id number]   Corporate ID       [id number]   Accession Number   [id number]         Elvie Buchanan RN   Ordering Physician April Ip                     CNP                Physician           MD  Procedure Type of Study   TTE procedure:ECHOCARDIOGRAM COMPLETE 2D W DOPPLER W COLOR.   Procedure Date Date: 12/01/2021 Start: 08:10 AM Study Location: Portable Indications:Chest pain. Patient Status: Routine Height: 69 inches Weight: 280 pounds BSA: 2.38 m2 BMI: 41.35 kg/m2 HR: 82 bpm BP: 103/69 mmHg  Conclusions   Summary  Left ventricular systolic function is normal.  Ejection fraction is visually estimated at 50%. Grade I diastolic dysfunction. No regional wall motion abnormalites. Right heart is moderately dilated (chronic). Moderate tricuspid regurgitation is present. RVSP is 53 mmHg. No evidence of any pericardial effusion. Signature   ------------------------------------------------------------------  Electronically signed by Rosamaria Dutton MD (Interpreting  physician) on 12/01/2021 at 11:58 AM  ------------------------------------------------------------------   Findings   Left Ventricle  Left ventricular systolic function is normal.  Ejection fraction is visually estimated at 50%. Grade I diastolic dysfunction. No regional wall motion abnormalites. Left Atrium  Essentially normal left atrium. Right Atrium  Moderately dilated right atrium. Right Ventricle  Moderately dilated right ventricle. Aortic Valve  Structurally normal aortic valve. Mitral Valve  Trace mitral regurgitation is present. Tricuspid Valve  Moderate tricuspid regurgitation is present. RVSP is 53 mmHg. Pulmonic Valve  Pulmonic valve is structurally normal.   Pericardial Effusion  No evidence of any pericardial effusion.   M-Mode/2D Measurements & Calculations   LV Diastolic Dimension:   LV Systolic Dimension:   LA Dimension: 4.5 cmAO  4.49 cm                   3.42 cm                  Root Dimension: 3.1 cm  LV FS:23.8 %              LV Volume Diastolic:  LV PW Diastolic: 1 cm     84.2 ml  LV PW Systolic: 7.83 cm   LV Volume Systolic: 40  Septum Diastolic: 8.78 cm ml                       RV Diastolic Dimension:  Septum Systolic: 1.3 cm   LV EDV/LV EDV Index:     4.42 cm  CO: 7.08 l/min            90.5 ml/38 m2LV ESV/LV  CI: 2.97 l/m*m2 ESV Index: 40 ml/17 m2   LA/Aorta: 1.45                            EF Calculated (A4C):                            55.8 %                            EF Calculated (2D): 47.7                            %                             LVOT: 2.4 cm  Doppler Measurements & Calculations   MV Peak E-Wave: 91.3 AV Peak Velocity: 156 cm/s    LVOT Peak Velocity: 90.2  cm/s                 AV Peak Gradient: 9.73 mmHg   cm/s  MV Peak A-Wave: 120  AV Mean Velocity: 111 cm/s    LVOT Mean Velocity: 61.8  cm/s                 AV Mean Gradient: 6 mmHg      cm/s  MV E/A Ratio: 0.76   AV VTI: 27.6 cm               LVOT Peak Gradient: 3  MV Peak Gradient:    AV Area (Continuity):3.13 cm2 mmHgLVOT Mean Gradient: 2  3.33 mmHg                                          mmHg                       LVOT VTI: 19.1 cm             Estimated RVSP: 53 mmHg  MV P1/2t: 55 msec                                  Estimated RAP:3 mmHg  MVA by PHT:4 cm2     Estimated PASP: 52.56 mmHg                                                      TR Velocity:352 cm/s  MV E' Lateral                                      TR Gradient:49.56 mmHg  Velocity: 10.7 cm/s   MV E/E' lateral:  8.53      XR CHEST PORTABLE    Result Date: 11/30/2021  EXAMINATION: ONE XRAY VIEW OF THE CHEST 11/30/2021 3:30 pm COMPARISON: Chest 06/28/2020 HISTORY: ORDERING SYSTEM PROVIDED HISTORY: Chest pain Acuity: Acute Type of Exam: Initial FINDINGS: The cardiac silhouette is enlarged. Small to moderate size hiatal hernia evident. The mediastinal and hilar silhouettes appear otherwise unremarkable. The lungs appear clear. No pleural effusion. No pneumothorax is seen. No acute osseous abnormality is identified. 1. No radiographic evidence of acute pulmonary disease. 2. Cardiomegaly. 3. Small to moderate size hiatal hernia.      CTA PULMONARY W CONTRAST    Result Date: 11/30/2021  EXAMINATION: CTA OF THE CHEST 11/30/2021 5:06 pm TECHNIQUE: CTA of the chest was performed after the administration of intravenous contrast.  Multiplanar reformatted images are provided for review. MIP images are provided for review. Dose modulation, iterative reconstruction, and/or weight based adjustment of the mA/kV was utilized to reduce the radiation dose to as low as reasonably achievable. 90 ml isovue 370 used COMPARISON: CTA chest 06/29/2020 HISTORY: ORDERING SYSTEM PROVIDED HISTORY: SOB. Rt. hip replacement yesterday. Decision Support Exception - unselect if not a suspected or confirmed emergency medical condition->Emergency Medical Condition (MA) Reason for Exam: SOB. Rt. hip replacement yesterday. Acuity: Acute Type of Exam: Initial Additional signs and symptoms: no Relevant Medical/Surgical History: FINDINGS: Pulmonary Arteries: Pulmonary arteries are adequately opacified for evaluation. No evidence of intraluminal filling defect to suggest pulmonary embolism. Main pulmonary artery is 38 mm diameter. Mediastinum: No evidence of mediastinal lymphadenopathy. Small hiatal hernia. The heart is mildly enlarged. The pericardium demonstrates no acute abnormality. There is no acute abnormality of the thoracic aorta. The ascending thoracic aorta is 30 mm and descending thoracic aorta 27 mm. Lungs/pleura: The lungs are without acute process. Focal linear band of subsegmental atelectasis lateral inferior lingula left upper lobe. Subtle areas of air trapping predominate mid and upper lungs typical of chronic smoking sequela (chronic bronchiolitis). No other focal consolidation or pulmonary edema. No evidence of pleural effusion or pneumothorax. Upper Abdomen: Hepatic steatosis. Status post cholecystectomy. Limited images of the upper abdomen are otherwise unremarkable. Soft Tissues/Bones: No acute bone or soft tissue abnormality. 1. No acute pulmonary embolism. 2. No acute pulmonary disease. 3. Dilated main pulmonary artery can be seen with pulmonary hypertension but is nonspecific.  4. Mild

## 2021-12-03 NOTE — PROGRESS NOTES
CARDIOLOGY PROGRESS NOTE                                                  Name:  Corie Tidwell /Age/Sex: 1970  (49 y.o. female)   MRN & CSN:  [id number] & [id number] Admission Date/Time: 2021  3:01 PM   Location:  1111/1111-A PCP: Cala Galeazzi, MD         Admit Date:  2021  Hospital Day: 4      SUBJECTIVE:   Seen patient as follow up as consultation for cp    No chest pain today  No shortness of breath  No palpations    TELEMETRY: SR     No intake or output data in the 24 hours ending 21 0714    Assessment/Plan:           1. History of coronary disease STEMI s/p PCI  2. Chest pain   3. Status post right hip replacement 2021  4. Recent acute blood loss anemia     Echo normal  Stress could not be done due to persistent low Hb (7.3 today)  Recommend outpt follow up and stress as out pt  Please address anemia per primary team   Continue with medical management including aspirin statins beta-blocker. Patient counseled again smoking         Past medical history:    has a past medical history of CAD (coronary artery disease) and MRSA infection. Past surgical history:   has a past surgical history that includes Cholecystectomy (); skin biopsy; Coronary angioplasty with stent; back surgery; and joint replacement (Right). Social History:   reports that she has been smoking cigarettes. She has been smoking about 0.50 packs per day. She has never used smokeless tobacco. She reports previous alcohol use. She reports current drug use. Frequency: 2.00 times per week. Drug: Marijuana Hulon Tobias). Family history:  family history includes Arthritis in her sister; Asthma in her sister; Coronary Art Dis in her father; Diabetes in her father; Heart Disease in her father; High Blood Pressure in her father; High Cholesterol in her father.     OBJECTIVE:     /66   Pulse 80   Temp 97.8 °F (36.6 °C) (Oral)   Resp 18   Ht 5' 9\" (1.753 m)   Wt 280 lb (127 kg)   LMP 2018   SpO2 96%   BMI 341 299 295     BMP:   Recent Labs     11/30/21  1306 12/01/21  0959    140  139   K 4.2 4.3  4.2    105  105   CO2 22 22  25   PHOS  --  2.9   BUN 19 16  17   CREATININE 0.9 0.7  0.6     LIVER PROFILE:   Recent Labs     11/30/21  1306 12/01/21  0959   AST 24 18   ALT 20 17   BILITOT 0.1 0.2   ALKPHOS 64 64     PT/INR:   Recent Labs     11/30/21  1306   PROTIME 11.6*   INR 0.90          Raffi Ham MD, MD 12/3/2021 7:14 AM

## 2021-12-04 LAB
ABO/RH: NORMAL
ANTIBODY SCREEN: NEGATIVE
BASOPHILS ABSOLUTE: 0.1 K/CU MM
BASOPHILS RELATIVE PERCENT: 0.8 % (ref 0–1)
COMPONENT: NORMAL
CROSSMATCH RESULT: NORMAL
DIFFERENTIAL TYPE: ABNORMAL
EOSINOPHILS ABSOLUTE: 0.3 K/CU MM
EOSINOPHILS RELATIVE PERCENT: 2.7 % (ref 0–3)
HCT VFR BLD CALC: 27.9 % (ref 37–47)
HEMOGLOBIN: 8.6 GM/DL (ref 12.5–16)
IMMATURE NEUTROPHIL %: 1.1 % (ref 0–0.43)
LYMPHOCYTES ABSOLUTE: 2.1 K/CU MM
LYMPHOCYTES RELATIVE PERCENT: 23.2 % (ref 24–44)
MCH RBC QN AUTO: 27 PG (ref 27–31)
MCHC RBC AUTO-ENTMCNC: 30.8 % (ref 32–36)
MCV RBC AUTO: 87.5 FL (ref 78–100)
MONOCYTES ABSOLUTE: 1.1 K/CU MM
MONOCYTES RELATIVE PERCENT: 11.9 % (ref 0–4)
NUCLEATED RBC %: 0 %
PDW BLD-RTO: 15.9 % (ref 11.7–14.9)
PLATELET # BLD: 358 K/CU MM (ref 140–440)
PMV BLD AUTO: 8.7 FL (ref 7.5–11.1)
RBC # BLD: 3.19 M/CU MM (ref 4.2–5.4)
SEGMENTED NEUTROPHILS ABSOLUTE COUNT: 5.6 K/CU MM
SEGMENTED NEUTROPHILS RELATIVE PERCENT: 60.3 % (ref 36–66)
STATUS: NORMAL
TOTAL IMMATURE NEUTOROPHIL: 0.1 K/CU MM
TOTAL NUCLEATED RBC: 0 K/CU MM
TRANSFUSION STATUS: NORMAL
UNIT DIVISION: 0
UNIT NUMBER: NORMAL
WBC # BLD: 9.2 K/CU MM (ref 4–10.5)

## 2021-12-04 PROCEDURE — 85025 COMPLETE CBC W/AUTO DIFF WBC: CPT

## 2021-12-04 PROCEDURE — 2580000003 HC RX 258: Performed by: NURSE PRACTITIONER

## 2021-12-04 PROCEDURE — 6370000000 HC RX 637 (ALT 250 FOR IP): Performed by: NURSE PRACTITIONER

## 2021-12-04 PROCEDURE — 6360000002 HC RX W HCPCS: Performed by: NURSE PRACTITIONER

## 2021-12-04 PROCEDURE — 94761 N-INVAS EAR/PLS OXIMETRY MLT: CPT

## 2021-12-04 PROCEDURE — 36415 COLL VENOUS BLD VENIPUNCTURE: CPT

## 2021-12-04 PROCEDURE — 1200000000 HC SEMI PRIVATE

## 2021-12-04 RX ORDER — OXYCODONE HYDROCHLORIDE AND ACETAMINOPHEN 5; 325 MG/1; MG/1
1 TABLET ORAL EVERY 6 HOURS PRN
Qty: 20 TABLET | Refills: 0 | Status: SHIPPED | OUTPATIENT
Start: 2021-12-04 | End: 2021-12-11

## 2021-12-04 RX ORDER — ASCORBIC ACID 500 MG
500 TABLET ORAL 2 TIMES DAILY
Qty: 30 TABLET | Refills: 3 | Status: SHIPPED | OUTPATIENT
Start: 2021-12-04 | End: 2022-10-26

## 2021-12-04 RX ORDER — FERROUS SULFATE 325(65) MG
325 TABLET ORAL 2 TIMES DAILY
Qty: 60 TABLET | Refills: 5 | Status: SHIPPED | OUTPATIENT
Start: 2021-12-04 | End: 2022-10-26

## 2021-12-04 RX ORDER — ATORVASTATIN CALCIUM 40 MG/1
40 TABLET, FILM COATED ORAL NIGHTLY
Qty: 30 TABLET | Refills: 0 | Status: ON HOLD | OUTPATIENT
Start: 2021-12-04 | End: 2022-10-21 | Stop reason: HOSPADM

## 2021-12-04 RX ORDER — SENNA AND DOCUSATE SODIUM 50; 8.6 MG/1; MG/1
2 TABLET, FILM COATED ORAL 2 TIMES DAILY PRN
Qty: 60 TABLET | Refills: 2 | Status: SHIPPED | OUTPATIENT
Start: 2021-12-04 | End: 2022-10-26

## 2021-12-04 RX ADMIN — SENNOSIDES AND DOCUSATE SODIUM 1 TABLET: 50; 8.6 TABLET ORAL at 09:30

## 2021-12-04 RX ADMIN — METOPROLOL TARTRATE 12.5 MG: 25 TABLET, FILM COATED ORAL at 22:59

## 2021-12-04 RX ADMIN — METOPROLOL TARTRATE 12.5 MG: 25 TABLET, FILM COATED ORAL at 09:30

## 2021-12-04 RX ADMIN — SODIUM CHLORIDE, PRESERVATIVE FREE 10 ML: 5 INJECTION INTRAVENOUS at 23:00

## 2021-12-04 RX ADMIN — ASPIRIN 81 MG: 81 TABLET, CHEWABLE ORAL at 09:30

## 2021-12-04 RX ADMIN — SODIUM CHLORIDE, PRESERVATIVE FREE 10 ML: 5 INJECTION INTRAVENOUS at 00:16

## 2021-12-04 RX ADMIN — FAMOTIDINE 20 MG: 20 TABLET ORAL at 09:30

## 2021-12-04 RX ADMIN — SENNOSIDES AND DOCUSATE SODIUM 1 TABLET: 50; 8.6 TABLET ORAL at 22:57

## 2021-12-04 RX ADMIN — FAMOTIDINE 20 MG: 20 TABLET ORAL at 22:59

## 2021-12-04 RX ADMIN — ENOXAPARIN SODIUM 40 MG: 100 INJECTION SUBCUTANEOUS at 09:31

## 2021-12-04 RX ADMIN — ATORVASTATIN CALCIUM 40 MG: 40 TABLET, FILM COATED ORAL at 00:16

## 2021-12-04 RX ADMIN — ATORVASTATIN CALCIUM 40 MG: 40 TABLET, FILM COATED ORAL at 22:59

## 2021-12-04 RX ADMIN — FAMOTIDINE 20 MG: 20 TABLET ORAL at 00:13

## 2021-12-04 RX ADMIN — OXYCODONE AND ACETAMINOPHEN 1 TABLET: 5; 325 TABLET ORAL at 13:09

## 2021-12-04 RX ADMIN — METOPROLOL TARTRATE 12.5 MG: 25 TABLET, FILM COATED ORAL at 00:13

## 2021-12-04 RX ADMIN — OXYCODONE AND ACETAMINOPHEN 1 TABLET: 5; 325 TABLET ORAL at 18:48

## 2021-12-04 RX ADMIN — OXYCODONE AND ACETAMINOPHEN 1 TABLET: 5; 325 TABLET ORAL at 00:13

## 2021-12-04 RX ADMIN — OXYCODONE AND ACETAMINOPHEN 1 TABLET: 5; 325 TABLET ORAL at 06:55

## 2021-12-04 RX ADMIN — SENNOSIDES AND DOCUSATE SODIUM 1 TABLET: 50; 8.6 TABLET ORAL at 00:12

## 2021-12-04 ASSESSMENT — PAIN DESCRIPTION - ORIENTATION
ORIENTATION: RIGHT

## 2021-12-04 ASSESSMENT — PAIN DESCRIPTION - ONSET
ONSET: ON-GOING

## 2021-12-04 ASSESSMENT — PAIN DESCRIPTION - LOCATION
LOCATION: HIP

## 2021-12-04 ASSESSMENT — PAIN SCALES - GENERAL
PAINLEVEL_OUTOF10: 7
PAINLEVEL_OUTOF10: 10
PAINLEVEL_OUTOF10: 8
PAINLEVEL_OUTOF10: 4
PAINLEVEL_OUTOF10: 4

## 2021-12-04 ASSESSMENT — PAIN DESCRIPTION - PROGRESSION
CLINICAL_PROGRESSION: NOT CHANGED

## 2021-12-04 ASSESSMENT — PAIN DESCRIPTION - PAIN TYPE
TYPE: SURGICAL PAIN

## 2021-12-04 ASSESSMENT — PAIN DESCRIPTION - FREQUENCY
FREQUENCY: CONTINUOUS

## 2021-12-04 ASSESSMENT — PAIN - FUNCTIONAL ASSESSMENT
PAIN_FUNCTIONAL_ASSESSMENT: ACTIVITIES ARE NOT PREVENTED
PAIN_FUNCTIONAL_ASSESSMENT: PREVENTS OR INTERFERES SOME ACTIVE ACTIVITIES AND ADLS
PAIN_FUNCTIONAL_ASSESSMENT: PREVENTS OR INTERFERES SOME ACTIVE ACTIVITIES AND ADLS

## 2021-12-04 ASSESSMENT — PAIN DESCRIPTION - DESCRIPTORS
DESCRIPTORS: ACHING;DISCOMFORT;SHARP
DESCRIPTORS: ACHING;DISCOMFORT
DESCRIPTORS: ACHING

## 2021-12-05 VITALS
SYSTOLIC BLOOD PRESSURE: 115 MMHG | HEIGHT: 69 IN | DIASTOLIC BLOOD PRESSURE: 53 MMHG | TEMPERATURE: 98.4 F | RESPIRATION RATE: 14 BRPM | HEART RATE: 78 BPM | OXYGEN SATURATION: 96 % | WEIGHT: 293 LBS | BODY MASS INDEX: 43.4 KG/M2

## 2021-12-05 PROCEDURE — 6370000000 HC RX 637 (ALT 250 FOR IP): Performed by: NURSE PRACTITIONER

## 2021-12-05 PROCEDURE — 6360000002 HC RX W HCPCS: Performed by: NURSE PRACTITIONER

## 2021-12-05 PROCEDURE — 94761 N-INVAS EAR/PLS OXIMETRY MLT: CPT

## 2021-12-05 RX ADMIN — METOPROLOL TARTRATE 12.5 MG: 25 TABLET, FILM COATED ORAL at 09:49

## 2021-12-05 RX ADMIN — FAMOTIDINE 20 MG: 20 TABLET ORAL at 09:49

## 2021-12-05 RX ADMIN — OXYCODONE AND ACETAMINOPHEN 1 TABLET: 5; 325 TABLET ORAL at 01:29

## 2021-12-05 RX ADMIN — OXYCODONE AND ACETAMINOPHEN 1 TABLET: 5; 325 TABLET ORAL at 13:47

## 2021-12-05 RX ADMIN — OXYCODONE AND ACETAMINOPHEN 1 TABLET: 5; 325 TABLET ORAL at 07:58

## 2021-12-05 RX ADMIN — ENOXAPARIN SODIUM 40 MG: 100 INJECTION SUBCUTANEOUS at 09:49

## 2021-12-05 RX ADMIN — SENNOSIDES AND DOCUSATE SODIUM 1 TABLET: 50; 8.6 TABLET ORAL at 09:49

## 2021-12-05 RX ADMIN — ASPIRIN 81 MG: 81 TABLET, CHEWABLE ORAL at 09:50

## 2021-12-05 ASSESSMENT — PAIN DESCRIPTION - FREQUENCY
FREQUENCY: CONTINUOUS
FREQUENCY: CONTINUOUS

## 2021-12-05 ASSESSMENT — PAIN SCALES - GENERAL
PAINLEVEL_OUTOF10: 8
PAINLEVEL_OUTOF10: 7
PAINLEVEL_OUTOF10: 6

## 2021-12-05 ASSESSMENT — PAIN DESCRIPTION - DESCRIPTORS
DESCRIPTORS: ACHING
DESCRIPTORS: SHARP

## 2021-12-05 ASSESSMENT — PAIN DESCRIPTION - ONSET
ONSET: ON-GOING
ONSET: ON-GOING

## 2021-12-05 ASSESSMENT — PAIN DESCRIPTION - PROGRESSION: CLINICAL_PROGRESSION: NOT CHANGED

## 2021-12-05 ASSESSMENT — PAIN DESCRIPTION - LOCATION
LOCATION: HIP
LOCATION: HIP

## 2021-12-05 ASSESSMENT — PAIN DESCRIPTION - PAIN TYPE
TYPE: SURGICAL PAIN
TYPE: SURGICAL PAIN

## 2021-12-05 ASSESSMENT — PAIN DESCRIPTION - ORIENTATION
ORIENTATION: RIGHT
ORIENTATION: RIGHT

## 2021-12-05 NOTE — PROGRESS NOTES
ATTENDING PHYSICIAN'S PROGRESS NOTES    Patient:  Ronnell Gonzalez      Unit/Bed:1111/1111-A    YOB: 1970    MRN: [id number]     Acct: [de-identified]     Admit date: 11/30/2021    Patient Seen, Chart, Consults notes, Labs, Radiology studies reviewed. SUBJECTIVE:   Day 5 of stay with chest pain and associated shortness of breath, with CTA of the chest negative for PE and normal troponins; and most recent (in last 24 hours) has had resolution of the chest pain. Patient has blood levels declining further and work-up has shown iron deficiency. She was given 500 mg of IV Venofer and 1 unit of packed red blood cells. Seen by cardiology with recommendation for outpatient stress test. Awaiting H&H for discharge. All other ROS negative except noted in HPI    Past, Family, Social History unchanged from admission. Diet:  ADULT DIET; Regular; Low Fat/Low Chol/High Fiber/TIM; No Added Salt (3-4 gm)    Medications:  Scheduled Meds:   sodium chloride flush  5-40 mL IntraVENous 2 times per day    aspirin  81 mg Oral Daily    atorvastatin  40 mg Oral Nightly    sennosides-docusate sodium  1 tablet Oral BID    nicotine  1 patch TransDERmal Daily    enoxaparin  40 mg SubCUTAneous Daily    metoprolol tartrate  12.5 mg Oral BID    famotidine  20 mg Oral BID     Continuous Infusions:   sodium chloride      sodium chloride 25 mL (12/03/21 1726)     PRN Meds:sodium chloride, budesonide-formoterol, sodium chloride flush, sodium chloride, ondansetron **OR** ondansetron, acetaminophen **OR** acetaminophen, polyethylene glycol, fleet, nitroGLYCERIN, albuterol sulfate HFA, oxyCODONE-acetaminophen    OBJECTIVE:    CBC:   Recent Labs     12/03/21  0641 12/04/21  1801   WBC 7.8 9.2   HGB 7.4* 8.6*    358     BMP:    No results for input(s): NA, K, CL, CO2, BUN, CREATININE, GLUCOSE in the last 72 hours. Calcium:  No results for input(s): CALCIUM in the last 72 hours.   Ionized Calcium:No results for input(s): IONCA in the last 72 hours. Magnesium:  No results for input(s): MG in the last 72 hours. Phosphorus:  No results for input(s): PHOS in the last 72 hours. BNP:No results for input(s): BNP in the last 72 hours. Glucose:No results for input(s): POCGLU in the last 72 hours. HgbA1C: No results for input(s): LABA1C in the last 72 hours. INR:   No results for input(s): INR in the last 72 hours. Hepatic:   No results for input(s): ALKPHOS, ALT, AST, PROT, BILITOT, BILIDIR, LABALBU in the last 72 hours. Amylase and Lipase:No results for input(s): LACTA, AMYLASE in the last 72 hours. Lactic Acid: No results for input(s): LACTA in the last 72 hours. Troponin:   No results for input(s): CKTOTAL, CKMB, TROPONINT in the last 72 hours. BNP: No results for input(s): BNP in the last 72 hours. Lipids:   No results for input(s): CHOL, TRIG, HDL, LDLCALC in the last 72 hours. Invalid input(s): LDL  ABGs: No results found for: PH, PCO2, PO2, HCO3, O2SAT    Radiology reports as per the Radiologist  Radiology: Echocardiogram complete 2D with doppler with color    Result Date: 12/1/2021  Transthoracic Echocardiography Report (TTE)  Demographics   Patient Name       Angeles Marcos      Date of Study       12/01/2021   Date of Birth      1970         Gender              Female   Age                49 year(s)         Race                   Patient Number     [id number]         Room Number         9712   Visit Number       [id number]   Corporate ID       [id number]   Accession Number   [id number]         Mer Lange RN   Ordering Physician Bradford Moore                     CNP                Physician           MD  Procedure Type of Study   TTE procedure:ECHOCARDIOGRAM COMPLETE 2D W DOPPLER W COLOR.   Procedure Date Date: 12/01/2021 Start: 08:10 AM Study Location: [facility] Indications:Chest pain. Patient Status: Routine Height: 69 inches Weight: 280 pounds BSA: 2.38 m2 BMI: 41.35 kg/m2 HR: 82 bpm BP: 103/69 mmHg  Conclusions   Summary  Left ventricular systolic function is normal.  Ejection fraction is visually estimated at 50%. Grade I diastolic dysfunction. No regional wall motion abnormalites. Right heart is moderately dilated (chronic). Moderate tricuspid regurgitation is present. RVSP is 53 mmHg. No evidence of any pericardial effusion. Signature   ------------------------------------------------------------------  Electronically signed by Filipe Frausto MD (Interpreting  physician) on 12/01/2021 at 11:58 AM  ------------------------------------------------------------------   Findings   Left Ventricle  Left ventricular systolic function is normal.  Ejection fraction is visually estimated at 50%. Grade I diastolic dysfunction. No regional wall motion abnormalites. Left Atrium  Essentially normal left atrium. Right Atrium  Moderately dilated right atrium. Right Ventricle  Moderately dilated right ventricle. Aortic Valve  Structurally normal aortic valve. Mitral Valve  Trace mitral regurgitation is present. Tricuspid Valve  Moderate tricuspid regurgitation is present. RVSP is 53 mmHg. Pulmonic Valve  Pulmonic valve is structurally normal.   Pericardial Effusion  No evidence of any pericardial effusion.   M-Mode/2D Measurements & Calculations   LV Diastolic Dimension:   LV Systolic Dimension:   LA Dimension: 4.5 cmAO  4.49 cm                   3.42 cm                  Root Dimension: 3.1 cm  LV FS:23.8 %              LV Volume Diastolic:  LV PW Diastolic: 1 cm     37.9 ml  LV PW Systolic: 7.94 cm   LV Volume Systolic: 40  Septum Diastolic: 1.40 cm ml                       RV Diastolic Dimension:  Septum Systolic: 1.3 cm   LV EDV/LV EDV Index:     4.42 cm  CO: 7.08 l/min            90.5 ml/38 m2LV ESV/LV  CI: 2.97 l/m*m2           ESV Index: 40 ml/17 m2 LA/Aorta: 1.45                            EF Calculated (A4C):                            55.8 %                            EF Calculated (2D): 47.7                            %                             LVOT: 2.4 cm  Doppler Measurements & Calculations   MV Peak E-Wave: 91.3 AV Peak Velocity: 156 cm/s    LVOT Peak Velocity: 90.2  cm/s                 AV Peak Gradient: 9.73 mmHg   cm/s  MV Peak A-Wave: 120  AV Mean Velocity: 111 cm/s    LVOT Mean Velocity: 61.8  cm/s                 AV Mean Gradient: 6 mmHg      cm/s  MV E/A Ratio: 0.76   AV VTI: 27.6 cm               LVOT Peak Gradient: 3  MV Peak Gradient:    AV Area (Continuity):3.13 cm2 mmHgLVOT Mean Gradient: 2  3.33 mmHg                                          mmHg                       LVOT VTI: 19.1 cm             Estimated RVSP: 53 mmHg  MV P1/2t: 55 msec                                  Estimated RAP:3 mmHg  MVA by PHT:4 cm2     Estimated PASP: 52.56 mmHg                                                      TR Velocity:352 cm/s  MV E' Lateral                                      TR Gradient:49.56 mmHg  Velocity: 10.7 cm/s   MV E/E' lateral:  8.53      XR CHEST PORTABLE    Result Date: 11/30/2021  EXAMINATION: ONE XRAY VIEW OF THE CHEST 11/30/2021 3:30 pm COMPARISON: Chest 06/28/2020 HISTORY: ORDERING SYSTEM PROVIDED HISTORY: Chest pain Acuity: Acute Type of Exam: Initial FINDINGS: The cardiac silhouette is enlarged. Small to moderate size hiatal hernia evident. The mediastinal and hilar silhouettes appear otherwise unremarkable. The lungs appear clear. No pleural effusion. No pneumothorax is seen. No acute osseous abnormality is identified. 1. No radiographic evidence of acute pulmonary disease. 2. Cardiomegaly. 3. Small to moderate size hiatal hernia.      CTA PULMONARY W CONTRAST    Result Date: 11/30/2021  EXAMINATION: CTA OF THE CHEST 11/30/2021 5:06 pm TECHNIQUE: CTA of the chest was performed after the administration of intravenous contrast. Multiplanar reformatted images are provided for review. MIP images are provided for review. Dose modulation, iterative reconstruction, and/or weight based adjustment of the mA/kV was utilized to reduce the radiation dose to as low as reasonably achievable. 90 ml isovue 370 used COMPARISON: CTA chest 06/29/2020 HISTORY: ORDERING SYSTEM PROVIDED HISTORY: SOB. Rt. hip replacement yesterday. Decision Support Exception - unselect if not a suspected or confirmed emergency medical condition->Emergency Medical Condition (MA) Reason for Exam: SOB. Rt. hip replacement yesterday. Acuity: Acute Type of Exam: Initial Additional signs and symptoms: no Relevant Medical/Surgical History: FINDINGS: Pulmonary Arteries: Pulmonary arteries are adequately opacified for evaluation. No evidence of intraluminal filling defect to suggest pulmonary embolism. Main pulmonary artery is 38 mm diameter. Mediastinum: No evidence of mediastinal lymphadenopathy. Small hiatal hernia. The heart is mildly enlarged. The pericardium demonstrates no acute abnormality. There is no acute abnormality of the thoracic aorta. The ascending thoracic aorta is 30 mm and descending thoracic aorta 27 mm. Lungs/pleura: The lungs are without acute process. Focal linear band of subsegmental atelectasis lateral inferior lingula left upper lobe. Subtle areas of air trapping predominate mid and upper lungs typical of chronic smoking sequela (chronic bronchiolitis). No other focal consolidation or pulmonary edema. No evidence of pleural effusion or pneumothorax. Upper Abdomen: Hepatic steatosis. Status post cholecystectomy. Limited images of the upper abdomen are otherwise unremarkable. Soft Tissues/Bones: No acute bone or soft tissue abnormality. 1. No acute pulmonary embolism. 2. No acute pulmonary disease. 3. Dilated main pulmonary artery can be seen with pulmonary hypertension but is nonspecific.  4. Mild chronic pulmonary changes typical bronchiolitis related to smoking, possible early COPD. Correlate with pulmonary function tests. 5. Cardiomegaly. 6. Small hiatal hernia. 7. Hepatic steatosis. Physical Exam:  Vitals: BP (!) 137/40   Pulse 74   Temp 98.6 °F (37 °C) (Oral)   Resp 18   Ht 5' 9\" (1.753 m)   Wt 295 lb 6.7 oz (134 kg)   LMP 06/13/2018   SpO2 98%   BMI 43.63 kg/m²   24 hour intake/output:    Intake/Output Summary (Last 24 hours) at 12/5/2021 0840  Last data filed at 12/5/2021 0127  Gross per 24 hour   Intake 240 ml   Output 150 ml   Net 90 ml     Last 3 weights:   Wt Readings from Last 3 Encounters:   12/05/21 295 lb 6.7 oz (134 kg)   11/12/20 260 lb (117.9 kg)   10/06/20 260 lb (117.9 kg)       General appearance - alert, well appearing, and in no distress, oriented to person, place, and time and in mild to moderate distress  HEENT: Normocephalic, Atraumatic, Conjuctiva pink, PERRL, Oral mucosa normal, Lips, teeth and gums normal, Trachea midline, Thyroid normal and No noted lymphadenopathy  Chest - clear to auscultation, no wheezes, rales or rhonchi, symmetric air entry  Cardiovascular - normal rate, regular rhythm, normal S1, S2, no murmurs, rubs, clicks or gallops  Abdomen - soft, nontender, nondistended, no masses or organomegaly   Neurological - Alert and oriented, Normal speech, No focal findings or movement disorder noted and Motor and sensory grossly normal bilaterally  Integumentary - Skin color, texture, turgor normal. No Rashes or lesions  Musculoskeletal -Full ROM times 4 extremities, No clubbing or cyanosis and No peripheral edema      DVT prophylaxis: [x] Lovenox                                 [] SCDs                                 [] SQ Heparin                                 [] Encourage ambulation           [] Already on Anticoagulation                 ASSESSMENT / PLAN :    Principal Problem:    Chest pain  Currently chest pain-free, patient seen by cardiology consult with recommendation for stress test on the outpatient. 2D echocardiogram results noted. Postop anemia  Scheduled for 1 unit of packed red blood cell and 500 mg IV Venofer for the underlying iron deficiency. Active Problems:    Coronary artery disease involving native coronary artery of native heart with angina pectoris [facility])  Patient had a recent STEMI with PCI, and presenting with chest pain. She admits to being compliant with her medication regimen. Input by cardiology consult noted and appreciated. Mixed hyperlipidemia  Continue with statin as ordered, follow-up on lipid panel. Primary osteoarthritis of right hip  Status post total hip arthroplasty by Dr. Siobhan Parr, orders placed for PT/OT. Optimize pain control. Resolved Problems:    * No resolved hospital problems. *    Awaiting H&H for discharge, already called and notified the lab to draw the sample.     Electronically signed by Lilian Ramos MD on 12/4/2021 at 04:42 pm    Rounding Hospitalist

## 2021-12-05 NOTE — DISCHARGE SUMMARY
HOSPITALIST DISCHARGE SUMMARY     Patient ID: Leigh Cornelius 1970                 [id number]      PCP:  Ha Ni MD    Admit date: 11/30/2021   Discharge date: 12/5/2021      Admitting Physician: Abigail Roque MD  Attending Physician(s): Griselda Smith MD, MD;    Discharge Physician: Griselda Smith MD, MD.    Consultant(s):   IP CONSULT TO ORTHOPEDIC SURGERY  IP CONSULT TO HOSPITALIST  IP CONSULT TO CARDIOLOGY  IP CONSULT TO HOME CARE NEEDS    Admitting Diagnoses: Unstable angina  Discharge Diagnoses: Principal Problem:    Chest pain  Active Problems:    Coronary artery disease involving native coronary artery of native heart with angina pectoris ([address]    Mixed hyperlipidemia    Primary osteoarthritis of right hip  Resolved Problems:    * No resolved hospital problems. *    Discharged Condition: stable  Disposition: home    Procedures: None  Complications: Not applicable    Brief History: Leigh Cornelius is a 49 y.o.  female  who presents with chest pain post right hip replacement. The patient was in the process of getting discharged from the outpatient surgery center after receiving a joint replacement yesterday and started complaining of chest pressure that was nonradiating. Also endorses jaw pain and clammy sensation and nausea. Pain was nonexertional and nonpleuritic. An EKG was obtained at the outside facility and there is some concern for a STEMI per the computer read therefore the patient was sent to the ED for further evaluation. Patient's pain resolved after receiving aspirin and nitroglycerin. Hospital Course: Admitted as above, found to have elevated troponin, seen by cardiology consult, and had been scheduled for stress echo. However patient was found to be anemic and therefore procedure was canceled. Cardiology recommended outpatient stress echo.   Patient was found to be profoundly iron deficient, was given IV Venofer and is being discharged on p.o. ferrous sulfate and ascorbic acid and would also be giving Senokot-S as needed for constipation. She was seen by PT/OT while in-house due to pain in the recently replaced right hip. She is being discharged home in a stable state.     Prognosis: Good    Physical Examination:   General appearance - alert, well appearing, and in no distress, oriented to person, place, and time and in mild to moderate distress  HEENT: Normocephalic, Atraumatic, Conjuctiva pink, PERRL, Oral mucosa normal, Lips, teeth and gums normal, Trachea midline, Thyroid normal and No noted lymphadenopathy  Chest - clear to auscultation, no wheezes, rales or rhonchi, symmetric air entry  Cardiovascular - normal rate, regular rhythm, normal S1, S2, no murmurs, rubs, clicks or gallops  Abdomen - soft, nontender, nondistended, no masses or organomegaly   Neurological - Alert and oriented, Normal speech, No focal findings or movement disorder noted and Motor and sensory grossly normal bilaterally  Integumentary - Skin color, texture, turgor normal. No Rashes or lesions  Musculoskeletal -Full ROM times 4 extremities, No clubbing or cyanosis and No peripheral edema    Significant Diagnostic Studies: None  Pending Investigation/labs: None    Recent Labs:  CBC with Differential:    Lab Results   Component Value Date    WBC 9.2 12/04/2021    RBC 3.19 12/04/2021    HGB 8.6 12/04/2021    HCT 27.9 12/04/2021     12/04/2021    MCV 87.5 12/04/2021    MCH 27.0 12/04/2021    MCHC 30.8 12/04/2021    RDW 15.9 12/04/2021    SEGSPCT 60.3 12/04/2021    LYMPHOPCT 23.2 12/04/2021    MONOPCT 11.9 12/04/2021    BASOPCT 0.8 12/04/2021    MONOSABS 1.1 12/04/2021    LYMPHSABS 2.1 12/04/2021    EOSABS 0.3 12/04/2021    BASOSABS 0.1 12/04/2021    DIFFTYPE AUTOMATED DIFFERENTIAL 12/04/2021     CMP:    Lab Results   Component Value Date     12/01/2021     12/01/2021    K 4.2 12/01/2021    K 4.3 12/01/2021     12/01/2021     12/01/2021    CO2 25 12/01/2021    CO2 22 12/01/2021 BUN 17 12/01/2021    BUN 16 12/01/2021    CREATININE 0.6 12/01/2021    CREATININE 0.7 12/01/2021    GFRAA >60 12/01/2021    GFRAA >60 12/01/2021    LABGLOM >60 12/01/2021    LABGLOM >60 12/01/2021    GLUCOSE 99 12/01/2021    GLUCOSE 102 12/01/2021    PROT 5.3 12/01/2021    LABALBU 3.5 12/01/2021    LABALBU 3.5 12/01/2021    CALCIUM 8.2 12/01/2021    CALCIUM 8.5 12/01/2021    BILITOT 0.2 12/01/2021    ALKPHOS 64 12/01/2021    AST 18 12/01/2021    ALT 17 12/01/2021     Magnesium:    Lab Results   Component Value Date    MG 1.8 12/01/2021     Phosphorus:    Lab Results   Component Value Date    PHOS 2.9 12/01/2021       Patient Instructions  Medications:     Medication List      START taking these medications    ascorbic acid 500 MG tablet  Commonly known as: VITAMIN C  Take 1 tablet by mouth 2 times daily     atorvastatin 40 MG tablet  Commonly known as: LIPITOR  Take 1 tablet by mouth nightly     ferrous sulfate 325 (65 Fe) MG tablet  Commonly known as: IRON 325  Take 1 tablet by mouth 2 times daily     oxyCODONE-acetaminophen 5-325 MG per tablet  Commonly known as: PERCOCET  Take 1 tablet by mouth every 6 hours as needed for Pain for up to 7 days.      sennosides-docusate sodium 8.6-50 MG tablet  Commonly known as: SENOKOT-S  Take 2 tablets by mouth 2 times daily as needed for Constipation        CONTINUE taking these medications    albuterol sulfate  (90 Base) MCG/ACT inhaler  Commonly known as: Proventil HFA  Inhale 2 puffs into the lungs every 6 hours as needed for Wheezing     aspirin EC 81 MG EC tablet  Take 1 tablet by mouth daily for 90 doses     fluticasone-umeclidin-vilant 100-62.5-25 MCG/INH Aepb  Commonly known as: TRELEGY ELLIPTA  Inhale 1 puff into the lungs daily     metoprolol tartrate 25 MG tablet  Commonly known as: LOPRESSOR  Take 0.5 tablets by mouth 2 times daily     Spacer/Aero-[employer]olding [employer]  1 Device by Does not apply route daily as needed (use with MDI inhaler)           Where to Get Your Medications      You can get these medications from any pharmacy    Bring a paper prescription for each of these medications  · ascorbic acid 500 MG tablet  · atorvastatin 40 MG tablet  · ferrous sulfate 325 (65 Fe) MG tablet  · oxyCODONE-acetaminophen 5-325 MG per tablet  · sennosides-docusate sodium 8.6-50 MG tablet         Activity: activity as tolerated  Diet: cardiac diet  Wound Care: keep wound clean and dry and as directed    Follow-up with:   [address], [address] \[facility] [place] [facility]\""vanda Shen MD  [address] [place] [address]  [phone]    In 1 month      Kenyetta Long MD  [address]  [phone]    In 3 days      Services:    To be provided by the [address] [place] [address] upon discharge    Electronically Signed by: Roby Hurley MD, MD.    Time spent on discharge/dictation: 35 minutes

## 2022-10-21 ENCOUNTER — APPOINTMENT (OUTPATIENT)
Dept: NUCLEAR MEDICINE | Age: 52
End: 2022-10-21
Payer: COMMERCIAL

## 2022-10-21 ENCOUNTER — APPOINTMENT (OUTPATIENT)
Dept: GENERAL RADIOLOGY | Age: 52
End: 2022-10-21
Payer: COMMERCIAL

## 2022-10-21 ENCOUNTER — HOSPITAL ENCOUNTER (OUTPATIENT)
Age: 52
Setting detail: OBSERVATION
Discharge: HOME | End: 2022-10-21
Attending: EMERGENCY MEDICINE | Admitting: STUDENT IN AN ORGANIZED HEALTH CARE EDUCATION/TRAINING PROGRAM
Payer: COMMERCIAL

## 2022-10-21 VITALS
BODY MASS INDEX: 41.99 KG/M2 | TEMPERATURE: 97.7 F | WEIGHT: 283.5 LBS | RESPIRATION RATE: 10 BRPM | SYSTOLIC BLOOD PRESSURE: 122 MMHG | HEIGHT: 69 IN | HEART RATE: 63 BPM | DIASTOLIC BLOOD PRESSURE: 79 MMHG | OXYGEN SATURATION: 98 %

## 2022-10-21 DIAGNOSIS — R07.9 CHEST PAIN, UNSPECIFIED TYPE: Primary | ICD-10-CM

## 2022-10-21 DIAGNOSIS — R06.02 SOBOE (SHORTNESS OF BREATH ON EXERTION): ICD-10-CM

## 2022-10-21 DIAGNOSIS — J44.9 CHRONIC OBSTRUCTIVE PULMONARY DISEASE, UNSPECIFIED COPD TYPE (HCC): ICD-10-CM

## 2022-10-21 LAB
ANION GAP SERPL CALCULATED.3IONS-SCNC: 15 MMOL/L (ref 4–16)
BASOPHILS ABSOLUTE: 0.1 K/CU MM
BASOPHILS RELATIVE PERCENT: 0.8 % (ref 0–1)
BUN BLDV-MCNC: 15 MG/DL (ref 6–23)
CALCIUM SERPL-MCNC: 9.8 MG/DL (ref 8.3–10.6)
CHLORIDE BLD-SCNC: 100 MMOL/L (ref 99–110)
CO2: 21 MMOL/L (ref 21–32)
CREAT SERPL-MCNC: 1.3 MG/DL (ref 0.6–1.1)
DIFFERENTIAL TYPE: ABNORMAL
EOSINOPHILS ABSOLUTE: 0.2 K/CU MM
EOSINOPHILS RELATIVE PERCENT: 1.4 % (ref 0–3)
GFR SERPL CREATININE-BSD FRML MDRD: 50 ML/MIN/1.73M2
GLUCOSE BLD-MCNC: 165 MG/DL (ref 70–99)
HCT VFR BLD CALC: 37.5 % (ref 37–47)
HEMOGLOBIN: 11.7 GM/DL (ref 12.5–16)
IMMATURE NEUTROPHIL %: 0.4 % (ref 0–0.43)
LV EF: 53 %
LV EF: 66 %
LVEF MODALITY: NORMAL
LVEF MODALITY: NORMAL
LYMPHOCYTES ABSOLUTE: 4.6 K/CU MM
LYMPHOCYTES RELATIVE PERCENT: 44.4 % (ref 24–44)
MCH RBC QN AUTO: 26.8 PG (ref 27–31)
MCHC RBC AUTO-ENTMCNC: 31.2 % (ref 32–36)
MCV RBC AUTO: 85.8 FL (ref 78–100)
MONOCYTES ABSOLUTE: 0.6 K/CU MM
MONOCYTES RELATIVE PERCENT: 5.7 % (ref 0–4)
NUCLEATED RBC %: 0 %
PDW BLD-RTO: 15.9 % (ref 11.7–14.9)
PLATELET # BLD: 379 K/CU MM (ref 140–440)
PMV BLD AUTO: 8.8 FL (ref 7.5–11.1)
POTASSIUM SERPL-SCNC: 4 MMOL/L (ref 3.5–5.1)
RBC # BLD: 4.37 M/CU MM (ref 4.2–5.4)
SEGMENTED NEUTROPHILS ABSOLUTE COUNT: 4.9 K/CU MM
SEGMENTED NEUTROPHILS RELATIVE PERCENT: 47.3 % (ref 36–66)
SODIUM BLD-SCNC: 136 MMOL/L (ref 136–145)
TOTAL IMMATURE NEUTOROPHIL: 0.04 K/CU MM
TOTAL NUCLEATED RBC: 0 K/CU MM
TROPONIN T: 0.01 NG/ML
TROPONIN T: <0.01 NG/ML
TROPONIN T: <0.01 NG/ML
WBC # BLD: 10.4 K/CU MM (ref 4–10.5)

## 2022-10-21 PROCEDURE — 96360 HYDRATION IV INFUSION INIT: CPT

## 2022-10-21 PROCEDURE — 3430000000 HC RX DIAGNOSTIC RADIOPHARMACEUTICAL: Performed by: INTERNAL MEDICINE

## 2022-10-21 PROCEDURE — 71045 X-RAY EXAM CHEST 1 VIEW: CPT

## 2022-10-21 PROCEDURE — 6360000002 HC RX W HCPCS: Performed by: STUDENT IN AN ORGANIZED HEALTH CARE EDUCATION/TRAINING PROGRAM

## 2022-10-21 PROCEDURE — 6370000000 HC RX 637 (ALT 250 FOR IP): Performed by: STUDENT IN AN ORGANIZED HEALTH CARE EDUCATION/TRAINING PROGRAM

## 2022-10-21 PROCEDURE — G0378 HOSPITAL OBSERVATION PER HR: HCPCS

## 2022-10-21 PROCEDURE — 96361 HYDRATE IV INFUSION ADD-ON: CPT

## 2022-10-21 PROCEDURE — 6360000002 HC RX W HCPCS: Performed by: INTERNAL MEDICINE

## 2022-10-21 PROCEDURE — 99215 OFFICE O/P EST HI 40 MIN: CPT | Performed by: INTERNAL MEDICINE

## 2022-10-21 PROCEDURE — A9500 TC99M SESTAMIBI: HCPCS | Performed by: INTERNAL MEDICINE

## 2022-10-21 PROCEDURE — 2580000003 HC RX 258: Performed by: STUDENT IN AN ORGANIZED HEALTH CARE EDUCATION/TRAINING PROGRAM

## 2022-10-21 PROCEDURE — 84484 ASSAY OF TROPONIN QUANT: CPT

## 2022-10-21 PROCEDURE — 93005 ELECTROCARDIOGRAM TRACING: CPT | Performed by: EMERGENCY MEDICINE

## 2022-10-21 PROCEDURE — 99285 EMERGENCY DEPT VISIT HI MDM: CPT

## 2022-10-21 PROCEDURE — 78452 HT MUSCLE IMAGE SPECT MULT: CPT

## 2022-10-21 PROCEDURE — 85025 COMPLETE CBC W/AUTO DIFF WBC: CPT

## 2022-10-21 PROCEDURE — 36415 COLL VENOUS BLD VENIPUNCTURE: CPT

## 2022-10-21 PROCEDURE — 96372 THER/PROPH/DIAG INJ SC/IM: CPT

## 2022-10-21 PROCEDURE — 80048 BASIC METABOLIC PNL TOTAL CA: CPT

## 2022-10-21 PROCEDURE — 93017 CV STRESS TEST TRACING ONLY: CPT

## 2022-10-21 PROCEDURE — 93306 TTE W/DOPPLER COMPLETE: CPT

## 2022-10-21 RX ORDER — METOPROLOL TARTRATE 50 MG/1
25 TABLET, FILM COATED ORAL 2 TIMES DAILY
Qty: 60 TABLET | Refills: 3 | Status: SHIPPED | OUTPATIENT
Start: 2022-10-21 | End: 2022-10-22 | Stop reason: SDUPTHER

## 2022-10-21 RX ORDER — ATORVASTATIN CALCIUM 40 MG/1
40 TABLET, FILM COATED ORAL NIGHTLY
Status: DISCONTINUED | OUTPATIENT
Start: 2022-10-21 | End: 2022-10-21 | Stop reason: HOSPADM

## 2022-10-21 RX ORDER — SODIUM CHLORIDE 9 MG/ML
INJECTION, SOLUTION INTRAVENOUS PRN
Status: DISCONTINUED | OUTPATIENT
Start: 2022-10-21 | End: 2022-10-21 | Stop reason: HOSPADM

## 2022-10-21 RX ORDER — ONDANSETRON 4 MG/1
4 TABLET, ORALLY DISINTEGRATING ORAL EVERY 8 HOURS PRN
Status: DISCONTINUED | OUTPATIENT
Start: 2022-10-21 | End: 2022-10-21 | Stop reason: HOSPADM

## 2022-10-21 RX ORDER — ONDANSETRON 2 MG/ML
4 INJECTION INTRAMUSCULAR; INTRAVENOUS ONCE
Status: DISCONTINUED | OUTPATIENT
Start: 2022-10-21 | End: 2022-10-21 | Stop reason: HOSPADM

## 2022-10-21 RX ORDER — POLYETHYLENE GLYCOL 3350 17 G/17G
17 POWDER, FOR SOLUTION ORAL DAILY PRN
Status: DISCONTINUED | OUTPATIENT
Start: 2022-10-21 | End: 2022-10-21 | Stop reason: HOSPADM

## 2022-10-21 RX ORDER — ONDANSETRON 2 MG/ML
4 INJECTION INTRAMUSCULAR; INTRAVENOUS EVERY 6 HOURS PRN
Status: DISCONTINUED | OUTPATIENT
Start: 2022-10-21 | End: 2022-10-21 | Stop reason: HOSPADM

## 2022-10-21 RX ORDER — MORPHINE SULFATE 4 MG/ML
4 INJECTION, SOLUTION INTRAMUSCULAR; INTRAVENOUS ONCE
Status: DISCONTINUED | OUTPATIENT
Start: 2022-10-21 | End: 2022-10-21 | Stop reason: HOSPADM

## 2022-10-21 RX ORDER — NITROGLYCERIN 0.4 MG/1
0.4 TABLET SUBLINGUAL EVERY 5 MIN PRN
Status: DISCONTINUED | OUTPATIENT
Start: 2022-10-21 | End: 2022-10-21 | Stop reason: HOSPADM

## 2022-10-21 RX ORDER — ASPIRIN 81 MG/1
81 TABLET ORAL DAILY
Status: DISCONTINUED | OUTPATIENT
Start: 2022-10-21 | End: 2022-10-21 | Stop reason: HOSPADM

## 2022-10-21 RX ORDER — SODIUM CHLORIDE 9 MG/ML
INJECTION, SOLUTION INTRAVENOUS CONTINUOUS
Status: DISCONTINUED | OUTPATIENT
Start: 2022-10-21 | End: 2022-10-21 | Stop reason: HOSPADM

## 2022-10-21 RX ORDER — ASPIRIN 81 MG/1
81 TABLET ORAL DAILY
Qty: 30 TABLET | Refills: 0 | Status: SHIPPED | OUTPATIENT
Start: 2022-10-21 | End: 2022-10-22 | Stop reason: SDUPTHER

## 2022-10-21 RX ORDER — ENOXAPARIN SODIUM 100 MG/ML
30 INJECTION SUBCUTANEOUS 2 TIMES DAILY
Status: DISCONTINUED | OUTPATIENT
Start: 2022-10-21 | End: 2022-10-21 | Stop reason: HOSPADM

## 2022-10-21 RX ORDER — SODIUM CHLORIDE 0.9 % (FLUSH) 0.9 %
5-40 SYRINGE (ML) INJECTION EVERY 12 HOURS SCHEDULED
Status: DISCONTINUED | OUTPATIENT
Start: 2022-10-21 | End: 2022-10-21 | Stop reason: HOSPADM

## 2022-10-21 RX ORDER — SODIUM CHLORIDE 0.9 % (FLUSH) 0.9 %
5-40 SYRINGE (ML) INJECTION PRN
Status: DISCONTINUED | OUTPATIENT
Start: 2022-10-21 | End: 2022-10-21 | Stop reason: HOSPADM

## 2022-10-21 RX ORDER — ACETAMINOPHEN 325 MG/1
650 TABLET ORAL EVERY 6 HOURS PRN
Status: DISCONTINUED | OUTPATIENT
Start: 2022-10-21 | End: 2022-10-21 | Stop reason: HOSPADM

## 2022-10-21 RX ORDER — ATORVASTATIN CALCIUM 40 MG/1
40 TABLET, FILM COATED ORAL DAILY
Qty: 30 TABLET | Refills: 3 | Status: SHIPPED | OUTPATIENT
Start: 2022-10-21 | End: 2022-10-22 | Stop reason: SDUPTHER

## 2022-10-21 RX ORDER — NITROGLYCERIN 0.3 MG/1
0.3 TABLET SUBLINGUAL EVERY 5 MIN PRN
Qty: 30 TABLET | Refills: 0 | Status: SHIPPED | OUTPATIENT
Start: 2022-10-21 | End: 2022-10-22 | Stop reason: SDUPTHER

## 2022-10-21 RX ORDER — ACETAMINOPHEN 650 MG/1
650 SUPPOSITORY RECTAL EVERY 6 HOURS PRN
Status: DISCONTINUED | OUTPATIENT
Start: 2022-10-21 | End: 2022-10-21 | Stop reason: HOSPADM

## 2022-10-21 RX ADMIN — METOPROLOL TARTRATE 12.5 MG: 25 TABLET, FILM COATED ORAL at 13:34

## 2022-10-21 RX ADMIN — ASPIRIN 81 MG: 81 TABLET, COATED ORAL at 09:10

## 2022-10-21 RX ADMIN — KIT FOR THE PREPARATION OF TECHNETIUM TC99M SESTAMIBI 10 MILLICURIE: 1 INJECTION, POWDER, LYOPHILIZED, FOR SOLUTION PARENTERAL at 09:25

## 2022-10-21 RX ADMIN — KIT FOR THE PREPARATION OF TECHNETIUM TC99M SESTAMIBI 30 MILLICURIE: 1 INJECTION, POWDER, LYOPHILIZED, FOR SOLUTION PARENTERAL at 11:05

## 2022-10-21 RX ADMIN — REGADENOSON 0.4 MG: 0.08 INJECTION, SOLUTION INTRAVENOUS at 11:01

## 2022-10-21 RX ADMIN — ENOXAPARIN SODIUM 30 MG: 100 INJECTION SUBCUTANEOUS at 09:11

## 2022-10-21 RX ADMIN — SODIUM CHLORIDE: 9 INJECTION, SOLUTION INTRAVENOUS at 13:14

## 2022-10-21 ASSESSMENT — ENCOUNTER SYMPTOMS
CHEST TIGHTNESS: 1
BACK PAIN: 0
SINUS PAIN: 0
VOICE CHANGE: 0
ABDOMINAL PAIN: 0
VOMITING: 0
BACK PAIN: 1
COUGH: 0
DIARRHEA: 0
SORE THROAT: 0
RHINORRHEA: 0
EYE PAIN: 0
EYE DISCHARGE: 0
BLOOD IN STOOL: 0
SHORTNESS OF BREATH: 0
NAUSEA: 0
SINUS PRESSURE: 0

## 2022-10-21 ASSESSMENT — PAIN - FUNCTIONAL ASSESSMENT: PAIN_FUNCTIONAL_ASSESSMENT: 0-10

## 2022-10-21 NOTE — PROGRESS NOTES
This nurse went to tell patient that she is being discharged, she states she doesn't feel well and nobody has come to talk to her about her test.  Dr. Samm Dubois paged to come speak to patient.

## 2022-10-21 NOTE — H&P
V2.0  History and Physical      Name:  Maddy Mcallister /Age/Sex: 1970  (47 y.o. female)   MRN & CSN:  [id number] & [id number] Encounter Date/Time: 10/21/2022 5:05 AM EDT   Location:  [place] [address] PCP: Elmo Woods MD       Hospital Day: 1    Assessment and Plan:   Maddy Mcallister is a 47 y.o. female with a pmh of lumbar spinal stenosis, hip osteoarthritis, history of CAD STEMI 3 years ago who presents with Chest pain    Hospital Problems             Last Modified POA    * (Principal) Chest pain 10/21/2022 Yes       Chest pain ACS rule out: Troponins negative. EKG nonspecific. Previous history of STEMI 3 years ago with stents in the % occlusion. Currently on aspirin and statin. Telemetry. Cardiology consult in place. N.p.o.  May consider stress test.  Echocardiogram ordered. Lumbar spinal stenosis  Hip osteoarthritis  History of STEMI with 100% LAD occlusion status post PCI and [place] in 2019 on GDMT    Disposition:   Current Living situation: Home  Expected Disposition: Home  Estimated D/C: 2 days    Diet No diet orders on file   DVT Prophylaxis [x] Lovenox, []  Heparin, [] SCDs, [] Ambulation,  [] Eliquis, [] Xarelto   Code Status Prior   Surrogate Decision Maker/ POA      History from:     patient    History of Present Illness:     Chief Complaint: Chest pain  Maddy Mcallister is a 47 y.o. female with a pmh of lumbar spinal stenosis, hip osteoarthritis, history of CAD STEMI 3 years ago who presents with Chest pain    She started having significant chest pressure sensation that is midsternal, 8/10 in intensity radiating to the left, no alleviating factor initially but later on resolved with rest, also gets resolved with nitro, aggravated by exertion. EMS did EKG that had concerns for STEMI for which STEMI alert was called. Later on the STEMI alert was canceled after discussion with cardiology. EKG showed nonspecific changes in the anterior leads.   The patient received aspirin and 2 doses of nitro by the EMS. The patient also has a history of stenting 3 years ago and states that the pain looks very similar to that. The patient denies any shortness of breath, dizziness, lightheadedness, leg pain, leg swelling, nausea, vomiting, diarrhea, constipation, orthopnea, PND. In the ED, patient was found to be hypertensive and tachycardic. Creatinine of 1.3. Troponin of 0.010. EKG showed sinus tachycardia with no significant ST segment changes, T waves were unremarkable there were isolated PVCs with a QTC of 478. The patient is admitted to the inpatient floor for the management of chest pain ACS rule out. Cardiology consult placed     Review of Systems: Need 10 Elements   Review of Systems   Constitutional:  Negative for appetite change, chills, fever and unexpected weight change. HENT:  Negative for ear pain, hearing loss, sinus pressure, sinus pain and voice change. Eyes:  Negative for pain, discharge and visual disturbance. Respiratory:  Positive for chest tightness. Negative for cough and shortness of breath. Cardiovascular:  Positive for chest pain and palpitations. Negative for leg swelling. Gastrointestinal:  Negative for abdominal pain, blood in stool, diarrhea, nausea and vomiting. Genitourinary:  Negative for dysuria and frequency. Musculoskeletal:  Positive for arthralgias, back pain and gait problem. Neurological:  Negative for dizziness, weakness, light-headedness and headaches. Psychiatric/Behavioral:  Negative for sleep disturbance. Objective:   No intake or output data in the 24 hours ending 10/21/22 0505   Vitals:   Vitals:    10/21/22 0401 10/21/22 0421 10/21/22 0432 10/21/22 0446   BP: 109/66 (!) 100/59 (!) 101/59 107/65   Pulse: 76 70 72 69   Resp: 13 14 16 (!) 8   Temp:       TempSrc:       SpO2: 94% 95% 94% 97%       Medications Prior to Admission     Prior to Admission medications    Medication Sig Start Date End Date Taking?  Authorizing Provider atorvastatin (LIPITOR) 40 MG tablet Take 1 tablet by mouth nightly 12/4/21   Ulysses Mead, MD   sennosides-docusate sodium (SENOKOT-S) 8.6-50 MG tablet Take 2 tablets by mouth 2 times daily as needed for Constipation 12/4/21   Ulysses Mead, MD   ferrous sulfate (IRON 325) 325 (65 Fe) MG tablet Take 1 tablet by mouth 2 times daily 12/4/21   Ulysses Mead, MD   ascorbic acid (VITAMIN C) 500 MG tablet Take 1 tablet by mouth 2 times daily 12/4/21   Ulysses Mead, MD   fluticasone-umeclidin-vilant (TRELEGY ELLIPTA) 338-54.6-01 MCG/INH AEPB Inhale 1 puff into the lungs daily 10/6/20   HUMZA Kimble CNP   albuterol sulfate HFA (PROVENTIL HFA) 108 (90 Base) MCG/ACT inhaler Inhale 2 puffs into the lungs every 6 hours as needed for Wheezing 10/6/20 11/5/20  HUMZA Kimble CNP   Spacer/Aero-Holding Kelly Peter 1 Device by Does not apply route daily as needed (use with MDI inhaler) 10/6/20   HUMZA Kimble CNP   metoprolol tartrate (LOPRESSOR) 25 MG tablet Take 0.5 tablets by mouth 2 times daily 7/23/20   HUMZA Leal CNP   aspirin 81 MG EC tablet Take 1 tablet by mouth daily for 90 doses 6/14/19 7/23/20  Lj Solares MD       Physical Exam: Need 8 Elements   Physical Exam  Vitals reviewed. Constitutional:       Appearance: Normal appearance. She is obese. HENT:      Head: Normocephalic. Nose: Nose normal.      Mouth/Throat:      Mouth: Mucous membranes are moist.   Eyes:      Conjunctiva/sclera: Conjunctivae normal.      Pupils: Pupils are equal, round, and reactive to light. Cardiovascular:      Rate and Rhythm: Regular rhythm. Tachycardia present. Pulses: Normal pulses. Heart sounds: Normal heart sounds. No murmur heard. Pulmonary:      Effort: Pulmonary effort is normal.      Breath sounds: Normal breath sounds. No wheezing, rhonchi or rales. Abdominal:      General: Abdomen is flat. Bowel sounds are normal. There is distension. Palpations: Abdomen is soft. Tenderness: There is no abdominal tenderness. Musculoskeletal:         General: No deformity. Normal range of motion. Cervical back: Normal range of motion and neck supple. Right lower leg: No edema. Left lower leg: No edema. Skin:     Coloration: Skin is not jaundiced or pale. Neurological:      General: No focal deficit present. Mental Status: She is alert and oriented to person, place, and time. Mental status is at baseline. Past History:   PMHx   Past Medical History:   Diagnosis Date    CAD (coronary artery disease)     MRSA infection         PSHX:  has a past surgical history that includes Cholecystectomy (1992); skin biopsy; Coronary angioplasty with stent; back surgery; and joint replacement (Right). Fam HX: family history includes Arthritis in her sister; Asthma in her sister; Coronary Art Dis in her father; Diabetes in her father; Heart Disease in her father; High Blood Pressure in her father; High Cholesterol in her father. Soc HX:   Social History     Socioeconomic History    Marital status: Legally      Spouse name: Adina Woods    Number of children: 1    Years of education: 12   Tobacco Use    Smoking status: Every Day     Packs/day: 0.50     Types: Cigarettes    Smokeless tobacco: Never    Tobacco comments:     cut back 3 months ago to 1/2 pack from 2 packs a day   Vaping Use    Vaping Use: Never used   Substance and Sexual Activity    Alcohol use: Not Currently     Comment: occasional    Drug use: Yes     Frequency: 2.0 times per week     Types: Marijuana (Weed)     Comment: none since heart attack     Sexual activity: Not Currently       Allergies:    Allergies: No Known Allergies    Medications:   Medications:    morphine  4 mg IntraVENous Once    ondansetron  4 mg IntraVENous Once      Infusions:   PRN Meds:      Labs      CBC:   Recent Labs     10/21/22  0151   WBC 10.4   HGB 11.7*        BMP:    Recent Labs     10/21/22  0151      K 4.0      CO2 21   BUN 15   CREATININE 1.3*   GLUCOSE 165*     Hepatic: No results for input(s): AST, ALT, ALB, BILITOT, ALKPHOS in the last 72 hours. Lipids:   Lab Results   Component Value Date/Time    CHOL 147 12/01/2021 09:59 AM    HDL 43 12/01/2021 09:59 AM    TRIG 136 12/01/2021 09:59 AM     Hemoglobin A1C:   Lab Results   Component Value Date/Time    LABA1C 5.6 02/13/2020 02:40 PM     TSH:   Lab Results   Component Value Date/Time    TSH 2.01 02/13/2020 02:40 PM     Troponin:   Lab Results   Component Value Date/Time    TROPONINT 0.010 10/21/2022 01:51 AM    TROPONINT <0.010 11/30/2021 10:37 PM    TROPONINT <0.010 11/30/2021 10:11 PM     Lactic Acid: No results for input(s): LACTA in the last 72 hours. BNP: No results for input(s): PROBNP in the last 72 hours. UA:No results found for: Mecca Poncho, PHUR, LABCAST, WBCUA, RBCUA, MUCUS, TRICHOMONAS, YEAST, BACTERIA, CLARITYU, SPECGRAV, LEUKOCYTESUR, UROBILINOGEN, BILIRUBINUR, BLOODU, GLUCOSEU, KETUA, AMORPHOUS  Urine Cultures: No results found for: LABURIN  Blood Cultures: No results found for: BC  No results found for: BLOODCULT2  Organism: No results found for: ORG    Imaging/Diagnostics Last 24 Hours   No results found.     Personally reviewed Lab Studies, Imaging, and discussed case with Dr. Randy Roblero     Electronically signed by Randy Roblero MD, MD on 10/21/2022 at 5:05 AM

## 2022-10-21 NOTE — DISCHARGE SUMMARY
V2.0  Discharge Summary    Name:  Echo Yu /Age/Sex: 1970 (47 y.o. female)   Admit Date: 10/21/2022  Discharge Date: 10/21/22    MRN & CSN:  [id number] & [id number] Encounter Date and Time 10/21/22 3:25 PM EDT    Attending:  Jermaine Hou, Piedad Discharging Provider: Jermaine Hou MD       Hospital Course:     Brief HPI: Echo Yu is a 47 y.o. female who presented with pmh of lumbar spinal stenosis, hip osteoarthritis, history of CAD STEMI 3 years ago who presents with Chest pain    Brief Problem Based Course:     Chest pain likely stable angina: Troponin negative. EKG nonspecific. Chest xray normal. Previous history of STEMI 3 years ago with stents in the % occlusion. Currently on aspirin, statin and beta blocker but has been very non complaint with the medication. Telemetry. Cardiology consulted. Stress test showed abnormal result but the changes was due to existing CAD. Discussed with cardiology, ok for DC. Patient counseled to take her medication. Patient was told the Risk of getting recurrent MI along with death with medication non compliance is very high. Patient verbalized understanding. Lumbar spinal stenosis - stable  Hip osteoarthritis  History of STEMI with 100% LAD occlusion status post PCI and [place] in 2019 on GDMT      The patient expressed appropriate understanding of, and agreement with the discharge recommendations, medications, and plan.      Consults this admission:  IP CONSULT TO HOSPITALIST  IP CONSULT TO CARDIOLOGY    Discharge Diagnosis:   Chest pain        Discharge Instruction:   Follow up appointments: Cardiology  Primary care physician: Francesco Allison MD within 2 weeks  Diet: cardiac diet   Activity: activity as tolerated  Disposition: Discharged to:   [x]Home, []HHC, []SNF, []Acute Rehab, []Hospice   Condition on discharge: Stable  Labs and Tests to be Followed up as an outpatient by PCP or Specialist:     Discharge Medications: Medication List        START taking these medications      nitroGLYCERIN 0.3 MG SL tablet  Commonly known as: Nitrostat  Place 1 tablet under the tongue every 5 minutes as needed for Chest pain up to max of 3 total doses. If no relief after 1 dose, call 911.             CHANGE how you take these medications      atorvastatin 40 MG tablet  Commonly known as: Lipitor  Take 1 tablet by mouth daily  What changed: when to take this     metoprolol tartrate 50 MG tablet  Commonly known as: Lopressor  Take 0.5 tablets by mouth 2 times daily  What changed:   medication strength  how much to take            CONTINUE taking these medications      ascorbic acid 500 MG tablet  Commonly known as: VITAMIN C  Take 1 tablet by mouth 2 times daily     aspirin EC 81 MG EC tablet  Take 1 tablet by mouth daily     ferrous sulfate 325 (65 Fe) MG tablet  Commonly known as: IRON 325  Take 1 tablet by mouth 2 times daily     fluticasone-umeclidin-vilant 100-62.5-25 MCG/INH Aepb  Commonly known as: TRELEGY ELLIPTA  Inhale 1 puff into the lungs daily     sennosides-docusate sodium 8.6-50 MG tablet  Commonly known as: SENOKOT-S  Take 2 tablets by mouth 2 times daily as needed for Constipation     Spacer/Aero-Holding Thomasena Norris  1 Device by Does not apply route daily as needed (use with MDI inhaler)            STOP taking these medications      albuterol sulfate  (90 Base) MCG/ACT inhaler  Commonly known as: Proventil HFA               Where to Get Your Medications        These medications were sent to [facility]/pharmacy #[id number] [address] - P [phone] - F [phone]  Critical access hospital [place], [facility] [address]      Phone: [phone]   aspirin EC 81 MG EC tablet  atorvastatin 40 MG tablet  metoprolol tartrate 50 MG tablet  nitroGLYCERIN 0.3 MG SL tablet        Objective Findings at Discharge:   /79   Pulse 63   Temp 97.7 °F (36.5 °C) (Oral)   Resp 10   Ht 5' 9\" (1.753 m)   Wt 283 lb 8 oz (128.6 kg)   LMP 06/13/2018   SpO2 98%   BMI 41.87 kg/m²       Physical Exam:   General: NAD  Eyes: EOMI  ENT: neck supple  Cardiovascular: Regular rate. Respiratory: Clear to auscultation  Gastrointestinal: Soft, non tender  Genitourinary: no suprapubic tenderness  Musculoskeletal: No edema  Skin: warm, dry  Neuro: Alert. Psych: Mood appropriate. Labs and Imaging   XR CHEST PORTABLE    Result Date: 10/21/2022  EXAMINATION: PORTABLE CHEST X-RAY October 1, 2022 TECHNIQUE: Portable chest COMPARISON: None HISTORY: Shortness of breath. FINDINGS: No lines or tubes. Mild enlargement of the cardiomediastinal silhouette. The lungs are clear without focal consolidation or pleural effusion. No suspicious pulmonary nodules. No pulmonary edema. No pneumothorax. No acute osseous abnormality. 1. No acute cardiopulmonary disease. 2. Mild cardiomegaly. NM MYOCARDIAL SPECT REST EXERCISE OR RX    Result Date: 10/21/2022  Cardiac Perfusion Imaging   Demographics   Patient Name      Constance      Date of study        10/21/2022   Date of Birth     1970         Gender               Female   Age               47 year(s)         Race                    Patient Number    [id number]         Room Number          8067   Visit Number      [id number]          Height               69 inches   Corporate ID      [id number]           Weight               283 pounds   Accession Number  [id number]                                        NM Technologist      [facility]   Ordering          Gaile Chroman   Interpreting         Gaile Chroman  Physician         Sanya Presley MD      Cardiologist         Sanya Presley MD   Conclusions   Summary  abnormal stress test, Myocardial perfusion scan shows small size, moderate  intensity,non reversible perfusion defect in inferoapical wall. 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 Risk Factors   The patient risk factors include:prior PCI;obesity. Stress Protocols   Resting ECG  Normal sinus rhythm. Resting HR:68 bpm  Resting BP:127/79 mmHg  Stress Protocol:Pharmacologic - Lexiscan  Peak HR:88 bpm                               HR/BP product:71868  Peak BP:138/72 mmHg  Predicted HR: 169 bpm  % of predicted HR: 52   Exercise duration: 01:00 min  Reason for termination:Completed   ECG Findings  nsr   Symptoms  abdominal cramping   Stress Interpretation  ECG portion of stress test is negative for ischemia by diagnostic criteria. Procedure Medications   - Lexiscan I.V. bolus (over 15sec.) 0.4 mg admininstered @ 10/21/2022 11:05. Imaging Protocols   Rest                             Stress   Isotope:Sestamibi 99mTc          Isotope: Sestamibi 99mTc  Isotope dose:9.7 mCi             Isotope dose:31 mCi  Administration route: I.V. Administration route: I.V. Injection Date:10/21/2022 09:25  Injection Date:10/21/2022 11:05  Scan Date:10/21/2022 10:10       Scan Date:10/21/2022 11:50   Technique:        SPECT          Technique:        Gated                                                     SPECT   Procedure Description   Upon patient arrival, the patient is identified using two identifiers and  the physician order is verified. An IV is established and 8-11mCi of 99mTc  Sestamibi is intravenously injected and followed with 10mL 0.9% Normal  Saline flush. A circulation period of 45 minutes occurs prior to resting  SPECT imaging.  After imaging is complete the patient is escorted to the  stress lab. The patient is connected to the ECG and blood pressure is  measured. The RN starts the stress portion of the exam and rapidly  intravenously injects Lexiscan (regadenosine) 0.4mg over a period of 10 to15  seconds and follows with 5mL 0.9% Normal Saline flush. Immediately following  the Nuclear Technologist intravenously injects 22-33mCi of 99mTc Sestamibi  and 5mL 0.9% Normal Saline flush. After completion, recovery, and removal of  the IV, the patient rests during the second circulation period of 45  minutes. Final stress SPECT gated imaging is performed. The patient may  return home or to their room after stress imaging. The images are processed  and final charting is completed and sent to the appropriate cardiologist for  interpretation and reporting. Perfusion Interpretation   Myocardial perfusion scan shows small size, moderate intensity,non  reversible perfusion defect in inferoapical wall. Imaging Results    Summed scores     - Summed stress score: 13     - Summed rest score: 0     - Summed difference score:    13   Rest ejection  Ejection fraction:66 %  EDV :79 ml  ESV :27 ml  Stroke volume :52 ml  Medical History   Accession#:  [id number]  Admission Data Admission date: 10/21/2022 Admission Time: 03:39 Hospital Status: Outpatient. CBC:   Recent Labs     10/21/22  0151   WBC 10.4   HGB 11.7*        BMP:    Recent Labs     10/21/22  0151      K 4.0      CO2 21   BUN 15   CREATININE 1.3*   GLUCOSE 165*     Hepatic: No results for input(s): AST, ALT, ALB, BILITOT, ALKPHOS in the last 72 hours.   Lipids:   Lab Results   Component Value Date/Time    CHOL 147 12/01/2021 09:59 AM    HDL 43 12/01/2021 09:59 AM    TRIG 136 12/01/2021 09:59 AM     Hemoglobin A1C:   Lab Results   Component Value Date/Time    LABA1C 5.6 02/13/2020 02:40 PM     TSH:   Lab Results   Component Value Date/Time    TSH 2.01 02/13/2020 02:40 PM     Troponin:   Lab Results   Component Value Date/Time TROPONINT <0.010 10/21/2022 12:55 PM    TROPONINT <0.010 10/21/2022 05:03 AM    TROPONINT 0.010 10/21/2022 01:51 AM     Lactic Acid: No results for input(s): LACTA in the last 72 hours. BNP: No results for input(s): PROBNP in the last 72 hours.   UA:No results found for: Mega Martyr, PHUR, LABCAST, WBCUA, RBCUA, MUCUS, TRICHOMONAS, YEAST, BACTERIA, CLARITYU, SPECGRAV, LEUKOCYTESUR, UROBILINOGEN, BILIRUBINUR, BLOODU, GLUCOSEU, KETUA, AMORPHOUS  Urine Cultures: No results found for: LABURIN  Blood Cultures: No results found for: BC  No results found for: BLOODCULT2  Organism: No results found for: ORG    Time Spent Discharging patient 35 minutes    Electronically signed by Smith Rachel MD on 10/21/2022 at 3:25 PM

## 2022-10-21 NOTE — PROGRESS NOTES
4 Eyes Skin Assessment     NAME:  Gisel Salomon  YOB: 1970  MEDICAL RECORD NUMBER:  [id number]    The patient is being assess for  Admission    I agree that 2 RN's have performed a thorough Head to Toe Skin Assessment on the patient. ALL assessment sites listed below have been assessed. Areas assessed by both nurses:    Head, Face, Ears, Shoulders, Back, Chest, Arms, Elbows, Hands, Sacrum. Buttock, Coccyx, Ischium, and Legs. Feet and Heels        Does the Patient have a Wound?  No noted wound(s)       Eulogio Prevention initiated:  Yes   Wound Care Orders initiated:  No    Pressure Injury (Stage 3,4, Unstageable, DTI, NWPT, and Complex wounds) if present place referral/consult order under [de-identified] No    New and Established Ostomies if present place consult order under : No      Nurse 1 eSignature: Electronically signed by Zander Man LPN on 45/53/03 at 1:92 AM EDT    **SHARE this note so that the co-signing nurse is able to place an eSignature**    Nurse 2 eSignature: {Esignature:[id number]}

## 2022-10-21 NOTE — PROGRESS NOTES
Pt left unit in [facility] escorted by staff. Pt discharge instruction explained and copy sent with patient. Patient verbalized understanding. Pt left in private vehicle friend/family.

## 2022-10-21 NOTE — CONSULTS
CARDIOLOGY CONSULT NOTE   Reason for consultation:  chest pain    Referring physician:  Mahesh Mejia MD     Primary care physician: Kristie Bates MD      Dear   Thanks for the consult. History of present illness:Joao is a 47 y. o.year old who  presents with Chest pain is at left side, radiated to shoulder, 6/10, pressure like, associated with shortness of breath, sweating, nausea, relieved with rest and NTG, aggravated with exertion    Chief Complaint   Patient presents with    Chest Pain     Blood pressure, cholesterol, blood glucose and weight are well controlled. Past medical history:    has a past medical history of CAD (coronary artery disease) and MRSA infection. Past surgical history:   has a past surgical history that includes Cholecystectomy (1992); skin biopsy; Coronary angioplasty with stent; back surgery; and joint replacement (Right). Social History:   reports that she has been smoking cigarettes. She has been smoking an average of .5 packs per day. She has never used smokeless tobacco. She reports that she does not currently use alcohol. She reports current drug use. Frequency: 2.00 times per week. Drug: Marijuana [place]).   Family history:   no family history of CAD, STROKE of DM    No Known Allergies    morphine sulfate (PF) injection 4 mg, Once  ondansetron (ZOFRAN) injection 4 mg, Once  aspirin EC tablet 81 mg, Daily  atorvastatin (LIPITOR) tablet 40 mg, Nightly  metoprolol tartrate (LOPRESSOR) tablet 12.5 mg, BID  sodium chloride flush 0.9 % injection 5-40 mL, 2 times per day  sodium chloride flush 0.9 % injection 5-40 mL, PRN  0.9 % sodium chloride infusion, PRN  enoxaparin Sodium (LOVENOX) injection 30 mg, BID  ondansetron (ZOFRAN-ODT) disintegrating tablet 4 mg, Q8H PRN   Or  ondansetron (ZOFRAN) injection 4 mg, Q6H PRN  polyethylene glycol (GLYCOLAX) packet 17 g, Daily PRN  acetaminophen (TYLENOL) tablet 650 mg, Q6H PRN   Or  acetaminophen (TYLENOL) suppository 650 mg, Q6H PRN  nitroGLYCERIN (NITROSTAT) SL tablet 0.4 mg, Q5 Min PRN  0.9 % sodium chloride infusion, Continuous      Current Facility-Administered Medications   Medication Dose Route Frequency Provider Last Rate Last Admin    morphine sulfate (PF) injection 4 mg  4 mg IntraVENous Once Miguel Lou MD        ondansetron [facility]) injection 4 mg  4 mg IntraVENous Once Miguel Lou MD        aspirin EC tablet 81 mg  81 mg Oral Daily Matheus Rivera MD        atorvastatin (LIPITOR) tablet 40 mg  40 mg Oral Nightly Matheus Rivera MD        metoprolol tartrate (LOPRESSOR) tablet 12.5 mg  12.5 mg Oral BID Matheus Rivera MD        sodium chloride flush 0.9 % injection 5-40 mL  5-40 mL IntraVENous 2 times per day Matheus Rivera MD        sodium chloride flush 0.9 % injection 5-40 mL  5-40 mL IntraVENous PRN Matheus Rivera MD        0.9 % sodium chloride infusion   IntraVENous PRN Matheus Rivera MD        enoxaparin Sodium (LOVENOX) injection 30 mg  30 mg SubCUTAneous BID Matheus Rivera MD        ondansetron (ZOFRAN-ODT) disintegrating tablet 4 mg  4 mg Oral Q8H PRN Matheus Rivera MD        Or    ondansetron (ZOFRAN) injection 4 mg  4 mg IntraVENous Q6H PRN Matheus Rivera MD        polyethylene glycol (GLYCOLAX) packet 17 g  17 g Oral Daily PRN Matheus Rivera MD        acetaminophen (TYLENOL) tablet 650 mg  650 mg Oral Q6H PRN Matheus Rivera MD        Or    acetaminophen (TYLENOL) suppository 650 mg  650 mg Rectal Q6H PRN Matheus Rivera MD        nitroGLYCERIN (NITROSTAT) SL tablet 0.4 mg  0.4 mg SubLINGual Q5 Min PRN Matheus Rivera MD        0.9 % sodium chloride infusion   IntraVENous Continuous Epi Mehran Cm MD         Review of Systems:   Constitutional: No Fever or Weight Loss   Eyes: No Decreased Vision  ENT: No Headaches, Hearing Loss or Vertigo  Cardiovascular: No chest pain, dyspnea on exertion, palpitations or loss of consciousness  Respiratory: No cough or wheezing    Gastrointestinal: No abdominal pain, appetite loss, blood in stools, constipation, diarrhea or heartburn  Genitourinary: No dysuria, trouble voiding, or hematuria  Musculoskeletal:  No gait disturbance, weakness or joint complaints  Integumentary: No rash or pruritis  Neurological: No TIA or stroke symptoms  Psychiatric: No anxiety or depression  Endocrine: No malaise, fatigue or temperature intolerance  Hematologic/Lymphatic: No bleeding problems, blood clots or swollen lymph nodes  Allergic/Immunologic: No nasal congestion or hives  All systems negative except as marked. Physical Examination:    Vitals:    10/21/22 0832   BP: 116/70   Pulse: 65   Resp: 10   Temp: 97.7 °F (36.5 °C)   SpO2:       Wt Readings from Last 3 Encounters:   10/21/22 283 lb 8 oz (128.6 kg)   12/05/21 295 lb 6.7 oz (134 kg)   11/12/20 260 lb (117.9 kg)     Body mass index is 41.87 kg/m². General Appearance:  No distress, conversant    Constitutional:  Well developed, Well nourished, No acute distress, Non-toxic appearance. HENT:  Normocephalic, Atraumatic, Bilateral external ears normal, Oropharynx moist, No oral exudates, Nose normal. Neck- Normal range of motion, No tenderness, Supple, No stridor,no apical-carotid delay, no carotid bruit  Eyes:  PERRL, EOMI, Conjunctiva normal, No discharge. Respiratory:  Normal breath sounds, No respiratory distress, No wheezing, No chest tenderness. ,no use of accessory muscles, diaphragm movement is normal  Cardiovascular: (PMI) apex non displaced,no lifts no thrills, no s3,no s4, Normal heart rate, Normal rhythm, No murmurs, No rubs, No gallops. Carotid arteries pulse and amplitude are normal no bruit, no abdominal bruit noted ( normal abdominal aorta ausculation), femoral arteries pulse and amplitude are normal no bruit, pedal pulses are normal  GI:  Bowel sounds normal, Soft, No tenderness, No masses, No pulsatile masses, no hepatosplenomegally, no bruits  : External genitalia appear normal, No masses or lesions. No discharge.  No CVA tenderness. Musculoskeletal:  Intact distal pulses, No edema, No tenderness, No cyanosis, No clubbing. Good range of motion in all major joints. No tenderness to palpation or major deformities noted. Back- No tenderness. Integument:  Warm, Dry, No erythema, No rash. Skin: no rash, no ulcers  Lymphatic:  No lymphadenopathy noted. Neurologic:  Alert & oriented x 3, Normal motor function, Normal sensory function, No focal deficits noted. Psychiatric:  Affect normal, Judgment normal, Mood normal.   Lab Review   Recent Labs     10/21/22  0151   WBC 10.4   HGB 11.7*   HCT 37.5         Recent Labs     10/21/22  0151      K 4.0      CO2 21   BUN 15   CREATININE 1.3*     No results for input(s): AST, ALT, ALB, BILIDIR, BILITOT, ALKPHOS in the last 72 hours. No results for input(s): TROPONINI in the last 72 hours. No results found for: BNP  Lab Results   Component Value Date    INR 0.90 11/30/2021    PROTIME 11.6 (L) 11/30/2021         EKG:pending    Chest Xray:NAD    ECHO:NORMAL LVEF  Labs, echo, meds reviewed  Assessment: 47 y. o.year old with PMH of  has a past medical history of CAD (coronary artery disease) and MRSA infection. Recommendations:    CAD: h/o LAD STEMI AND REVASCULARIZAION in 2019, will repeat stress test and echo,. Continue aspirin ,she has stopped all medications as she lost insurance, restart statins, and, betablockers, recommend to repeat EKG  Smoking: recommend to stop smoking  DYSLPIDEMIA: RECHECK LIPIDS, RESTART STATINS  Health maintenance: exerise and diet  All labs, medications and tests reviewed, continue all other medications of all above medical condition listed as is.          Beola Snellen, MD, 10/21/2022 8:52 AM

## 2022-10-21 NOTE — CARE COORDINATION
Chart reviewed. Pt from home, independent prior to admission. Pt has PCP and insurance. No discharge needs identified at this time. CM available should any new needs arise.

## 2022-10-21 NOTE — ED PROVIDER NOTES
[address] ENCOUNTER      Pt Name: Marilyn Townsend  MRN: [id number]  [place] 1970  Date of evaluation: 10/21/2022  Provider: Mandy Leyva MD    CHIEF COMPLAINT       Chief Complaint   Patient presents with    Chest Pain         HISTORY OF PRESENT ILLNESS      Marilyn Townsend is a 47 y.o. female who presents to the emergency department  for   Chief Complaint   Patient presents with    Chest Pain       67-year-old female presents with chest pain. She does have a history of CAD status post MI. She has undergone stenting before. She reports has not seen her cardiologist in quite some time. \"  She presents from home today. She reportedly developed some significant chest pressure and midsternal chest pain. She reports that it feels similar to when she had her prior MI. She was present by EMS. EMS did note that she had high blood pressure. She was given aspirin and 2 nitro by EMS. She reports her chest pressure is improved since getting the nitro. She presents with a GCS of 15. EKG that was initially done by EMS did show some abnormalities. STEMI alert was called in the field. Upon presentation the emergency department, we did repeat the EKG and it does not seem consistent with STEMI. We did speak with cardiology and STEMI alert has been canceled. Nursing Notes, Triage Notes & Vital Signs were reviewed. REVIEW OF SYSTEMS    (2-9 systems for level 4, 10 or more for level 5)     Review of Systems   Constitutional:  Negative for chills and fever. HENT:  Negative for congestion, rhinorrhea and sore throat. Eyes:  Negative for pain and discharge. Respiratory:  Negative for cough and shortness of breath. Cardiovascular:  Positive for chest pain. Negative for leg swelling. Gastrointestinal:  Negative for abdominal pain, nausea and vomiting. Endocrine: Negative for polydipsia and polyuria. Genitourinary:  Negative for dysuria. Musculoskeletal:  Negative for back pain and neck pain. Skin:  Negative for pallor and wound. Neurological:  Negative for dizziness, facial asymmetry, light-headedness, numbness and headaches. Psychiatric/Behavioral:  Negative for confusion. Except as noted above the remainder of the review of systems was reviewed and negative. PAST MEDICAL HISTORY     Past Medical History:   Diagnosis Date    CAD (coronary artery disease)     MRSA infection        Prior to Admission medications    Medication Sig Start Date End Date Taking?  Authorizing Provider   atorvastatin (LIPITOR) 40 MG tablet Take 1 tablet by mouth nightly 12/4/21   Vianey Rasmussen MD   sennosides-docusate sodium (SENOKOT-S) 8.6-50 MG tablet Take 2 tablets by mouth 2 times daily as needed for Constipation 12/4/21   Vianey Rasmussen MD   ferrous sulfate (IRON 325) 325 (65 Fe) MG tablet Take 1 tablet by mouth 2 times daily 12/4/21   Vianey Rasmussen MD   ascorbic acid (VITAMIN C) 500 MG tablet Take 1 tablet by mouth 2 times daily 12/4/21   Vianey Rasmussen MD   fluticasone-umeclidin-vilant (TRELEGY ELLIPTA) 115-25.4-54 MCG/INH AEPB Inhale 1 puff into the lungs daily 10/6/20   HUMZA Zimmer CNP   albuterol sulfate HFA (PROVENTIL HFA) 108 (90 Base) MCG/ACT inhaler Inhale 2 puffs into the lungs every 6 hours as needed for Wheezing 10/6/20 11/5/20  HUMZA Zimmer CNP   Spacer/Aero-Holding Negrita Christian 1 Device by Does not apply route daily as needed (use with MDI inhaler) 10/6/20   HUMZA Zimmer CNP   metoprolol tartrate (LOPRESSOR) 25 MG tablet Take 0.5 tablets by mouth 2 times daily 7/23/20   HUMZA Guerrero CNP   aspirin 81 MG EC tablet Take 1 tablet by mouth daily for 90 doses 6/14/19 7/23/20  Vidal Rodas MD        Patient Active Problem List   Diagnosis    STEMI involving right coronary artery [facility])    STEMI (ST elevation myocardial infarction) ([facility] [address] Chest pain    Coronary artery disease involving native coronary artery of native heart with angina pectoris (HCC)    Mixed hyperlipidemia    Spinal stenosis of lumbar region    Hip pain, acute, right    Primary osteoarthritis of right hip         SURGICAL HISTORY       Past Surgical History:   Procedure Laterality Date    BACK SURGERY      CHOLECYSTECTOMY  1992    CORONARY ANGIOPLASTY WITH STENT PLACEMENT      JOINT REPLACEMENT Right     SKIN BIOPSY      16 mrsa spot removals, head shoulder stomach back         CURRENT MEDICATIONS       Previous Medications    ALBUTEROL SULFATE HFA (PROVENTIL HFA) 108 (90 BASE) MCG/ACT INHALER    Inhale 2 puffs into the lungs every 6 hours as needed for Wheezing    ASCORBIC ACID (VITAMIN C) 500 MG TABLET    Take 1 tablet by mouth 2 times daily    ASPIRIN 81 MG EC TABLET    Take 1 tablet by mouth daily for 90 doses    ATORVASTATIN (LIPITOR) 40 MG TABLET    Take 1 tablet by mouth nightly    FERROUS SULFATE (IRON 325) 325 (65 FE) MG TABLET    Take 1 tablet by mouth 2 times daily    FLUTICASONE-UMECLIDIN-VILANT (TRELEGY ELLIPTA) 100-62.5-25 MCG/INH AEPB    Inhale 1 puff into the lungs daily    METOPROLOL TARTRATE (LOPRESSOR) 25 MG TABLET    Take 0.5 tablets by mouth 2 times daily    SENNOSIDES-DOCUSATE SODIUM (SENOKOT-S) 8.6-50 MG TABLET    Take 2 tablets by mouth 2 times daily as needed for Constipation    SPACER/AERO-HOLDING CHAMBERS QUINTON    1 Device by Does not apply route daily as needed (use with MDI inhaler)       ALLERGIES     Patient has no known allergies.     FAMILY HISTORY       Family History   Problem Relation Age of Onset    Diabetes Father     Coronary Art Dis Father     Heart Disease Father     High Cholesterol Father     High Blood Pressure Father     Arthritis Sister     Asthma Sister           SOCIAL HISTORY       Social History     Socioeconomic History    Marital status: Legally      Spouse name: Hernesto Diego    Number of children: 1    Years of education: 12   Tobacco Use    Smoking status: Every Day     Packs/day: 0.50     Types: Cigarettes    Smokeless tobacco: Never    Tobacco comments:     cut back 3 months ago to 1/2 pack from 2 packs a day   Vaping Use    Vaping Use: Never used   Substance and Sexual Activity    Alcohol use: Not Currently     Comment: occasional    Drug use: Yes     Frequency: 2.0 times per week     Types: Marijuana [place])     Comment: none since heart attack     Sexual activity: Not Currently       SCREENINGS               PHYSICAL EXAM    (up to 7 for level 4, 8 or more for level 5)     ED Triage Vitals   BP Temp Temp src Pulse Resp SpO2 Height Weight   -- -- -- -- -- -- -- --       Physical Exam  Vitals reviewed. Constitutional:       Appearance: She is not ill-appearing or toxic-appearing. HENT:      Head: Normocephalic and atraumatic. Nose: No congestion or rhinorrhea. Mouth/Throat:      Mouth: Mucous membranes are moist.   Eyes:      Extraocular Movements: Extraocular movements intact. Pupils: Pupils are equal, round, and reactive to light. Cardiovascular:      Rate and Rhythm: Tachycardia present. Pulmonary:      Effort: Pulmonary effort is normal.   Chest:      Chest wall: No tenderness. Abdominal:      Palpations: Abdomen is soft. Tenderness: There is no guarding. Musculoskeletal:      Cervical back: Normal range of motion and neck supple. No tenderness. Right lower leg: No edema. Left lower leg: No edema. Skin:     General: Skin is warm. Capillary Refill: Capillary refill takes less than 2 seconds. Neurological:      General: No focal deficit present. Mental Status: She is alert.        DIAGNOSTIC RESULTS     Labs Reviewed   CBC WITH AUTO DIFFERENTIAL - Abnormal; Notable for the following components:       Result Value    Hemoglobin 11.7 (*)     MCH 26.8 (*)     MCHC 31.2 (*)     RDW 15.9 (*)     Lymphocytes % 44.4 (*)     Monocytes % 5.7 (*)     All other components within normal limits   BASIC METABOLIC PANEL - Abnormal; Notable for the following components:    Creatinine 1.3 (*)     Est, Glom Filt Rate 50 (*)     Glucose 165 (*)     All other components within normal limits   TROPONIN - Abnormal; Notable for the following components:    Troponin T 0.010 (*)     All other components within normal limits          EKG: All EKG's are interpreted by the Emergency Department Physician who either signs or Co-signs this chart in the absence of a cardiologist.       EKG Interpretation    Interpreted by emergency department physician    EKG is interpreted by me. EKG shows sinus rhythm 106 bpm, axis is nondeviated, no remarkable ST segment elevations or depressions, T waves are unremarkable, there is a P wave in V1 consistent with right bundle branch block, there is isolated PVC, TN interval 136, QRS ration 112, QTC of 4-78. Final impression, nonspecific EKG, tachycardia. From review of records, prior EKGs have shown right bundle branch block. Rita Rodgers MD     RADIOLOGY:     Non-plain film images such as CT, Ultrasound and MRI are read by the radiologist. Plain radiographic images are visualized and preliminarily interpreted by the emergency physician.        Interpretation per the Radiologist below, if available at the time of this note:    XR CHEST PORTABLE    (Results Pending)         ED BEDSIDE ULTRASOUND:   Performed by ED Physician Rita Rodgers MD       LABS:  Labs Reviewed   CBC WITH AUTO DIFFERENTIAL - Abnormal; Notable for the following components:       Result Value    Hemoglobin 11.7 (*)     MCH 26.8 (*)     MCHC 31.2 (*)     RDW 15.9 (*)     Lymphocytes % 44.4 (*)     Monocytes % 5.7 (*)     All other components within normal limits   BASIC METABOLIC PANEL - Abnormal; Notable for the following components:    Creatinine 1.3 (*)     Est, Glom Filt Rate 50 (*)     Glucose 165 (*)     All other components within normal limits   TROPONIN - Abnormal; Notable for the following components:    Troponin T 0.010 (*)     All other components within normal limits       All other labs were within normal range or not returned as of this dictation. EMERGENCY DEPARTMENT COURSE and DIFFERENTIAL DIAGNOSIS/MDM:   Vitals:    Vitals:    10/21/22 0144 10/21/22 0347 10/21/22 0401   BP: (!) 140/88 118/71 109/66   Pulse: (!) 110 73 76   Resp: 18 13 13   Temp: 97.1 °F (36.2 °C)     TempSrc: Oral     SpO2: 98% 93% 94%           MDM  Number of Diagnoses or Management Options  Diagnosis management comments: 4year-old female presents with chest pain. She has a history of CAD, MI status post stenting. She reports has not seen her cardiologist \"in some time. Just present by EMS. She developed acute onset chest pressure that radiated to her jaw and left arm. She comes as by EMS. Reports symptoms seem similar to her past heart attack. She was given aspirin as well as 2 nitro by EMS patient is endorse that the chest pressure improved after nitro treatment. EKG that was obtained by EMS showed some possibly concerning ST segment elevation so stimulant was called in the field. EKG did appear to be a bit equivocal.  Upon presentation to the emergency department, we did repeat the EKG and it is not showing any ST segment elevations. We did show the EKGs to cardiology. STEMI alert was canceled. She presents with overall unremarkable vital signs. She does not tachycardia. She was afebrile. Respirations are within normal limits. Ox saturations with mid 90s on room air. Labs are obtained. If her troponin is undetectable. She was given some morphine in the emergency department for chest pain. She will be admitted for chest pain rule out and cardiology consultation. She is agreeable to admission.        Amount and/or Complexity of Data Reviewed  Decide to obtain previous medical records or to obtain history from someone other than the patient: yes        -  Patient seen and evaluated in the emergency department. -  Triage and nursing notes reviewed and incorporated. -  Old chart records reviewed and incorporated. -  Work-up included:  See above  -  Results discussed with patient. CONSULTS:  None    PROCEDURES:  None performed unless otherwise noted below     Procedures        FINAL IMPRESSION      1. Chest pain, unspecified type          DISPOSITION/PLAN   DISPOSITION        PATIENT REFERRED TO:  No follow-up provider specified. DISCHARGE MEDICATIONS:  New Prescriptions    No medications on file       ED Provider Disposition Time  DISPOSITION        Appropriate personal protective equipment was worn during the patient's evaluation. These included surgical, eye protection, surgical mask or in 95 respirator and gloves. The patient was also placed in a surgical mask for the prevention of possible spread of respiratory viral illnesses. The Patient was instructed to read the package inserts with any medication that was prescribed. Major potential reactions and medication interactions were discussed. The Patient understands that there are numerous possible adverse reactions not covered. The patient was also instructed to arrange follow-up with his or her primary care provider for review of any pending labwork or incidental findings on any radiology results that were obtained. All efforts were made to discuss any incidental findings that require further monitoring. Controlled Substances Monitoring:     No flowsheet data found.     (Please note that portions of this note were completed with a voice recognition program.  Efforts were made to edit the dictations but occasionally words are mis-transcribed.)    Andrew Colon MD (electronically signed)  Attending Emergency Physician            Andrew Colon MD  10/21/22 6621

## 2022-10-21 NOTE — ED NOTES
ED TO INPATIENT SBAR HANDOFF    Patient Name: Radha Sexton   :  1970  47 y.o. MRN:  [id number]  Preferred Name    ED Room #:  TR04/04TR-04  Family/Caregiver Present no   Restraints no   Sitter no   Sepsis Risk Score Sepsis Risk Score: 0.57    Situation  Code Status: Prior No additional code details. Allergies: Patient has no known allergies. Weight: No data found. Arrived from: home  Chief Complaint:   Chief Complaint   Patient presents with   [facility] Problem/Diagnosis:  Active Problems:    * No active hospital problems. *  Resolved Problems:    * No resolved hospital problems.  *    Imaging:   XR CHEST PORTABLE    (Results Pending)     Abnormal labs:   Abnormal Labs Reviewed   CBC WITH AUTO DIFFERENTIAL - Abnormal; Notable for the following components:       Result Value    Hemoglobin 11.7 (*)     MCH 26.8 (*)     MCHC 31.2 (*)     RDW 15.9 (*)     Lymphocytes % 44.4 (*)     Monocytes % 5.7 (*)     All other components within normal limits   BASIC METABOLIC PANEL - Abnormal; Notable for the following components:    Creatinine 1.3 (*)     Est, Glom Filt Rate 50 (*)     Glucose 165 (*)     All other components within normal limits   TROPONIN - Abnormal; Notable for the following components:    Troponin T 0.010 (*)     All other components within normal limits     Critical values: no     Abnormal Assessment Findings:     Background  History:   Past Medical History:   Diagnosis Date    CAD (coronary artery disease)     MRSA infection        Assessment    Vitals/MEWS: MEWS Score: 2  Level of Consciousness: Alert (0)   Vitals:    10/21/22 0401 10/21/22 0421 10/21/22 0432 10/21/22 0446   BP: 109/66 (!) 100/59 (!) 101/59 107/65   Pulse: 76 70 72 69   Resp: 13 14 16 (!) 8   Temp:       TempSrc:       SpO2: 94% 95% 94% 97%     FiO2 (%):   O2 Flow Rate: O2 Device: None (Room air)    Cardiac Rhythm: Cardiac Rhythm: Sinus rhythm with PVC  Pain Assessment: 3/10 [x] Verbal [] Deangelo Garcia Scale  Pain Scale: Pain Assessment  Pain Assessment: 0-10  Patient's Stated Pain Goal: 6  Last documented pain score (0-10 scale)    Last documented pain medication administered: 0120 4mg Morphine  Mental Status: alert  C-SSRS: Risk of Suicide: No Risk  Bedside swallow:    Karen Coma Scale (GCS): Goodman Coma Scale  Eye Opening: Spontaneous  Best Verbal Response: Oriented  Best Motor Response: Obeys commands  Karen Coma Scale Score: 15  Active LDA's:   Peripheral IV 12/03/21 Left; Anterior Forearm (Active)     PO Status: Regular  Pertinent or High Risk Medications/Drips: no   o If Yes, please provide details:   Pending Blood Product Administration: no     You may also review the ED PT Care Timeline found under the Summary Nursing Index tab. Recommendation    Pending orders   Plan for Discharge (if known):    Additional Comments:    If any further questions, please call Sending RN at [phone]    Electronically signed by: Electronically signed by Vel Tatum RN on 10/21/2022 at 4:58 AM     Vel Tatum RN  10/21/22 6056

## 2022-10-22 RX ORDER — METOPROLOL TARTRATE 50 MG/1
25 TABLET, FILM COATED ORAL 2 TIMES DAILY
Qty: 60 TABLET | Refills: 3 | Status: SHIPPED | OUTPATIENT
Start: 2022-10-22

## 2022-10-22 RX ORDER — NITROGLYCERIN 0.3 MG/1
0.3 TABLET SUBLINGUAL EVERY 5 MIN PRN
Qty: 30 TABLET | Refills: 0 | Status: SHIPPED | OUTPATIENT
Start: 2022-10-22 | End: 2022-11-06

## 2022-10-22 RX ORDER — ATORVASTATIN CALCIUM 40 MG/1
40 TABLET, FILM COATED ORAL DAILY
Qty: 30 TABLET | Refills: 3 | Status: SHIPPED | OUTPATIENT
Start: 2022-10-22

## 2022-10-22 RX ORDER — ASPIRIN 81 MG/1
81 TABLET ORAL DAILY
Qty: 30 TABLET | Refills: 0 | Status: SHIPPED | OUTPATIENT
Start: 2022-10-22

## 2022-10-24 LAB
EKG ATRIAL RATE: 106 BPM
EKG DIAGNOSIS: NORMAL
EKG P AXIS: 39 DEGREES
EKG P-R INTERVAL: 136 MS
EKG Q-T INTERVAL: 360 MS
EKG QRS DURATION: 112 MS
EKG QTC CALCULATION (BAZETT): 478 MS
EKG R AXIS: 139 DEGREES
EKG T AXIS: 60 DEGREES
EKG VENTRICULAR RATE: 106 BPM

## 2022-10-24 PROCEDURE — 93010 ELECTROCARDIOGRAM REPORT: CPT | Performed by: INTERNAL MEDICINE

## 2022-10-26 ENCOUNTER — OFFICE VISIT (OUTPATIENT)
Dept: CARDIOLOGY CLINIC | Age: 52
End: 2022-10-26
Payer: COMMERCIAL

## 2022-10-26 VITALS
BODY MASS INDEX: 42.24 KG/M2 | WEIGHT: 285.2 LBS | DIASTOLIC BLOOD PRESSURE: 74 MMHG | SYSTOLIC BLOOD PRESSURE: 108 MMHG | OXYGEN SATURATION: 95 % | HEART RATE: 62 BPM | HEIGHT: 69 IN

## 2022-10-26 DIAGNOSIS — R07.9 CHEST PAIN, UNSPECIFIED TYPE: ICD-10-CM

## 2022-10-26 DIAGNOSIS — I25.119 CORONARY ARTERY DISEASE INVOLVING NATIVE CORONARY ARTERY OF NATIVE HEART WITH ANGINA PECTORIS (HCC): Primary | ICD-10-CM

## 2022-10-26 DIAGNOSIS — E78.2 MIXED HYPERLIPIDEMIA: ICD-10-CM

## 2022-10-26 DIAGNOSIS — I10 PRIMARY HYPERTENSION: ICD-10-CM

## 2022-10-26 PROCEDURE — 3078F DIAST BP <80 MM HG: CPT | Performed by: NURSE PRACTITIONER

## 2022-10-26 PROCEDURE — 99214 OFFICE O/P EST MOD 30 MIN: CPT | Performed by: NURSE PRACTITIONER

## 2022-10-26 PROCEDURE — 3074F SYST BP LT 130 MM HG: CPT | Performed by: NURSE PRACTITIONER

## 2022-10-26 ASSESSMENT — ENCOUNTER SYMPTOMS: SHORTNESS OF BREATH: 0

## 2022-10-26 NOTE — PATIENT INSTRUCTIONS
Please be informed that if you contact our office outside of normal business hours the physician on call cannot help with any scheduling or rescheduling issues, procedure instruction questions or any type of medication issue. We advise you for any urgent/emergency that you go to the nearest emergency room! PLEASE CALL OUR OFFICE DURING NORMAL BUSINESS HOURS    Monday - Friday   8 am to 5 pm    [facility] [address]: [phone]    [place]:  [phone]      **It is YOUR responsibilty to bring medication bottles and/or updated medication list to [address] [place] [address]. This will allow us to better serve you and all your healthcare needs**      Thank you for allowing us to care for you today! We want to ensure we can follow your treatment plan and we strive to give you the best outcomes and experience possible. If you ever have a life threatening emergency and call 911 - for an ambulance (EMS)   Our providers can only care for you at:   [facility] or [facility]. Even if you have someone take you or you drive yourself we can only care for you in a [address] [facility] facility. Our providers are not setup at the other healthcare locations!

## 2022-10-26 NOTE — ASSESSMENT & PLAN NOTE
- Recent stress test shows fixed infarct. Patient was not taking prescribed medication due to lack of insurance.

## 2022-10-26 NOTE — ASSESSMENT & PLAN NOTE
--At or near goal Yes    -No recent labs available- lab slip given   -She is to continue current medications (Lipitor 40 mg) Hepatic function panel WNL. No abdominal pain. No myalgias.     -The nature of cardiac risk has been fully discussed with this patient. I have made her aware of her LDL target goal given her cardiovascular risk analysis. I have discussed the appropriate diet. The need for lifelong compliance in order to reduce risk is stressed. A regular exercise program is recommended to help achieve and maintain normal body weight, fitness and improve lipid balance. A written copy of a low fat, low cholesterol diet has been given to the patient.

## 2022-10-26 NOTE — ASSESSMENT & PLAN NOTE
-STEMI 6/13/19 PCI to distal LAD and apex with penumbra atherectomy. Recent stress test shows fixed inferior apical wall.     Primary and secondary prevention discussed with patient:   -Low sodium cardiac diet   -exercise 30 min a day three days a week    Continue current medications ASA, BB, statin

## 2022-10-26 NOTE — PROGRESS NOTES
[facility] ([facility] [address] [facility],Third Floor  Phone: [phone]    Fax [phone]                  Suellen Cramer MD, Valeriy Scott MD, [address] [facility], MD, MD Princess Valenzuela MD, Elizabeth Boyd MD, Denise Mayers MD, [address], APRN       Noemi San, APRN  Ross Bruno, APRN       Alexsandra Barnes, APRN        Cardiology Progress Note      10/26/2022    RE: Livia Snow  (1970)                             Primary cardiologist: Daniel Zapien       Subjective:  CC:   1. Coronary artery disease involving native coronary artery of native heart with angina pectoris ([facility]    2. Primary hypertension    3. Mixed hyperlipidemia    4. Chest pain, unspecified type        HPI: Livia Snow, who is a  47y.o. year old female with a past medical history as listed below. Patient presents to the office for follow up on CAD, HTN, chest pain, and hyperlipidemia. Patient was a STEMI in June 2019. She proceeded to have PCI of LAD which was complicated by further distal occlusion requiring arthrectomy. He was recently admitted to the hospital with complaints of left-sided chest pain radiating to shoulder. She had stopped all medications due to lack of insurance coverage. 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 Patient denies any chest pain, shortness of breath, dizziness, syncope, or palpitations.     Past Medical History:   Diagnosis Date    CAD (coronary artery disease)     MRSA infection        Current Outpatient Medications   Medication Sig Dispense Refill    aspirin EC 81 MG EC tablet Take 1 tablet by mouth daily 30 tablet 0    atorvastatin (LIPITOR) 40 MG tablet Take 1 tablet by mouth daily 30 tablet 3    metoprolol tartrate (LOPRESSOR) 50 MG tablet Take 0.5 tablets by mouth 2 times daily 60 tablet 3    nitroGLYCERIN (NITROSTAT) 0.3 MG SL tablet Place 1 tablet under the tongue every 5 minutes as needed for Chest pain up to max of 3 total doses. If no relief after 1 dose, call 911. 30 tablet 0    fluticasone-umeclidin-vilant (TRELEGY ELLIPTA) 100-62.5-25 MCG/INH AEPB Inhale 1 puff into the lungs daily 1 each 5    Spacer/Aero-Holding Chambers QUINTON 1 Device by Does not apply route daily as needed (use with MDI inhaler) 1 Device 0     No current facility-administered medications for this visit. Review of Systems:  Review of Systems   Respiratory:  Negative for shortness of breath. Cardiovascular:  Negative for chest pain, palpitations and leg swelling. Musculoskeletal: Negative. Skin: Negative. Neurological:  Negative for dizziness and weakness. All other systems reviewed and are negative. Objective:      Physical Exam:  /74 (Site: Left Upper Arm, Position: Sitting, Cuff Size: Large Adult)   Pulse 62   Ht 5' 9\" (1.753 m)   Wt 285 lb 3.2 oz (129.4 kg)   LMP 06/13/2018   SpO2 95%   BMI 42.12 kg/m²   Wt Readings from Last 3 Encounters:   10/26/22 285 lb 3.2 oz (129.4 kg)   10/21/22 283 lb 8 oz (128.6 kg)   12/05/21 295 lb 6.7 oz (134 kg)     Body mass index is 42.12 kg/m². Physical exam:  Physical Exam  Constitutional:       Appearance: She is well-developed. Cardiovascular:      Rate and Rhythm: Normal rate and regular rhythm. Pulses: Intact distal pulses. Dorsalis pedis pulses are 2+ on the right side and 2+ on the left side. Posterior tibial pulses are 2+ on the right side and 2+ on the left side. Heart sounds: Normal heart sounds, S1 normal and S2 normal.   Pulmonary:      Effort: Pulmonary effort is normal.      Breath sounds: Normal breath sounds. Musculoskeletal:         General: Normal range of motion. Skin:     General: Skin is warm and dry. Neurological:      Mental Status: She is alert and oriented to person, place, and time.         DATA:  No results found for: CKTOTAL, CKMB, CKMBINDEX, TROPONINI  BNP:  No results found for: BNP  PT/INR:  No results found for: PTINR  Lab Results   Component Value Date    LABA1C 5.6 02/13/2020     Lab Results   Component Value Date    CHOL 147 12/01/2021    TRIG 136 12/01/2021    HDL 43 12/01/2021    LDLDIRECT 91 12/01/2021     Lab Results   Component Value Date    ALT 17 12/01/2021    AST 18 12/01/2021     TSH:    Lab Results   Component Value Date/Time    TSH 2.01 02/13/2020 02:40 PM       Vitals:    10/26/22 1149   BP: 108/74   Pulse: 62   SpO2: 95%       Echo:10/21/22  Left ventricular systolic function is normal.   Ejection fraction is visually estimated at 50-55%. Septal bounce. Mild left ventricular hypertrophy. Mildly dilated right ventricle. Mild to moderate tricuspid regurgitation; RVSP: 51 mmHg. No evidence of any pericardial effusion. Stress Test:10/21/22  Small size, moderate intensity, nonreversible perfusion defect in inferior apical wall. Patient found to have apical aneurysm from STEMI. Cath:6/13/19  1. PCI to distal LAD and [place] for STEMI with 100 % occlusion of   distal LAD with no reflow after PCI using 3.0 X 18 and 2.5 X 23   [place]   2. Penumbra atherectomy for no flow , eventually we got LE III   flow for distal LAD   3. LV gram shows Akinetic/ dyskinetic [place] with Hyper   contractile base   4. Circ and RCA are patent      The ASCVD Risk score (Marshall DK, et al., 2019) failed to calculate for the following reasons: The patient has a prior MI or stroke diagnosis      Assessment/ Plan:     Primary hypertension   - Stable, continue with Lopressor 25 mg BID. Mixed hyperlipidemia   --At or near goal Yes    -She is to continue current medications (Lipitor 40 mg) Hepatic function panel WNL. No abdominal pain. No myalgias.     -The nature of cardiac risk has been fully discussed with this patient. I have made her aware of her LDL target goal given her cardiovascular risk analysis.  I have discussed the appropriate diet. The need for lifelong compliance in order to reduce risk is stressed. A regular exercise program is recommended to help achieve and maintain normal body weight, fitness and improve lipid balance. A written copy of a low fat, low cholesterol diet has been given to the patient. Chest pain   - Resolved. Recent stress test shows fixed infarct. Patient was not taking prescribed medication due to lack of insurance. Continue with BB and ASA    Coronary artery disease involving native coronary artery of native heart with angina pectoris ([address]   -STEMI 6/13/19 PCI to distal LAD and apex with penumbra atherectomy. Recent stress test shows fixed inferior apical wall. Primary and secondary prevention discussed with patient:   -Low sodium cardiac diet   -exercise 30 min a day three days a week    Continue current medications ASA, BB, statin      Patient seen, interviewed and examined. Testing was reviewed. Patient is encouraged to exercise even a brisk walk for 30 minutes at least 3 to 4 times a week. Lifestyle and risk factor modificatons discussed. Various goals are discussed and questions answered. Continue current medications. Appropriate prescriptions are addressed. Questions answered and patient verbalizes understanding. Call for any problems, questions, or concerns. Pt is to follow up in 1 months for Cardiac management    Electronically signed by Xuan Cintron.  HUMZA Jacobs CNP on 10/26/2022 at 12:14 PM

## 2023-02-20 ENCOUNTER — OFFICE VISIT (OUTPATIENT)
Dept: PULMONOLOGY | Age: 53
End: 2023-02-20
Payer: COMMERCIAL

## 2023-02-20 VITALS — HEIGHT: 68 IN | BODY MASS INDEX: 41.68 KG/M2 | HEART RATE: 84 BPM | WEIGHT: 275 LBS | OXYGEN SATURATION: 98 %

## 2023-02-20 DIAGNOSIS — J44.9 COPD, SEVERE (HCC): Primary | ICD-10-CM

## 2023-02-20 DIAGNOSIS — R07.9 CHEST PAIN, UNSPECIFIED TYPE: ICD-10-CM

## 2023-02-20 DIAGNOSIS — R06.09 DYSPNEA ON EXERTION: ICD-10-CM

## 2023-02-20 PROCEDURE — G8427 DOCREV CUR MEDS BY ELIG CLIN: HCPCS | Performed by: NURSE PRACTITIONER

## 2023-02-20 PROCEDURE — G8417 CALC BMI ABV UP PARAM F/U: HCPCS | Performed by: NURSE PRACTITIONER

## 2023-02-20 PROCEDURE — 1036F TOBACCO NON-USER: CPT | Performed by: NURSE PRACTITIONER

## 2023-02-20 PROCEDURE — G8484 FLU IMMUNIZE NO ADMIN: HCPCS | Performed by: NURSE PRACTITIONER

## 2023-02-20 PROCEDURE — 3017F COLORECTAL CA SCREEN DOC REV: CPT | Performed by: NURSE PRACTITIONER

## 2023-02-20 PROCEDURE — 3023F SPIROM DOC REV: CPT | Performed by: NURSE PRACTITIONER

## 2023-02-20 PROCEDURE — 99214 OFFICE O/P EST MOD 30 MIN: CPT | Performed by: NURSE PRACTITIONER

## 2023-02-20 RX ORDER — ALBUTEROL SULFATE 90 UG/1
2 AEROSOL, METERED RESPIRATORY (INHALATION) EVERY 6 HOURS PRN
Qty: 1 EACH | Refills: 5 | Status: SHIPPED | OUTPATIENT
Start: 2023-02-20

## 2023-02-20 ASSESSMENT — ENCOUNTER SYMPTOMS
ALLERGIC/IMMUNOLOGIC NEGATIVE: 1
SHORTNESS OF BREATH: 1
EYES NEGATIVE: 1
GASTROINTESTINAL NEGATIVE: 1

## 2023-02-20 NOTE — PROGRESS NOTES
Shira Deal (:  1970) is a 47 y.o. female,Established patient, here for evaluation of the following chief complaint(s):  Medication Refill        Subjective   SUBJECTIVE/OBJECTIVE:  Shira Deal is a 56y.o. female with history of intermittent productive cough, shortness of breath on exertion, smoker, CAD, STEMI, HLD chronic back pain, Stents in LAD. She presents today to the pulmonary clinic for medication refills on Trelegy 100 mcg and albuterol rescue inhaler. She reports that since taking Trelegy she has used her rescue inhaler very little. She denies recent hospitalization and ED visits for sob. She has had a latest hospital visit for chest pain that was heart related in 10/2022. She states that she is not Covid-19 vaccinated and that she wanted to take flu and pneumonia shots but did not know if she should. I discussed with Ana Maria Moran risk and benefits of being vaccinated against Covid-19, flu and pneumonia and recommendations for yearly vaccines. She has had chickenpox in childhood and is contemplating getting shingles vaccine. She denies any known contacts with persons with respiratory infection, positive for coronavirus or under investigation for possible coronavirus exposure and states that she is practicing social distancing, wearing a mask when out in public, washing hands frequently or using hand . Denies any fever, chills, malaise, change in sensation of taste or smell, headache or lightheadedness. She continues to smoke. She states that she quit smoking for one year, but then started smoking again after her nephew was killed. She stopped smoking again 20 days ago. She states that she understands her risk of cancer and heart disease increases with smoking. She is trying to stop for not only her health but that cancer runs in her family on the maternal side.   I discussed with Ana Maria Moran about having a yearly LDCT cancer screning completed because of her age, years of smoking and and family hx of  lung cancer. Last in office spirometry was on 10/07/2020 FEV1 42.2 % FVC 62.4% FEV!/FVC 66.6% with significant change pre post bronchodilator. She is reporting increase SOB with pain that is as mild as 1/10 to its worse at 7/10 pain. Pain intensifies on inspiration and radiates to the right upper should and down to just lateral to the outer axillary area. Xray taken on 10/24/2022 was compared to xray on 11/30/2021. Both showed mild cardiomegaly, but were unremarkable to cardiopulmonary disease process. CTA on 11/31/2021 Bronchiolitis related to smoking, but showed no PE. Patt Gil denies snoring. She sleeps on one pillow. She endorses feeling fatigue, but that she gets 6 to 7 hours of uninterrupted sleep unless she is up once for bathroom. She follows   for cardiology. Medication Refill  Associated symptoms include fatigue. Review of Systems   Constitutional:  Positive for fatigue. HENT: Negative. Eyes: Negative. Respiratory:  Positive for shortness of breath. Cardiovascular: Negative. Gastrointestinal: Negative. Endocrine: Negative. Genitourinary: Negative. Musculoskeletal: Negative. Skin: Negative. Allergic/Immunologic: Negative. Neurological: Negative. Psychiatric/Behavioral: Negative. Objective   Physical Exam  Vitals and nursing note reviewed. Constitutional:       General: She is awake. Appearance: Normal appearance. She is well-developed and well-groomed. She is obese. HENT:      Head: Normocephalic. Nose: Nose normal.   Eyes:      Extraocular Movements: Extraocular movements intact. Pupils: Pupils are equal, round, and reactive to light. Cardiovascular:      Rate and Rhythm: Normal rate and regular rhythm. Pulses: Normal pulses. Pulmonary:      Effort: Pulmonary effort is normal.      Breath sounds: Wheezing present.       Comments: Upper right posterior lung  Abdominal:      Palpations: Abdomen is soft. Musculoskeletal:         General: Normal range of motion. Cervical back: Normal range of motion and neck supple. Skin:     General: Skin is warm and dry. Neurological:      General: No focal deficit present. Mental Status: She is alert and oriented to person, place, and time. Psychiatric:         Mood and Affect: Mood normal.         Behavior: Behavior normal. Behavior is cooperative. Thought Content: Thought content normal.         Judgment: Judgment normal.         ASSESSMENT/PLAN:  1. COPD, severe ([address]  -     CT CHEST LOW DOSE (LDCT); Future  -     Creatinine; Future  -     Spirometry With Bronchodilator; Future  2. Dyspnea on exertion  3. Chest pain, unspecified type    --Recommend receiving yearly vaccines (Flu and Covid-19 booster)  --Recommended receiving pneumonia and shingles vaccines  --Educated on smoking cessation  --Refilled Trelegy 100 mcg with instructions to take one puff daily rinse mouth after use. 14 day sample provided with refills   --LDCT screening for cancer. Current Outpatient Medications on File Prior to Visit   Medication Sig Dispense Refill    aspirin EC 81 MG EC tablet Take 1 tablet by mouth daily 30 tablet 0    atorvastatin (LIPITOR) 40 MG tablet Take 1 tablet by mouth daily 30 tablet 3    metoprolol tartrate (LOPRESSOR) 50 MG tablet Take 0.5 tablets by mouth 2 times daily 60 tablet 3    nitroGLYCERIN (NITROSTAT) 0.3 MG SL tablet Place 1 tablet under the tongue every 5 minutes as needed for Chest pain up to max of 3 total doses. If no relief after 1 dose, call 911. 30 tablet 0    fluticasone-umeclidin-vilant (TRELEGY ELLIPTA) 100-62.5-25 MCG/INH AEPB Inhale 1 puff into the lungs daily 1 each 5    Spacer/Aero-Holding Chambers QUINTON 1 Device by Does not apply route daily as needed (use with MDI inhaler) 1 Device 0     No current facility-administered medications on file prior to visit.         Active Ambulatory Problems     Diagnosis Date Noted    STEMI involving right coronary artery (HCC)     [facility] elevation [facility] infarction) [facility] 06/13/2019    Chest pain 06/28/2020    Coronary artery disease involving native coronary artery of native heart with angina pectoris ([facility] 07/17/2020    Mixed hyperlipidemia 07/17/2020    Spinal stenosis of lumbar region 08/11/2020    Hip pain, acute, right 08/11/2020    Primary osteoarthritis of right hip 11/12/2020    Primary hypertension 10/26/2022    COPD, severe ([facility] 02/20/2023    Dyspnea on exertion 02/20/2023     Resolved Ambulatory Problems     Diagnosis Date Noted    No Resolved Ambulatory Problems     Past Medical History:   Diagnosis Date    CAD (coronary artery disease)     MRSA infection        Tobacco Use      Smoking status: Former        Types: Cigarettes      Smokeless tobacco: Never      Tobacco comments: cut back 3 months ago to 1/2 pack from 2 packs a day   Vital Signs   Pulse 84   Ht 5' 8\" (1.727 m)   Wt 275 lb (124.7 kg)   LMP 06/13/2018   SpO2 98%   BMI 41.81 kg/m²      Return in about 4 weeks (around 3/20/2023) for return in 4 weeks for in office PFT . No flowsheet data found. Tony Allison expressed understanding of the information we discussed. She is in agreement with the treatment plan of care. An electronic signature was used to authenticate this note.     --Berlin Palomino, HUMZA - CNP

## 2023-02-23 ENCOUNTER — TELEPHONE (OUTPATIENT)
Dept: PULMONOLOGY | Age: 53
End: 2023-02-23

## 2023-02-23 DIAGNOSIS — J44.9 COPD, SEVERE (HCC): Primary | ICD-10-CM

## 2023-02-23 RX ORDER — FLUTICASONE FUROATE, UMECLIDINIUM BROMIDE AND VILANTEROL TRIFENATATE 100; 62.5; 25 UG/1; UG/1; UG/1
1 POWDER RESPIRATORY (INHALATION) DAILY
Qty: 1 EACH | Refills: 5 | Status: SHIPPED | OUTPATIENT
Start: 2023-02-23

## 2023-02-23 NOTE — TELEPHONE ENCOUNTER
Patient was seen in office 02/20/23, she stated Seth Bach told her she would call in RX's for albuterol and for trelegy. Received the RX for albuterol but no RX for trelegy was sent. Please send RX to [address] [place] [address].

## 2023-02-27 ENCOUNTER — HOSPITAL ENCOUNTER (OUTPATIENT)
Dept: CT IMAGING | Age: 53
Discharge: HOME | End: 2023-02-27
Payer: COMMERCIAL

## 2023-02-27 ENCOUNTER — TELEPHONE (OUTPATIENT)
Dept: PULMONOLOGY | Age: 53
End: 2023-02-27

## 2023-02-27 ENCOUNTER — HOSPITAL ENCOUNTER (OUTPATIENT)
Age: 53
Discharge: HOME | End: 2023-02-27
Payer: COMMERCIAL

## 2023-02-27 DIAGNOSIS — J44.9 COPD, SEVERE (HCC): ICD-10-CM

## 2023-02-27 LAB
CREAT SERPL-MCNC: 0.9 MG/DL (ref 0.6–1.1)
GFR SERPL CREATININE-BSD FRML MDRD: >60 ML/MIN/1.73M2

## 2023-02-27 PROCEDURE — 82565 ASSAY OF CREATININE: CPT

## 2023-02-27 PROCEDURE — 36415 COLL VENOUS BLD VENIPUNCTURE: CPT

## 2023-02-27 PROCEDURE — 71250 CT THORAX DX C-: CPT

## 2023-02-27 NOTE — TELEPHONE ENCOUNTER
Pt called in stating she cannot afford the Trelegy that was called in. She has not tried other inhalers previously.

## 2023-02-27 NOTE — PROGRESS NOTES
I spoke with Florina today regarding her Trelegy prescription. Florina states that the medication is working well for her but that it is very expensive even after having insurance. I am providing Florina with a coupon for one free Ellipta and then looking in to why she has to still pay for the medication. Bettina  has a commercial insurance from her employer.        Trelegy coupon:   BIN# [id number]  [facility]# [id number]  PCNRadha Bowling  ID# [id number]  Expires 12/31/2023

## 2023-02-27 NOTE — PROGRESS NOTES
This tech called Jonathon Carreno and left a voicemail asking pt to call us back. Lung screening form wasn't given to pt. Radiologist from [facility] called asking to know when pt started smoking in order to calculate a packs per day number.

## 2023-02-28 NOTE — RESULT ENCOUNTER NOTE
I spoke with Raine Alejo regarding reviewing results. I will discuss the results once all information has been finalized.

## 2023-03-16 ENCOUNTER — APPOINTMENT (OUTPATIENT)
Dept: GENERAL RADIOLOGY | Age: 53
DRG: 192 | End: 2023-03-16
Payer: COMMERCIAL

## 2023-03-16 ENCOUNTER — HOSPITAL ENCOUNTER (INPATIENT)
Age: 53
LOS: 3 days | Discharge: HOME | DRG: 192 | End: 2023-03-19
Attending: EMERGENCY MEDICINE | Admitting: INTERNAL MEDICINE
Payer: COMMERCIAL

## 2023-03-16 DIAGNOSIS — J44.1 ACUTE EXACERBATION OF CHRONIC OBSTRUCTIVE PULMONARY DISEASE (COPD) (HCC): Primary | ICD-10-CM

## 2023-03-16 DIAGNOSIS — R06.00 DYSPNEA, UNSPECIFIED TYPE: ICD-10-CM

## 2023-03-16 LAB
ALBUMIN SERPL-MCNC: 4 GM/DL (ref 3.4–5)
ALP BLD-CCNC: 98 IU/L (ref 40–128)
ALT SERPL-CCNC: 19 U/L (ref 10–40)
ANION GAP SERPL CALCULATED.3IONS-SCNC: 11 MMOL/L (ref 4–16)
AST SERPL-CCNC: 24 IU/L (ref 15–37)
B PARAP IS1001 DNA NPH QL NAA+NON-PROBE: NOT DETECTED
B PERT.PT PRMT NPH QL NAA+NON-PROBE: NOT DETECTED
BASOPHILS ABSOLUTE: 0.1 K/CU MM
BASOPHILS RELATIVE PERCENT: 0.5 % (ref 0–1)
BILIRUB SERPL-MCNC: 0.2 MG/DL (ref 0–1)
BUN SERPL-MCNC: 17 MG/DL (ref 6–23)
C PNEUM DNA NPH QL NAA+NON-PROBE: NOT DETECTED
CALCIUM SERPL-MCNC: 9.2 MG/DL (ref 8.3–10.6)
CHLORIDE BLD-SCNC: 102 MMOL/L (ref 99–110)
CO2: 26 MMOL/L (ref 21–32)
CREAT SERPL-MCNC: 0.8 MG/DL (ref 0.6–1.1)
D DIMER: <200 NG/ML(DDU)
DIFFERENTIAL TYPE: ABNORMAL
EOSINOPHILS ABSOLUTE: 0.2 K/CU MM
EOSINOPHILS RELATIVE PERCENT: 1.7 % (ref 0–3)
FLUAV H1 2009 PAN RNA NPH NAA+NON-PROBE: NOT DETECTED
FLUAV H1 RNA NPH QL NAA+NON-PROBE: NOT DETECTED
FLUAV H3 RNA NPH QL NAA+NON-PROBE: NOT DETECTED
FLUAV RNA NPH QL NAA+NON-PROBE: NOT DETECTED
FLUBV RNA NPH QL NAA+NON-PROBE: NOT DETECTED
GFR SERPL CREATININE-BSD FRML MDRD: >60 ML/MIN/1.73M2
GLUCOSE SERPL-MCNC: 115 MG/DL (ref 70–99)
HADV DNA NPH QL NAA+NON-PROBE: NOT DETECTED
HCOV 229E RNA NPH QL NAA+NON-PROBE: NOT DETECTED
HCOV HKU1 RNA NPH QL NAA+NON-PROBE: NOT DETECTED
HCOV NL63 RNA NPH QL NAA+NON-PROBE: NOT DETECTED
HCOV OC43 RNA NPH QL NAA+NON-PROBE: NOT DETECTED
HCT VFR BLD CALC: 35 % (ref 37–47)
HEMOGLOBIN: 10.6 GM/DL (ref 12.5–16)
HMPV RNA NPH QL NAA+NON-PROBE: NOT DETECTED
HPIV1 RNA NPH QL NAA+NON-PROBE: NOT DETECTED
HPIV2 RNA NPH QL NAA+NON-PROBE: NOT DETECTED
HPIV3 RNA NPH QL NAA+NON-PROBE: NOT DETECTED
HPIV4 RNA NPH QL NAA+NON-PROBE: NOT DETECTED
IMMATURE NEUTROPHIL %: 0.5 % (ref 0–0.43)
LYMPHOCYTES ABSOLUTE: 2.7 K/CU MM
LYMPHOCYTES RELATIVE PERCENT: 25.2 % (ref 24–44)
M PNEUMO DNA NPH QL NAA+NON-PROBE: NOT DETECTED
MCH RBC QN AUTO: 25.9 PG (ref 27–31)
MCHC RBC AUTO-ENTMCNC: 30.3 % (ref 32–36)
MCV RBC AUTO: 85.6 FL (ref 78–100)
MONOCYTES ABSOLUTE: 0.8 K/CU MM
MONOCYTES RELATIVE PERCENT: 7.1 % (ref 0–4)
PDW BLD-RTO: 15.3 % (ref 11.7–14.9)
PLATELET # BLD: 382 K/CU MM (ref 140–440)
PMV BLD AUTO: 8.8 FL (ref 7.5–11.1)
POTASSIUM SERPL-SCNC: 3.6 MMOL/L (ref 3.5–5.1)
PRO-BNP: 105.3 PG/ML
RAPID INFLUENZA  B AGN: NEGATIVE
RAPID INFLUENZA A AGN: NEGATIVE
RBC # BLD: 4.09 M/CU MM (ref 4.2–5.4)
RSV RNA NPH QL NAA+NON-PROBE: NOT DETECTED
RV+EV RNA NPH QL NAA+NON-PROBE: NOT DETECTED
SARS-COV-2 RDRP RESP QL NAA+PROBE: NOT DETECTED
SARS-COV-2 RNA NPH QL NAA+NON-PROBE: NOT DETECTED
SEGMENTED NEUTROPHILS ABSOLUTE COUNT: 6.9 K/CU MM
SEGMENTED NEUTROPHILS RELATIVE PERCENT: 65 % (ref 36–66)
SODIUM BLD-SCNC: 139 MMOL/L (ref 135–145)
SOURCE: NORMAL
TOTAL IMMATURE NEUTOROPHIL: 0.05 K/CU MM
TOTAL PROTEIN: 7.3 GM/DL (ref 6.4–8.2)
WBC # BLD: 10.6 K/CU MM (ref 4–10.5)

## 2023-03-16 PROCEDURE — 2580000003 HC RX 258: Performed by: INTERNAL MEDICINE

## 2023-03-16 PROCEDURE — 85025 COMPLETE CBC W/AUTO DIFF WBC: CPT

## 2023-03-16 PROCEDURE — 6370000000 HC RX 637 (ALT 250 FOR IP): Performed by: INTERNAL MEDICINE

## 2023-03-16 PROCEDURE — 1200000000 HC SEMI PRIVATE

## 2023-03-16 PROCEDURE — 96374 THER/PROPH/DIAG INJ IV PUSH: CPT

## 2023-03-16 PROCEDURE — 99285 EMERGENCY DEPT VISIT HI MDM: CPT

## 2023-03-16 PROCEDURE — 85379 FIBRIN DEGRADATION QUANT: CPT

## 2023-03-16 PROCEDURE — 80053 COMPREHEN METABOLIC PANEL: CPT

## 2023-03-16 PROCEDURE — 94640 AIRWAY INHALATION TREATMENT: CPT

## 2023-03-16 PROCEDURE — 0202U NFCT DS 22 TRGT SARS-COV-2: CPT

## 2023-03-16 PROCEDURE — 87430 STREP A AG IA: CPT

## 2023-03-16 PROCEDURE — 83880 ASSAY OF NATRIURETIC PEPTIDE: CPT

## 2023-03-16 PROCEDURE — 6370000000 HC RX 637 (ALT 250 FOR IP): Performed by: EMERGENCY MEDICINE

## 2023-03-16 PROCEDURE — 87635 SARS-COV-2 COVID-19 AMP PRB: CPT

## 2023-03-16 PROCEDURE — 87804 INFLUENZA ASSAY W/OPTIC: CPT

## 2023-03-16 PROCEDURE — 6360000002 HC RX W HCPCS: Performed by: INTERNAL MEDICINE

## 2023-03-16 PROCEDURE — 71045 X-RAY EXAM CHEST 1 VIEW: CPT

## 2023-03-16 PROCEDURE — 93005 ELECTROCARDIOGRAM TRACING: CPT | Performed by: EMERGENCY MEDICINE

## 2023-03-16 PROCEDURE — 87081 CULTURE SCREEN ONLY: CPT

## 2023-03-16 PROCEDURE — 6360000002 HC RX W HCPCS: Performed by: EMERGENCY MEDICINE

## 2023-03-16 RX ORDER — IPRATROPIUM BROMIDE AND ALBUTEROL SULFATE 2.5; .5 MG/3ML; MG/3ML
1 SOLUTION RESPIRATORY (INHALATION) ONCE
Status: COMPLETED | OUTPATIENT
Start: 2023-03-16 | End: 2023-03-16

## 2023-03-16 RX ORDER — METHYLPREDNISOLONE SODIUM SUCCINATE 125 MG/2ML
125 INJECTION, POWDER, LYOPHILIZED, FOR SOLUTION INTRAMUSCULAR; INTRAVENOUS ONCE
Status: COMPLETED | OUTPATIENT
Start: 2023-03-16 | End: 2023-03-16

## 2023-03-16 RX ORDER — ACETAMINOPHEN 650 MG/1
650 SUPPOSITORY RECTAL EVERY 6 HOURS PRN
Status: DISCONTINUED | OUTPATIENT
Start: 2023-03-16 | End: 2023-03-19 | Stop reason: HOSPADM

## 2023-03-16 RX ORDER — SODIUM CHLORIDE 0.9 % (FLUSH) 0.9 %
5-40 SYRINGE (ML) INJECTION PRN
Status: DISCONTINUED | OUTPATIENT
Start: 2023-03-16 | End: 2023-03-19 | Stop reason: HOSPADM

## 2023-03-16 RX ORDER — ATORVASTATIN CALCIUM 40 MG/1
40 TABLET, FILM COATED ORAL DAILY
Status: DISCONTINUED | OUTPATIENT
Start: 2023-03-17 | End: 2023-03-19 | Stop reason: HOSPADM

## 2023-03-16 RX ORDER — ALBUTEROL SULFATE 90 UG/1
2 AEROSOL, METERED RESPIRATORY (INHALATION) EVERY 6 HOURS PRN
Status: DISCONTINUED | OUTPATIENT
Start: 2023-03-16 | End: 2023-03-19 | Stop reason: HOSPADM

## 2023-03-16 RX ORDER — ENOXAPARIN SODIUM 100 MG/ML
40 INJECTION SUBCUTANEOUS DAILY
Status: DISCONTINUED | OUTPATIENT
Start: 2023-03-17 | End: 2023-03-17

## 2023-03-16 RX ORDER — METHYLPREDNISOLONE SODIUM SUCCINATE 40 MG/ML
40 INJECTION, POWDER, LYOPHILIZED, FOR SOLUTION INTRAMUSCULAR; INTRAVENOUS EVERY 6 HOURS
Status: COMPLETED | OUTPATIENT
Start: 2023-03-17 | End: 2023-03-18

## 2023-03-16 RX ORDER — SODIUM CHLORIDE 0.9 % (FLUSH) 0.9 %
5-40 SYRINGE (ML) INJECTION EVERY 12 HOURS SCHEDULED
Status: DISCONTINUED | OUTPATIENT
Start: 2023-03-16 | End: 2023-03-19 | Stop reason: HOSPADM

## 2023-03-16 RX ORDER — ACETAMINOPHEN 325 MG/1
650 TABLET ORAL EVERY 6 HOURS PRN
Status: DISCONTINUED | OUTPATIENT
Start: 2023-03-16 | End: 2023-03-19 | Stop reason: HOSPADM

## 2023-03-16 RX ORDER — PREDNISONE 20 MG/1
40 TABLET ORAL DAILY
Status: DISCONTINUED | OUTPATIENT
Start: 2023-03-19 | End: 2023-03-19 | Stop reason: HOSPADM

## 2023-03-16 RX ORDER — ONDANSETRON 4 MG/1
4 TABLET, ORALLY DISINTEGRATING ORAL EVERY 8 HOURS PRN
Status: DISCONTINUED | OUTPATIENT
Start: 2023-03-16 | End: 2023-03-19 | Stop reason: HOSPADM

## 2023-03-16 RX ORDER — SODIUM CHLORIDE 9 MG/ML
INJECTION, SOLUTION INTRAVENOUS PRN
Status: DISCONTINUED | OUTPATIENT
Start: 2023-03-16 | End: 2023-03-19 | Stop reason: HOSPADM

## 2023-03-16 RX ORDER — BENZONATATE 100 MG/1
100 CAPSULE ORAL ONCE
Status: COMPLETED | OUTPATIENT
Start: 2023-03-16 | End: 2023-03-16

## 2023-03-16 RX ORDER — POLYETHYLENE GLYCOL 3350 17 G/17G
17 POWDER, FOR SOLUTION ORAL DAILY PRN
Status: DISCONTINUED | OUTPATIENT
Start: 2023-03-16 | End: 2023-03-19 | Stop reason: HOSPADM

## 2023-03-16 RX ORDER — IPRATROPIUM BROMIDE AND ALBUTEROL SULFATE 2.5; .5 MG/3ML; MG/3ML
1 SOLUTION RESPIRATORY (INHALATION)
Status: DISCONTINUED | OUTPATIENT
Start: 2023-03-16 | End: 2023-03-19 | Stop reason: HOSPADM

## 2023-03-16 RX ORDER — GUAIFENESIN/DEXTROMETHORPHAN 100-10MG/5
5 SYRUP ORAL ONCE
Status: COMPLETED | OUTPATIENT
Start: 2023-03-16 | End: 2023-03-16

## 2023-03-16 RX ORDER — ASPIRIN 81 MG/1
81 TABLET ORAL DAILY
Status: DISCONTINUED | OUTPATIENT
Start: 2023-03-17 | End: 2023-03-19 | Stop reason: HOSPADM

## 2023-03-16 RX ORDER — ONDANSETRON 2 MG/ML
4 INJECTION INTRAMUSCULAR; INTRAVENOUS EVERY 6 HOURS PRN
Status: DISCONTINUED | OUTPATIENT
Start: 2023-03-16 | End: 2023-03-19 | Stop reason: HOSPADM

## 2023-03-16 RX ADMIN — IPRATROPIUM BROMIDE AND ALBUTEROL SULFATE 1 AMPULE: 2.5; .5 SOLUTION RESPIRATORY (INHALATION) at 19:50

## 2023-03-16 RX ADMIN — BENZONATATE 100 MG: 100 CAPSULE ORAL at 20:48

## 2023-03-16 RX ADMIN — SODIUM CHLORIDE, PRESERVATIVE FREE 10 ML: 5 INJECTION INTRAVENOUS at 23:52

## 2023-03-16 RX ADMIN — METHYLPREDNISOLONE SODIUM SUCCINATE 125 MG: 125 INJECTION, POWDER, FOR SOLUTION INTRAMUSCULAR; INTRAVENOUS at 20:48

## 2023-03-16 RX ADMIN — GUAIFENESIN AND DEXTROMETHORPHAN 5 ML: 100; 10 SYRUP ORAL at 20:48

## 2023-03-16 RX ADMIN — AZITHROMYCIN MONOHYDRATE 500 MG: 500 INJECTION, POWDER, LYOPHILIZED, FOR SOLUTION INTRAVENOUS at 23:51

## 2023-03-16 RX ADMIN — IPRATROPIUM BROMIDE AND ALBUTEROL SULFATE 1 AMPULE: 2.5; .5 SOLUTION RESPIRATORY (INHALATION) at 19:45

## 2023-03-16 RX ADMIN — METOPROLOL TARTRATE 25 MG: 25 TABLET, FILM COATED ORAL at 23:52

## 2023-03-16 NOTE — ED TRIAGE NOTES
Patient to triage with c/o shortness of breath that started pprox. 5 days ago. States it started with a scratchy throat and has progressed to shortness of breath and cough. States she is coughing up yellowish white mucous. Resps even and unlabored. Patient is short of breath on exertion.

## 2023-03-17 LAB
EKG ATRIAL RATE: 90 BPM
EKG DIAGNOSIS: NORMAL
EKG Q-T INTERVAL: 400 MS
EKG QRS DURATION: 70 MS
EKG QTC CALCULATION (BAZETT): 492 MS
EKG R AXIS: 107 DEGREES
EKG T AXIS: 81 DEGREES
EKG VENTRICULAR RATE: 91 BPM

## 2023-03-17 PROCEDURE — 6360000002 HC RX W HCPCS: Performed by: INTERNAL MEDICINE

## 2023-03-17 PROCEDURE — 93010 ELECTROCARDIOGRAM REPORT: CPT | Performed by: INTERNAL MEDICINE

## 2023-03-17 PROCEDURE — 2700000000 HC OXYGEN THERAPY PER DAY

## 2023-03-17 PROCEDURE — 94761 N-INVAS EAR/PLS OXIMETRY MLT: CPT

## 2023-03-17 PROCEDURE — 94640 AIRWAY INHALATION TREATMENT: CPT

## 2023-03-17 PROCEDURE — 1200000000 HC SEMI PRIVATE

## 2023-03-17 PROCEDURE — 6370000000 HC RX 637 (ALT 250 FOR IP): Performed by: INTERNAL MEDICINE

## 2023-03-17 PROCEDURE — 2580000003 HC RX 258: Performed by: INTERNAL MEDICINE

## 2023-03-17 RX ORDER — BUDESONIDE AND FORMOTEROL FUMARATE DIHYDRATE 160; 4.5 UG/1; UG/1
2 AEROSOL RESPIRATORY (INHALATION) 2 TIMES DAILY
Status: DISCONTINUED | OUTPATIENT
Start: 2023-03-17 | End: 2023-03-19 | Stop reason: HOSPADM

## 2023-03-17 RX ORDER — ENOXAPARIN SODIUM 100 MG/ML
30 INJECTION SUBCUTANEOUS 2 TIMES DAILY
Status: DISCONTINUED | OUTPATIENT
Start: 2023-03-17 | End: 2023-03-19 | Stop reason: HOSPADM

## 2023-03-17 RX ADMIN — BUDESONIDE AND FORMOTEROL FUMARATE DIHYDRATE 2 PUFF: 160; 4.5 AEROSOL RESPIRATORY (INHALATION) at 07:56

## 2023-03-17 RX ADMIN — ENOXAPARIN SODIUM 30 MG: 100 INJECTION SUBCUTANEOUS at 21:14

## 2023-03-17 RX ADMIN — METOPROLOL TARTRATE 25 MG: 25 TABLET, FILM COATED ORAL at 21:14

## 2023-03-17 RX ADMIN — IPRATROPIUM BROMIDE AND ALBUTEROL SULFATE 1 AMPULE: 2.5; .5 SOLUTION RESPIRATORY (INHALATION) at 15:30

## 2023-03-17 RX ADMIN — IPRATROPIUM BROMIDE AND ALBUTEROL SULFATE 1 AMPULE: 2.5; .5 SOLUTION RESPIRATORY (INHALATION) at 11:35

## 2023-03-17 RX ADMIN — ASPIRIN 81 MG: 81 TABLET, COATED ORAL at 08:52

## 2023-03-17 RX ADMIN — METHYLPREDNISOLONE SODIUM SUCCINATE 40 MG: 40 INJECTION, POWDER, FOR SOLUTION INTRAMUSCULAR; INTRAVENOUS at 15:25

## 2023-03-17 RX ADMIN — TIOTROPIUM BROMIDE INHALATION SPRAY 2 PUFF: 3.12 SPRAY, METERED RESPIRATORY (INHALATION) at 07:56

## 2023-03-17 RX ADMIN — METHYLPREDNISOLONE SODIUM SUCCINATE 40 MG: 40 INJECTION, POWDER, FOR SOLUTION INTRAMUSCULAR; INTRAVENOUS at 02:57

## 2023-03-17 RX ADMIN — METOPROLOL TARTRATE 25 MG: 25 TABLET, FILM COATED ORAL at 08:52

## 2023-03-17 RX ADMIN — METHYLPREDNISOLONE SODIUM SUCCINATE 40 MG: 40 INJECTION, POWDER, FOR SOLUTION INTRAMUSCULAR; INTRAVENOUS at 08:51

## 2023-03-17 RX ADMIN — IPRATROPIUM BROMIDE AND ALBUTEROL SULFATE 1 AMPULE: 2.5; .5 SOLUTION RESPIRATORY (INHALATION) at 07:56

## 2023-03-17 RX ADMIN — IPRATROPIUM BROMIDE AND ALBUTEROL SULFATE 1 AMPULE: 2.5; .5 SOLUTION RESPIRATORY (INHALATION) at 20:42

## 2023-03-17 RX ADMIN — SODIUM CHLORIDE, PRESERVATIVE FREE 10 ML: 5 INJECTION INTRAVENOUS at 08:54

## 2023-03-17 RX ADMIN — BUDESONIDE AND FORMOTEROL FUMARATE DIHYDRATE 2 PUFF: 160; 4.5 AEROSOL RESPIRATORY (INHALATION) at 20:43

## 2023-03-17 RX ADMIN — ENOXAPARIN SODIUM 30 MG: 100 INJECTION SUBCUTANEOUS at 08:52

## 2023-03-17 RX ADMIN — SODIUM CHLORIDE, PRESERVATIVE FREE 10 ML: 5 INJECTION INTRAVENOUS at 21:14

## 2023-03-17 RX ADMIN — IPRATROPIUM BROMIDE AND ALBUTEROL SULFATE 1 AMPULE: 2.5; .5 SOLUTION RESPIRATORY (INHALATION) at 01:23

## 2023-03-17 RX ADMIN — METHYLPREDNISOLONE SODIUM SUCCINATE 40 MG: 40 INJECTION, POWDER, FOR SOLUTION INTRAMUSCULAR; INTRAVENOUS at 21:14

## 2023-03-17 RX ADMIN — ATORVASTATIN CALCIUM 40 MG: 40 TABLET, FILM COATED ORAL at 08:52

## 2023-03-17 ASSESSMENT — PAIN DESCRIPTION - LOCATION
LOCATION: BACK
LOCATION: BACK

## 2023-03-17 ASSESSMENT — ENCOUNTER SYMPTOMS
WHEEZING: 1
NAUSEA: 0
VOMITING: 0
ABDOMINAL PAIN: 0
EYE DISCHARGE: 0
DIARRHEA: 0
VOICE CHANGE: 0
SINUS PRESSURE: 0
BLOOD IN STOOL: 0
BACK PAIN: 0
COUGH: 1
SHORTNESS OF BREATH: 1
EYE PAIN: 0
SINUS PAIN: 0
CHEST TIGHTNESS: 0

## 2023-03-17 ASSESSMENT — PAIN SCALES - GENERAL
PAINLEVEL_OUTOF10: 3
PAINLEVEL_OUTOF10: 2

## 2023-03-17 NOTE — CARE COORDINATION
MCG criteria for COPD exacerbation reviewed at this time, criteria supports Inpatient Admission.  DEREK,RN/CM

## 2023-03-17 NOTE — PROGRESS NOTES
V2.0  [facility] [place] Hospitalist Progress Note      Name:  John Rush /Age/Sex: 1970  (47 y.o. female)   MRN & CSN:  [id number] & [id number] Encounter Date/Time: 3/17/2023 3:37 PM EDT    Location:  4194/1502-D PCP: Dotty Antony MD       Hospital Day: 2    Assessment and Plan:   John Rush is a 47 y.o. female with pmh of COPD, HTN, CAD who presents with COPD exacerbation ([address]      Plan:  #. COPD exacerbation  -recent viral infection  -Patient's oxygen saturation 92% on room air.  -Continue IV steroids, bronchodilator therapy, azithromycin.  -Chest x-ray-no acute process,  -Influenza A&B, rapid COVID-negative.  -Respiratory viral panel negative. Still symptomatic     #. Coronary artery disease  -[facility]-2019-stent to LAD  -Patient is on aspirin, atorvastatin, metoprolol. #.  Hypertension-on metoprolol. #.  COPD, not on home oxygen.  -Patient is on Trelegy, albuterol HFA as needed. -Pulmonologist- Dr Missael Mcgrath     #. History of right hip replacement-2021     #. Severe obesity with BMI 39.97. #.  History of tobacco dependence-quit 2 months ago. Diet ADULT DIET; Regular; Low Sodium (2 gm)   DVT Prophylaxis [] Lovenox, []  Heparin, [] SCDs, [] Ambulation,  [] Eliquis, [] Xarelto  [] Coumadin   Code Status Full Code   Disposition From: Home  Expected Disposition: Home  Estimated Date of Discharge: 2-3 days   Patient requires continued admission due to COPD exacerbation   Surrogate Decision Maker/ POA      Subjective:     Chief Complaint: Shortness of Breath     The patient was seen at the bedside patient still wheezy and coughing. Symptomatic will be transferred another fall at the patient needs to have found his pulse ox. Will be transferred to 3 E orders will be placed         Review of Systems:    Review of Systems   Constitutional:  Negative for appetite change, chills, fever and unexpected weight change.    HENT:  Negative for ear pain, hearing loss, sinus pressure, sinus pain and voice change. Eyes:  Negative for pain, discharge and visual disturbance. Respiratory:  Positive for cough, shortness of breath and wheezing. Negative for chest tightness. Cardiovascular:  Positive for leg swelling. Negative for chest pain and palpitations. Gastrointestinal:  Negative for abdominal pain, blood in stool, diarrhea, nausea and vomiting. Genitourinary:  Negative for dysuria and frequency. Musculoskeletal:  Positive for arthralgias. Negative for back pain. Neurological:  Positive for weakness. Negative for dizziness, light-headedness and headaches. Psychiatric/Behavioral:  Negative for sleep disturbance. Objective:   No intake or output data in the 24 hours ending 03/17/23 1537     Vitals:   Vitals:    03/17/23 1531   BP:    Pulse:    Resp:    Temp:    SpO2: 93%       Physical Exam:   Physical Exam  Vitals reviewed. Constitutional:       Appearance: Normal appearance. She is normal weight. HENT:      Head: Normocephalic. Nose: Nose normal.      Mouth/Throat:      Mouth: Mucous membranes are moist.   Eyes:      Conjunctiva/sclera: Conjunctivae normal.      Pupils: Pupils are equal, round, and reactive to light. Cardiovascular:      Rate and Rhythm: Normal rate and regular rhythm. Pulses: Normal pulses. Heart sounds: Normal heart sounds. No murmur heard. Pulmonary:      Effort: Respiratory distress present. Breath sounds: Wheezing present. No rhonchi or rales. Abdominal:      General: Abdomen is flat. Bowel sounds are normal. There is no distension. Palpations: Abdomen is soft. Tenderness: There is no abdominal tenderness. Musculoskeletal:         General: No deformity. Normal range of motion. Cervical back: Normal range of motion and neck supple. Right lower leg: No edema. Left lower leg: No edema. Skin:     Coloration: Skin is not jaundiced or pale. Neurological:      General: No focal deficit present.       Mental Status: She is alert and oriented to person, place, and time. Mental status is at baseline. Motor: Weakness present.    Psychiatric:         Mood and Affect: Mood normal.         Behavior: Behavior normal.          Medications:   Medications:    enoxaparin  30 mg SubCUTAneous BID    budesonide-formoterol  2 puff Inhalation BID    And    tiotropium  2 puff Inhalation Daily    sodium chloride flush  5-40 mL IntraVENous 2 times per day    ipratropium-albuterol  1 ampule Inhalation Q4H WA    methylPREDNISolone  40 mg IntraVENous Q6H    Followed by    Ryan Helling ON 3/19/2023] predniSONE  40 mg Oral Daily    azithromycin  500 mg IntraVENous Q24H    aspirin EC  81 mg Oral Daily    atorvastatin  40 mg Oral Daily    metoprolol tartrate  25 mg Oral BID      Infusions:    sodium chloride       PRN Meds: sodium chloride flush, 5-40 mL, PRN  sodium chloride, , PRN  ondansetron, 4 mg, Q8H PRN   Or  ondansetron, 4 mg, Q6H PRN  polyethylene glycol, 17 g, Daily PRN  acetaminophen, 650 mg, Q6H PRN   Or  acetaminophen, 650 mg, Q6H PRN  albuterol sulfate HFA, 2 puff, Q6H PRN        Labs      Recent Results (from the past 24 hour(s))   EKG 12 Lead    Collection Time: 03/16/23  4:14 PM   Result Value Ref Range    Ventricular Rate 91 BPM    Atrial Rate 90 BPM    QRS Duration 70 ms    Q-T Interval 400 ms    QTc Calculation (Bazett) 492 ms    R Axis 107 degrees    T Axis 81 degrees    Diagnosis       Accelerated Junctional rhythm  Rightward axis  Low voltage QRS  ST & T wave abnormality, consider anterior ischemia  Abnormal ECG  When compared with ECG of 21-OCT-2022 01:47,  Junctional rhythm has replaced Sinus rhythm  (RBBB and left posterior fascicular block) is no longer present     COVID-19, Rapid    Collection Time: 03/16/23  5:15 PM    Specimen: Nasopharyngeal   Result Value Ref Range    Source NARES     SARS-CoV-2, NAAT NOT DETECTED NOT DETECTED   Rapid Flu Swab    Collection Time: 03/16/23  5:15 PM    Specimen: Nasopharyngeal   Result Value Ref Range    Rapid Influenza A Ag NEGATIVE NEGATIVE    Rapid Influenza B Ag NEGATIVE NEGATIVE   Strep Screen Group A  - Throat    Collection Time: 03/16/23  5:15 PM    Specimen: Throat   Result Value Ref Range    Specimen THROAT     Special Requests NONE     Strep A Direct Screen NEGATIVE    CBC with Auto Differential    Collection Time: 03/16/23  7:40 PM   Result Value Ref Range    WBC 10.6 (H) 4.0 - 10.5 K/CU MM    RBC 4.09 (L) 4.2 - 5.4 M/CU MM    Hemoglobin 10.6 (L) 12.5 - 16.0 GM/DL    Hematocrit 35.0 (L) 37 - 47 %    MCV 85.6 78 - 100 FL    MCH 25.9 (L) 27 - 31 PG    MCHC 30.3 (L) 32.0 - 36.0 %    RDW 15.3 (H) 11.7 - 14.9 %    Platelets 780 722 - 662 K/CU MM    MPV 8.8 7.5 - 11.1 FL    Immature Neutrophil % 0.5 (H) 0 - 0.43 %    Segs Relative 65.0 36 - 66 %    Eosinophils % 1.7 0 - 3 %    Basophils % 0.5 0 - 1 %    Lymphocytes % 25.2 24 - 44 %    Monocytes % 7.1 (H) 0 - 4 %    Total Immature Neutrophil 0.05 K/CU MM    Segs Absolute 6.9 K/CU MM    Eosinophils Absolute 0.2 K/CU MM    Basophils Absolute 0.1 K/CU MM    Lymphocytes Absolute 2.7 K/CU MM    Monocytes Absolute 0.8 K/CU MM    Differential Type AUTOMATED DIFFERENTIAL    Comprehensive Metabolic Panel    Collection Time: 03/16/23  7:40 PM   Result Value Ref Range    Sodium 139 135 - 145 MMOL/L    Potassium 3.6 3.5 - 5.1 MMOL/L    Chloride 102 99 - 110 mMol/L    CO2 26 21 - 32 MMOL/L    BUN 17 6 - 23 MG/DL    Creatinine 0.8 0.6 - 1.1 MG/DL    Est, Glom Filt Rate >60 >60 mL/min/1.73m2    Glucose 115 (H) 70 - 99 MG/DL    Calcium 9.2 8.3 - 10.6 MG/DL    Albumin 4.0 3.4 - 5.0 GM/DL    Total Protein 7.3 6.4 - 8.2 GM/DL    Total Bilirubin 0.2 0.0 - 1.0 MG/DL    ALT 19 10 - 40 U/L    AST 24 15 - 37 IU/L    Alkaline Phosphatase 98 40 - 128 IU/L    Anion Gap 11 4 - 16   Brain Natriuretic Peptide    Collection Time: 03/16/23  7:40 PM   Result Value Ref Range    Pro-.3 <300 PG/ML   D-Dimer, Quantitative    Collection Time: 03/16/23  7:40 PM   Result Value Ref Range    D-Dimer, Quant <200 <230 NG/mL(DDU)   Respiratory Panel, Molecular, with COVID-19 (Restricted: peds pts or suitable admitted adults)    Collection Time: 03/16/23 10:35 PM    Specimen: Nasopharyngeal   Result Value Ref Range    Adenovirus Detection by PCR NOT DETECTED NOT DETECTED    Coronavirus 229E PCR NOT DETECTED NOT DETECTED    Coronavirus HKU1 PCR NOT DETECTED NOT DETECTED    Coronavirus NL63 PCR NOT DETECTED NOT DETECTED    Coronavirus OC43 PCR NOT DETECTED NOT DETECTED    SARS-CoV-2 NOT DETECTED NOT DETECTED    Human Metapneumovirus PCR NOT DETECTED NOT DETECTED    Rhinovirus Enterovirus PCR NOT DETECTED NOT DETECTED    Influenza A by PCR NOT DETECTED NOT DETECTED    Influenza A H1 Pandemic PCR NOT DETECTED NOT DETECTED    Influenza A H1 (2009) PCR NOT DETECTED NOT DETECTED    Influenza A H3 PCR NOT DETECTED NOT DETECTED    Influenza B by PCR NOT DETECTED NOT DETECTED    Parainfluenza 1 PCR NOT DETECTED NOT DETECTED    Parainfluenza 2 PCR NOT DETECTED NOT DETECTED    Parainfluenza 3 PCR NOT DETECTED NOT DETECTED    Parainfluenza 4 PCR NOT DETECTED NOT DETECTED    RSV PCR NOT DETECTED NOT DETECTED    Bordetella parapertussis by PCR NOT DETECTED NOT DETECTED    B Pertussis by PCR NOT DETECTED NOT DETECTED    Chlamydophila Pneumonia PCR NOT DETECTED NOT DETECTED    Mycoplasma pneumo by PCR NOT DETECTED NOT DETECTED        Imaging/Diagnostics Last 24 Hours   XR CHEST PORTABLE    Result Date: 3/16/2023  EXAMINATION: ONE XRAY VIEW OF THE CHEST 3/16/2023 5:30 pm COMPARISON: CT chest February 27, 2023; chest x-ray October 21, 2022 HISTORY: ORDERING SYSTEM PROVIDED HISTORY: SOB TECHNOLOGIST PROVIDED HISTORY: Reason for exam:->SOB Reason for Exam: SOB Additional signs and symptoms: none Relevant Medical/Surgical History: CAD FINDINGS: Cardiac silhouette is stable. No focal consolidation, pleural effusion, or pneumothorax identified. Osseous structures appear intact.      1. No acute cardiopulmonary process is identified.        Electronically signed by Lindy Hansen MD on 3/17/2023 at 3:37 PM

## 2023-03-17 NOTE — PROGRESS NOTES
Patient being transferred to [facility] for continuous monitoring.    Monitored via pulse ox due to no continuous monitoring

## 2023-03-17 NOTE — PROGRESS NOTES
4 Eyes Skin Assessment     NAME:  Babatunde Menon  YOB: 1970  MEDICAL RECORD NUMBER:  [id number]    The patient is being assessed for  Admission    I agree that One RN has performed a thorough Head to Toe Skin Assessment on the patient. ALL assessment sites listed below have been assessed. Areas assessed by both nurses:    Head, Face, Ears, Shoulders, Back, Chest, Arms, Elbows, Hands, Sacrum. Buttock, Coccyx, Ischium, and Legs. Feet and Heels        Does the Patient have a Wound?  No noted wound(s)       Eulogio Prevention initiated by RN: Yes   Wound Care Orders initiated by RN: No    Pressure Injury (Stage 3,4, Unstageable, DTI, NWPT, and Complex wounds) if present, place referral order by RN under : No    New and Established Ostomies, if present place, referral order under : No      Nurse 1 eSignature: Electronically signed by Beverly Reddy RN on 3/17/23 at 12:52 AM EDT    **SHARE this note so that the co-signing nurse can place an eSignature**    Nurse 2 eSignature: Electronically signed by Wisam Dias RN on 3/17/23 at 1:21 AM EDT

## 2023-03-17 NOTE — ED PROVIDER NOTES
Emergency Department Encounter    Patient: Meera Wright  MRN: [id number]  : 1970  Date of Evaluation: 3/16/2023  ED Provider:  Luis Felipe Fitzpatrick DO    Triage Chief Complaint:   Shortness of Breath    [place]:  Meera Wright is a 47 y.o. female that presents ***    ROS - see HPI, below listed is current ROS at time of my eval:  General:  No fevers, no chills, no weakness  Eyes:  No recent vison changes, no discharge  ENT:  No sore throat, no nasal congestion, no hearing changes  Cardiovascular:  No chest pain, no palpitations  Respiratory:  No shortness of breath, no cough, no wheezing  Gastrointestinal:  No pain, no nausea, no vomiting, no diarrhea  Musculoskeletal:  No muscle pain, no joint pain  Skin:  No rash, no pruritis, no easy bruising  Neurologic:  No speech problems, no headache, no extremity numbness, no extremity tingling, no extremity weakness  Psychiatric:  No anxiety  Genitourinary:  No dysuria, no hematuria  Endocrine:  No unexpected weight gain, no unexpected weight loss  Extremities:  no edema, no pain    Past Medical History:   Diagnosis Date    CAD (coronary artery disease)     MRSA infection      Past Surgical History:   Procedure Laterality Date    BACK SURGERY      CHOLECYSTECTOMY      CORONARY ANGIOPLASTY WITH STENT PLACEMENT      JOINT REPLACEMENT Right     SKIN BIOPSY      16 mrsa spot removals, head shoulder stomach back     Family History   Problem Relation Age of Onset    Diabetes Father     Coronary Art Dis Father     Heart Disease Father     High Cholesterol Father     High Blood Pressure Father     Arthritis Sister     Asthma Sister      Social History     Socioeconomic History    Marital status: Legally      Spouse name: Karol Gotti    Number of children: 1    Years of education: 12    Highest education level: Not on file   Occupational History    Not on file   Tobacco Use    Smoking status: Former     Types: Cigarettes    Smokeless tobacco: Never    Tobacco comments:     cut back 3 months ago to 1/2 pack from 2 packs a day   Vaping Use    Vaping Use: Never used   Substance and Sexual Activity    Alcohol use: Not Currently     Comment: occasional    Drug use: Yes     Frequency: 2.0 times per week     Types: Marijuana [place])     Comment: none since heart attack     Sexual activity: Not Currently   Other Topics Concern    Not on file   Social History Narrative    Not on file     Social Determinants of Health     Financial Resource Strain: Not on file   Food Insecurity: Not on file   Transportation Needs: Not on file   Physical Activity: Not on file   Stress: Not on file   Social Connections: Not on file   Intimate Partner Violence: Not on file   Housing Stability: Not on file     No current facility-administered medications for this encounter. Current Outpatient Medications   Medication Sig Dispense Refill    fluticasone-umeclidin-vilant (TRELEGY ELLIPTA) 100-62.5-25 MCG/ACT AEPB inhaler Inhale 1 puff into the lungs daily 1 each 5    albuterol sulfate HFA (PROVENTIL;VENTOLIN;PROAIR) 108 (90 Base) MCG/ACT inhaler Inhale 2 puffs into the lungs every 6 hours as needed for Wheezing or Shortness of Breath Rinse mouth after use 1 each 5    aspirin EC 81 MG EC tablet Take 1 tablet by mouth daily 30 tablet 0    atorvastatin (LIPITOR) 40 MG tablet Take 1 tablet by mouth daily 30 tablet 3    metoprolol tartrate (LOPRESSOR) 50 MG tablet Take 0.5 tablets by mouth 2 times daily 60 tablet 3    nitroGLYCERIN (NITROSTAT) 0.3 MG SL tablet Place 1 tablet under the tongue every 5 minutes as needed for Chest pain up to max of 3 total doses.  If no relief after 1 dose, call 911. 30 tablet 0    fluticasone-umeclidin-vilant (TRELEGY ELLIPTA) 100-62.5-25 MCG/INH AEPB Inhale 1 puff into the lungs daily 1 each 5    Spacer/Aero-Holding Chambers QUINTON 1 Device by Does not apply route daily as needed (use with MDI inhaler) 1 Device 0     No Known Allergies    Nursing Notes Reviewed    Physical Exam:  Triage VS:    ED Triage Vitals [03/16/23 1608]   Enc Vitals Group      /77      Heart Rate 90      Resp 18      Temp 98.5 °F (36.9 °C)      Temp Source Oral      SpO2 95 %      Weight       Height       Head Circumference       Peak Flow       Pain Score       Pain Loc       Pain Edu? Excl. in [address]? My pulse ox interpretation is - normal    General appearance:  No acute distress. Skin:  Warm. Dry. Eye:  Extraocular movements intact. Ears, nose, mouth and throat:  Oral mucosa moist   Neck:  Trachea midline. Extremity:  No swelling. Normal ROM     Heart:  Regular rate and rhythm, normal S1 & S2, no extra heart sounds. Perfusion:  intact  Respiratory:  Lungs clear to auscultation bilaterally. Respirations nonlabored. Abdominal:  Normal bowel sounds. Soft. Nontender. Non distended. Back:  No CVA tenderness to palpation     Neurological:  Alert and oriented times 3. No focal neuro deficits.              Psychiatric:  Appropriate    I have reviewed and interpreted all of the currently available lab results from this visit (if applicable):  Results for orders placed or performed during the hospital encounter of 03/16/23   COVID-19, Rapid    Specimen: Nasopharyngeal   Result Value Ref Range    Source NARES     SARS-CoV-2, NAAT NOT DETECTED NOT DETECTED   Rapid Flu Swab    Specimen: Nasopharyngeal   Result Value Ref Range    Rapid Influenza A Ag NEGATIVE NEGATIVE    Rapid Influenza B Ag NEGATIVE NEGATIVE   Strep Screen Group A  - Throat    Specimen: Throat   Result Value Ref Range    Specimen THROAT     Special Requests NONE     Strep A Direct Screen NEGATIVE    CBC with Auto Differential   Result Value Ref Range    WBC 10.6 (H) 4.0 - 10.5 K/CU MM    RBC 4.09 (L) 4.2 - 5.4 M/CU MM    Hemoglobin 10.6 (L) 12.5 - 16.0 GM/DL    Hematocrit 35.0 (L) 37 - 47 %    MCV 85.6 78 - 100 FL    MCH 25.9 (L) 27 - 31 PG    MCHC 30.3 (L) 32.0 - 36.0 %    RDW 15.3 (H) 11.7 - 14.9 %    Platelets 382 140 - 440 K/CU MM    MPV 8.8 7.5 - 11.1 FL    Immature Neutrophil % 0.5 (H) 0 - 0.43 %    Segs Relative 65.0 36 - 66 %    Eosinophils % 1.7 0 - 3 %    Basophils % 0.5 0 - 1 %    Lymphocytes % 25.2 24 - 44 %    Monocytes % 7.1 (H) 0 - 4 %    Total Immature Neutrophil 0.05 K/CU MM    Segs Absolute 6.9 K/CU MM    Eosinophils Absolute 0.2 K/CU MM    Basophils Absolute 0.1 K/CU MM    Lymphocytes Absolute 2.7 K/CU MM    Monocytes Absolute 0.8 K/CU MM    Differential Type AUTOMATED DIFFERENTIAL    Comprehensive Metabolic Panel   Result Value Ref Range    Sodium 139 135 - 145 MMOL/L    Potassium 3.6 3.5 - 5.1 MMOL/L    Chloride 102 99 - 110 mMol/L    CO2 26 21 - 32 MMOL/L    BUN 17 6 - 23 MG/DL    Creatinine 0.8 0.6 - 1.1 MG/DL    Est, Glom Filt Rate >60 >60 mL/min/1.73m2    Glucose 115 (H) 70 - 99 MG/DL    Calcium 9.2 8.3 - 10.6 MG/DL    Albumin 4.0 3.4 - 5.0 GM/DL    Total Protein 7.3 6.4 - 8.2 GM/DL    Total Bilirubin 0.2 0.0 - 1.0 MG/DL    ALT 19 10 - 40 U/L    AST 24 15 - 37 IU/L    Alkaline Phosphatase 98 40 - 128 IU/L    Anion Gap 11 4 - 16   Brain Natriuretic Peptide   Result Value Ref Range    Pro-.3 <300 PG/ML   D-Dimer, Quantitative   Result Value Ref Range    D-Dimer, Quant <200 <230 NG/mL(DDU)      Radiographs (if obtained):  Radiologist's Report Reviewed:  XR CHEST PORTABLE    Result Date: 3/16/2023  EXAMINATION: ONE XRAY VIEW OF THE CHEST 3/16/2023 5:30 pm COMPARISON: CT chest February 27, 2023; chest x-ray October 21, 2022 HISTORY: ORDERING SYSTEM PROVIDED HISTORY: SOB TECHNOLOGIST PROVIDED HISTORY: Reason for exam:->SOB Reason for Exam: SOB Additional signs and symptoms: none Relevant Medical/Surgical History: CAD FINDINGS: Cardiac silhouette is stable. No focal consolidation, pleural effusion, or pneumothorax identified. Osseous structures appear intact. 1. No acute cardiopulmonary process is identified.        EKG (if obtained): (All EKG's are interpreted by myself in the absence of a cardiologist)      MDM:  ***    Clinical Impression:  1. Acute exacerbation of chronic obstructive pulmonary disease (COPD) ([facility] [address]    2. Dyspnea, unspecified type      Disposition referral (if applicable):  No follow-up provider specified. Disposition medications (if applicable):  New Prescriptions    No medications on file     ED Provider Disposition Time  DISPOSITION Decision To Admit 03/16/2023 09:17:14 PM      Comment: Please note this report has been produced using speech recognition software and may contain errors related to that system including errors in grammar, punctuation, and spelling, as well as words and phrases that may be inappropriate. Efforts were made to edit the dictations.

## 2023-03-17 NOTE — PROGRESS NOTES
4 Eyes Skin Assessment     NAME:  Fabian Hudson  YOB: 1970  MEDICAL RECORD NUMBER:  [id number]    The patient is being assessed for  Transfer to New Unit    I agree that One RN has performed a thorough Head to Toe Skin Assessment on the patient. ALL assessment sites listed below have been assessed. Areas assessed by both nurses:    Head, Face, Ears, Shoulders, Back, Chest, Arms, Elbows, Hands, Sacrum. Buttock, Coccyx, Ischium, and Legs. Feet and Heels        Does the Patient have a Wound?  No noted wound(s)       Eulogio Prevention initiated by RN: Yes   Wound Care Orders initiated by RN: No    Pressure Injury (Stage 3,4, Unstageable, DTI, NWPT, and Complex wounds) if present, place referral order by RN under : No    New and Established Ostomies, if present place, referral order under : No      Nurse 1 eSignature: Electronically signed by Wanetta Olszewski on 3/17/23 at 6:43 PM EDT    **SHARE this note so that the co-signing nurse can place an eSignature**    Nurse 2 eSignature: Electronically signed by Frank De Santiago RN on 3/17/23 at 7:22 PM EDT

## 2023-03-17 NOTE — ED NOTES
ED TO INPATIENT SBAR HANDOFF    Patient Name: Shira Deal   :  1970  47 y.o. MRN:  [id number]  Preferred Name    ED Room #:  ED09/ED-09  Family/Caregiver Present no   Restraints no   Sitter no   Sepsis Risk Score Sepsis Risk Score: 0.87    Situation  Code Status: Prior No additional code details. Allergies: Patient has no known allergies. Weight: No data found. Arrived from: home  Chief Complaint:   Chief Complaint   Patient presents with    Shortness of Breath     Imaging:   XR CHEST PORTABLE   Final Result   1. No acute cardiopulmonary process is identified.            Abnormal labs:   Abnormal Labs Reviewed   CBC WITH AUTO DIFFERENTIAL - Abnormal; Notable for the following components:       Result Value    WBC 10.6 (*)     RBC 4.09 (*)     Hemoglobin 10.6 (*)     Hematocrit 35.0 (*)     MCH 25.9 (*)     MCHC 30.3 (*)     RDW 15.3 (*)     Immature Neutrophil % 0.5 (*)     Monocytes % 7.1 (*)     All other components within normal limits   COMPREHENSIVE METABOLIC PANEL - Abnormal; Notable for the following components:    Glucose 115 (*)     All other components within normal limits     Critical values: no     Abnormal Assessment Findings: SOB, Exp wheezes despite duonebs    Background  History:   Past Medical History:   Diagnosis Date    CAD (coronary artery disease)     MRSA infection        Assessment    Vitals/MEWS: MEWS Score: 1  Level of Consciousness: Alert (0)   Vitals:    23 1608 23 1945 23 1950 23 2133   BP: 118/77   138/89   Pulse: 90   82   Resp: 18   20   Temp: 98.5 °F (36.9 °C)   98.6 °F (37 °C)   TempSrc: Oral   Oral   SpO2: 95% 94% 96% 93%     FiO2 (%):   O2 Flow Rate: O2 Device: None (Room air)    Cardiac Rhythm:   Pain Assessment:  [] Verbal [] Margette Ligas Scale  Pain Scale:    Last documented pain score (0-10 scale)    Last documented pain medication administered:   Mental Status: oriented  NIH Score: NIH     C-SSRS: Risk of Suicide: No Risk  Bedside swallow:    Karen Coma Scale (GCS): Karen Coma Scale  Eye Opening: Spontaneous  Best Verbal Response: Oriented  Best Motor Response: Obeys commands  Karen Coma Scale Score: 15  Active LDA's:   Peripheral IV 03/16/23 Right Antecubital (Active)     PO Status: Regular  Pertinent or High Risk Medications/Drips: no   o If Yes, please provide details:   Pending Blood Product Administration: no     You may also review the ED PT Care Timeline found under the Summary Nursing Index tab. Recommendation    Pending orders   Plan for Discharge (if known):    Additional Comments:    If any further questions, please call Sending RN at ED    Electronically signed by: Electronically signed by Angela Maria RN on 3/16/2023 at 9:35 PM       Angela Maria RN  03/16/23 0551

## 2023-03-17 NOTE — H&P
History and Physical      Name:  Deana Harding /Age/Sex: 1970  (47 y.o. female)   MRN & CSN:  [id number] & [id number] Encounter Date/Time: 3/16/2023 10:41 PM EDT   Location:  ED09/ED-09 PCP: Ingrid Baumann MD       Hospital Day: 1    Assessment and Plan:     #. COPD exacerbation  -Patient's oxygen saturation 92% on room air.  -Continue IV steroids, bronchodilator therapy, azithromycin.  -Chest x-ray-no acute process,  -Influenza A&B, rapid COVID-negative.  -Respiratory viral panel negative. #.  Coronary artery disease  -[facility]-2019-stent to LAD  -Patient is on aspirin, atorvastatin, metoprolol. #.  Hypertension-on metoprolol. #.  COPD, not on home oxygen.  -Patient is on Trelegy, albuterol HFA as needed. -Pulmonologist- Dr Betty Muller    #. History of right hip replacement-2021    #. Severe obesity with BMI 39.97. #.  History of tobacco dependence-quit 2 months ago. Disposition:   Current Living situation: HOME    Diet Cardiac   DVT Prophylaxis [x] Lovenox, []  Heparin, [] SCDs, [] Ambulation,  [] Eliquis, [] Xarelto   Code Status full   Surrogate Decision Maker/ POA      History from:   EMR, patient. History of Present Illness:     Chief Complaint: COPD exacerbation ([facility] [address]  Deana Harding is a 47 y.o. female with coronary artery disease, history of PCI and stenting to LAD, hypertension, COPD, not on home oxygen, severe obesity presented to ED with complaints of shortness of breath. Patient reported having URI symptoms and then started having shortness of breath since Saturday. Patient reported progressively worsening shortness of breath, currently feels short of breath with minimal activity and at rest.  Patient denied any chest pain, fever, chills, has cough. Denied any other complaints-no nausea, vomiting, abdominal pain, denied any urinary complaints, denied any constipation or diarrhea. Vitals, labs within normal range.   Chest x-ray-no acute process, respiratory viral panel negative. Patient received DuoNeb, IV methylprednisolone, Tessalon Perles in ED. Review of Systems: Need 10 Elements   10 point review of systems conducted and pertinent positives and negatives as per HPI. Objective:   No intake or output data in the 24 hours ending 03/16/23 2241   Vitals:   Vitals:    03/16/23 1608 03/16/23 1945 03/16/23 1950 03/16/23 2133   BP: 118/77   138/89   Pulse: 90   82   Resp: 18   20   Temp: 98.5 °F (36.9 °C)   98.6 °F (37 °C)   TempSrc: Oral   Oral   SpO2: 95% 94% 96% 93%       Medications Prior to Admission   Reviewed medications with patient    Prior to Admission medications    Medication Sig Start Date End Date Taking? Authorizing Provider   fluticasone-umeclidin-vilant (TRELEGY ELLIPTA) 100-62.5-25 MCG/ACT AEPB inhaler Inhale 1 puff into the lungs daily 2/23/23   HUMZA Johnson CNP   albuterol sulfate HFA (PROVENTIL;VENTOLIN;PROAIR) 108 (90 Base) MCG/ACT inhaler Inhale 2 puffs into the lungs every 6 hours as needed for Wheezing or Shortness of Breath Rinse mouth after use 2/20/23   HUMZA Johnson CNP   aspirin EC 81 MG EC tablet Take 1 tablet by mouth daily 10/22/22   Candido Valdez MD   atorvastatin (LIPITOR) 40 MG tablet Take 1 tablet by mouth daily 10/22/22   Cnadido Valdez MD   metoprolol tartrate (LOPRESSOR) 50 MG tablet Take 0.5 tablets by mouth 2 times daily 10/22/22   Candido Valdez MD   nitroGLYCERIN (NITROSTAT) 0.3 MG SL tablet Place 1 tablet under the tongue every 5 minutes as needed for Chest pain up to max of 3 total doses.  If no relief after 1 dose, call 911. 10/22/22 2/20/23  Candido Valdez MD   fluticasone-umeclidin-vilant (TRELEGY ELLIPTA) 801-91.0-01 MCG/INH AEPB Inhale 1 puff into the lungs daily 10/6/20   HUMZA Reyes CNP   Spacer/Aero-Holding Lisa Horn 1 Device by Does not apply route daily as needed (use with MDI inhaler) 10/6/20   HUMZA Reyes - CNP       Physical Exam: Need 8 Elements   Physical Exam     GEN  -Awake, alert, NAD.   EYES   -PERRL. HENT  -MM are moist.   RESP  -decreased breath sounds, inspiratory and expiratory wheezing. C/V  -S1/S2 auscultated. RRR without appreciable M/R/G. No JVD or carotid bruits. Peripheral pulses equal bilaterally and palpable. No peripheral edema. No reproducible chest wall tenderness. GI  -Abdomen is soft, non-distended, no significant tenderness. No masses or guarding. + BS in all quadrants. Rectal exam deferred.   -No CVA tenderness. Rogers catheter is not present. MS  -B/L extremities - No gross joint deformities. No swelling, intact sensation symmetrical.   SKIN  -Normal coloration, warm, dry. NEURO  - Awake, alert, oriented x 3, no focal deficits. PSYC  - Appropriate affect. Past Medical History:   Reviewed patient's past medical, surgical, social, family history and allergies. PMHx   Past Medical History:   Diagnosis Date    CAD (coronary artery disease)     MRSA infection      PSHX:  has a past surgical history that includes Cholecystectomy (1992); skin biopsy; Coronary angioplasty with stent; back surgery; and joint replacement (Right). Allergies: No Known Allergies  Fam HX: family history includes Arthritis in her sister; Asthma in her sister; Coronary Art Dis in her father; Diabetes in her father; Heart Disease in her father; High Blood Pressure in her father; High Cholesterol in her father.   Soc HX:   Social History     Socioeconomic History    Marital status: Legally      Spouse name: Albino Schmid    Number of children: 1    Years of education: 12    Highest education level: None   Tobacco Use    Smoking status: Former     Types: Cigarettes    Smokeless tobacco: Never    Tobacco comments:     cut back 3 months ago to 1/2 pack from 2 packs a day   Vaping Use    Vaping Use: Never used   Substance and Sexual Activity    Alcohol use: Not Currently     Comment: occasional    Drug use: Yes     Frequency: 2.0 times per week     Types: Marijuana Geovanny Hailaine)     Comment: none since heart attack     Sexual activity: Not Currently       Medications:   Medications:    Infusions:   PRN Meds:     Labs      CBC:   Recent Labs     03/16/23 1940   WBC 10.6*   HGB 10.6*        BMP:    Recent Labs     03/16/23 1940      K 3.6      CO2 26   BUN 17   CREATININE 0.8   GLUCOSE 115*     Hepatic:   Recent Labs     03/16/23 1940   AST 24   ALT 19   BILITOT 0.2   ALKPHOS 98     Lipids:   Lab Results   Component Value Date/Time    CHOL 147 12/01/2021 09:59 AM    HDL 43 12/01/2021 09:59 AM    TRIG 136 12/01/2021 09:59 AM     Hemoglobin A1C:   Lab Results   Component Value Date/Time    LABA1C 5.6 02/13/2020 02:40 PM     TSH:   Lab Results   Component Value Date/Time    TSH 2.01 02/13/2020 02:40 PM     Troponin:   Lab Results   Component Value Date/Time    TROPONINT <0.010 10/21/2022 12:55 PM    TROPONINT <0.010 10/21/2022 05:03 AM    TROPONINT 0.010 10/21/2022 01:51 AM     Lactic Acid: No results for input(s): LACTA in the last 72 hours. BNP:   Recent Labs     03/16/23 1940   PROBNP 105.3     UA:No results found for: Cindi Leyden, PHUR, LABCAST, WBCUA, RBCUA, MUCUS, TRICHOMONAS, YEAST, BACTERIA, CLARITYU, SPECGRAV, LEUKOCYTESUR, UROBILINOGEN, BILIRUBINUR, BLOODU, GLUCOSEU, KETUA, AMORPHOUS  Urine Cultures: No results found for: LABURIN  Blood Cultures: No results found for: BC  No results found for: BLOODCULT2  Organism: No results found for: ORG    Imaging/Diagnostics Last 24 Hours   XR CHEST PORTABLE    Result Date: 3/16/2023  EXAMINATION: ONE XRAY VIEW OF THE CHEST 3/16/2023 5:30 pm COMPARISON: CT chest February 27, 2023; chest x-ray October 21, 2022 HISTORY: ORDERING SYSTEM PROVIDED HISTORY: SOB TECHNOLOGIST PROVIDED HISTORY: Reason for exam:->SOB Reason for Exam: SOB Additional signs and symptoms: none Relevant Medical/Surgical History: CAD FINDINGS: Cardiac silhouette is stable.   No focal consolidation, pleural effusion, or pneumothorax identified. Osseous structures appear intact. 1. No acute cardiopulmonary process is identified. Personally reviewed Lab Studies, Imaging, and discussed case with ED physician.     Electronically signed by Misti Prasad MD on 3/16/2023 at 10:41 PM

## 2023-03-17 NOTE — PROGRESS NOTES
LOVENOX PROPHYLAXIS EVALUATION    Wt Readings from Last 3 Encounters:   03/16/23 262 lb 14.4 oz (119.3 kg)   02/20/23 275 lb (124.7 kg)   10/26/22 285 lb 3.2 oz (129.4 kg)       Estimated Creatinine Clearance: 113 mL/min (based on SCr of 0.8 mg/dL).   Recent Labs     03/16/23  1940   BUN 17   CREATININE 0.8      HGB 10.6*   HCT 35.0*       Weight Range: 101-150.9 kg    CRCL = 30 or greater    50.9 kg   and below     .9  kg   101-150.9 kg   151-174.9  kg   175 kg  or greater     30mg subq  daily     40mg subq daily    (or 30mg subq BID orthopedic)     30mg subq  BID   40mg subq  BID   60mg subq BID       Per P/T protocol for appropriate subq anticoagulation by weight and CRCL change to:    Enoxaparin 30mg subq BID      JOSI Gilliam EDU [facility]  12:43 AM  03/17/23

## 2023-03-18 LAB
CULTURE: NORMAL
Lab: NORMAL
SPECIMEN: NORMAL
STREP A DIRECT SCREEN: NEGATIVE

## 2023-03-18 PROCEDURE — 6360000002 HC RX W HCPCS: Performed by: INTERNAL MEDICINE

## 2023-03-18 PROCEDURE — 6370000000 HC RX 637 (ALT 250 FOR IP): Performed by: INTERNAL MEDICINE

## 2023-03-18 PROCEDURE — 2700000000 HC OXYGEN THERAPY PER DAY

## 2023-03-18 PROCEDURE — 94761 N-INVAS EAR/PLS OXIMETRY MLT: CPT

## 2023-03-18 PROCEDURE — 94664 DEMO&/EVAL PT USE INHALER: CPT

## 2023-03-18 PROCEDURE — 2580000003 HC RX 258: Performed by: INTERNAL MEDICINE

## 2023-03-18 PROCEDURE — 94640 AIRWAY INHALATION TREATMENT: CPT

## 2023-03-18 PROCEDURE — 1200000000 HC SEMI PRIVATE

## 2023-03-18 RX ADMIN — IPRATROPIUM BROMIDE AND ALBUTEROL SULFATE 1 AMPULE: 2.5; .5 SOLUTION RESPIRATORY (INHALATION) at 23:16

## 2023-03-18 RX ADMIN — METHYLPREDNISOLONE SODIUM SUCCINATE 40 MG: 40 INJECTION, POWDER, FOR SOLUTION INTRAMUSCULAR; INTRAVENOUS at 01:37

## 2023-03-18 RX ADMIN — METOPROLOL TARTRATE 25 MG: 25 TABLET, FILM COATED ORAL at 08:34

## 2023-03-18 RX ADMIN — ENOXAPARIN SODIUM 30 MG: 100 INJECTION SUBCUTANEOUS at 08:33

## 2023-03-18 RX ADMIN — ASPIRIN 81 MG: 81 TABLET, COATED ORAL at 08:34

## 2023-03-18 RX ADMIN — IPRATROPIUM BROMIDE AND ALBUTEROL SULFATE 1 AMPULE: 2.5; .5 SOLUTION RESPIRATORY (INHALATION) at 16:22

## 2023-03-18 RX ADMIN — METOPROLOL TARTRATE 25 MG: 25 TABLET, FILM COATED ORAL at 21:18

## 2023-03-18 RX ADMIN — METHYLPREDNISOLONE SODIUM SUCCINATE 40 MG: 40 INJECTION, POWDER, FOR SOLUTION INTRAMUSCULAR; INTRAVENOUS at 13:58

## 2023-03-18 RX ADMIN — ACETAMINOPHEN 650 MG: 325 TABLET ORAL at 21:24

## 2023-03-18 RX ADMIN — METHYLPREDNISOLONE SODIUM SUCCINATE 40 MG: 40 INJECTION, POWDER, FOR SOLUTION INTRAMUSCULAR; INTRAVENOUS at 21:17

## 2023-03-18 RX ADMIN — AZITHROMYCIN MONOHYDRATE 500 MG: 500 INJECTION, POWDER, LYOPHILIZED, FOR SOLUTION INTRAVENOUS at 00:31

## 2023-03-18 RX ADMIN — AZITHROMYCIN MONOHYDRATE 500 MG: 500 INJECTION, POWDER, LYOPHILIZED, FOR SOLUTION INTRAVENOUS at 23:41

## 2023-03-18 RX ADMIN — ACETAMINOPHEN 650 MG: 325 TABLET ORAL at 00:25

## 2023-03-18 RX ADMIN — ATORVASTATIN CALCIUM 40 MG: 40 TABLET, FILM COATED ORAL at 08:34

## 2023-03-18 RX ADMIN — SODIUM CHLORIDE, PRESERVATIVE FREE 10 ML: 5 INJECTION INTRAVENOUS at 21:58

## 2023-03-18 RX ADMIN — ENOXAPARIN SODIUM 30 MG: 100 INJECTION SUBCUTANEOUS at 21:17

## 2023-03-18 RX ADMIN — METHYLPREDNISOLONE SODIUM SUCCINATE 40 MG: 40 INJECTION, POWDER, FOR SOLUTION INTRAMUSCULAR; INTRAVENOUS at 08:33

## 2023-03-18 RX ADMIN — BUDESONIDE AND FORMOTEROL FUMARATE DIHYDRATE 2 PUFF: 160; 4.5 AEROSOL RESPIRATORY (INHALATION) at 23:17

## 2023-03-18 ASSESSMENT — ENCOUNTER SYMPTOMS
DIARRHEA: 0
VOMITING: 0
EYE PAIN: 0
SINUS PRESSURE: 0
ABDOMINAL PAIN: 0
SHORTNESS OF BREATH: 1
BACK PAIN: 0
CHEST TIGHTNESS: 0
WHEEZING: 0
COUGH: 1
SINUS PAIN: 0
VOICE CHANGE: 0
EYE DISCHARGE: 0
NAUSEA: 0
BLOOD IN STOOL: 0

## 2023-03-18 ASSESSMENT — PAIN DESCRIPTION - LOCATION
LOCATION: BACK
LOCATION: HEAD

## 2023-03-18 ASSESSMENT — PAIN SCALES - GENERAL
PAINLEVEL_OUTOF10: 3
PAINLEVEL_OUTOF10: 4

## 2023-03-18 NOTE — PROGRESS NOTES
V2.0  [facility] Hospitalist Progress Note      Name:  Leigh Reddy /Age/Sex: 1970  (47 y.o. female)   MRN & CSN:  [id number] & [id number] Encounter Date/Time: 3/18/2023 3:37 PM EDT    Location:  [address] [place] [address] PCP: Navya Reyna MD       Hospital Day: 3    Assessment and Plan:   Leigh Reddy is a 47 y.o. female with pmh of COPD, HTN, CAD who presents with COPD exacerbation ([facility] [address]      Plan:  #. COPD exacerbation  -recent viral infection  -requriing 2l NC O2. Wean NC O2 to maintain sat>89%  -Continue IV steroids, bronchodilator therapy, azithromycin.  -Chest x-ray-no acute process,  -Influenza A&B, rapid COVID-negative.  -Respiratory viral panel negative. Still symptomatic     #. Coronary artery disease  -[facility]-2019-stent to LAD  -Patient is on aspirin, atorvastatin, metoprolol. #.  Hypertension-on metoprolol. #.  COPD, not on home oxygen.  -Patient is on Trelegy, albuterol HFA as needed. -Pulmonologist- Dr Yael Ladd     #. History of right hip replacement-2021     #. Severe obesity with BMI 39.97. #.  History of tobacco dependence-quit 2 months ago. Diet ADULT DIET; Regular; Low Sodium (2 gm)   DVT Prophylaxis [] Lovenox, []  Heparin, [] SCDs, [] Ambulation,  [] Eliquis, [] Xarelto  [] Coumadin   Code Status Full Code   Disposition From: Home  Expected Disposition: Home  Estimated Date of Discharge: 2-3 days   Patient requires continued admission due to COPD exacerbation   Surrogate Decision Maker/ POA      Subjective:     Chief Complaint: Shortness of Breath     Feels okay. Some improvement in SOB although not back to baseline. Hopes she can get off nc O2 prior to dc. Denies CP, nausea, vomiting, dizziness, lightheadedness. Review of Systems:    Review of Systems   Constitutional:  Negative for appetite change, chills, fever and unexpected weight change. HENT:  Negative for ear pain, hearing loss, sinus pressure, sinus pain and voice change.     Eyes:  Negative for pain, discharge and visual disturbance. Respiratory:  Positive for cough and shortness of breath. Negative for chest tightness and wheezing. Cardiovascular:  Negative for chest pain, palpitations and leg swelling. Gastrointestinal:  Negative for abdominal pain, blood in stool, diarrhea, nausea and vomiting. Genitourinary:  Negative for dysuria and frequency. Musculoskeletal:  Negative for arthralgias and back pain. Neurological:  Negative for dizziness, weakness, light-headedness and headaches. Psychiatric/Behavioral:  Negative for sleep disturbance. Objective: Intake/Output Summary (Last 24 hours) at 3/18/2023 1154  Last data filed at 3/17/2023 2300  Gross per 24 hour   Intake 150 ml   Output --   Net 150 ml          Vitals:   Vitals:    03/18/23 0830   BP: 122/67   Pulse: 81   Resp: 17   Temp: 98 °F (36.7 °C)   SpO2:        Physical Exam:   Physical Exam  Vitals reviewed. Constitutional:       Appearance: Normal appearance. She is normal weight. HENT:      Head: Normocephalic. Nose: Nose normal.      Mouth/Throat:      Mouth: Mucous membranes are moist.   Eyes:      Conjunctiva/sclera: Conjunctivae normal.      Pupils: Pupils are equal, round, and reactive to light. Cardiovascular:      Rate and Rhythm: Normal rate and regular rhythm. Pulses: Normal pulses. Heart sounds: Normal heart sounds. No murmur heard. Pulmonary:      Effort: No respiratory distress. Breath sounds: Wheezing (faint bilaterally.) present. No rhonchi or rales. Comments: Not in any acute respiratory distress. Speaking full sentences. Not using accessory muscles of respiration. Abdominal:      General: Abdomen is flat. Bowel sounds are normal. There is no distension. Palpations: Abdomen is soft. Tenderness: There is no abdominal tenderness. Musculoskeletal:         General: No deformity. Normal range of motion. Cervical back: Normal range of motion and neck supple.       Right lower leg: No edema. Left lower leg: No edema. Skin:     Coloration: Skin is not jaundiced or pale. Neurological:      General: No focal deficit present. Mental Status: She is alert and oriented to person, place, and time. Mental status is at baseline. Motor: No weakness. Psychiatric:         Mood and Affect: Mood normal.         Behavior: Behavior normal.          Medications:   Medications:    enoxaparin  30 mg SubCUTAneous BID    budesonide-formoterol  2 puff Inhalation BID    And    tiotropium  2 puff Inhalation Daily    sodium chloride flush  5-40 mL IntraVENous 2 times per day    ipratropium-albuterol  1 ampule Inhalation Q4H WA    methylPREDNISolone  40 mg IntraVENous Q6H    Followed by    Dani Mcgrath ON 3/19/2023] predniSONE  40 mg Oral Daily    azithromycin  500 mg IntraVENous Q24H    aspirin EC  81 mg Oral Daily    atorvastatin  40 mg Oral Daily    metoprolol tartrate  25 mg Oral BID      Infusions:    sodium chloride       PRN Meds: sodium chloride flush, 5-40 mL, PRN  sodium chloride, , PRN  ondansetron, 4 mg, Q8H PRN   Or  ondansetron, 4 mg, Q6H PRN  polyethylene glycol, 17 g, Daily PRN  acetaminophen, 650 mg, Q6H PRN   Or  acetaminophen, 650 mg, Q6H PRN  albuterol sulfate HFA, 2 puff, Q6H PRN      Labs      No results found for this or any previous visit (from the past 24 hour(s)). Imaging/Diagnostics Last 24 Hours   XR CHEST PORTABLE    Result Date: 3/16/2023  EXAMINATION: ONE XRAY VIEW OF THE CHEST 3/16/2023 5:30 pm COMPARISON: CT chest February 27, 2023; chest x-ray October 21, 2022 HISTORY: ORDERING SYSTEM PROVIDED HISTORY: SOB TECHNOLOGIST PROVIDED HISTORY: Reason for exam:->SOB Reason for Exam: SOB Additional signs and symptoms: none Relevant Medical/Surgical History: CAD FINDINGS: Cardiac silhouette is stable. No focal consolidation, pleural effusion, or pneumothorax identified. Osseous structures appear intact. 1. No acute cardiopulmonary process is identified. Electronically signed by Deep Corrales MD on 3/18/2023 at 11:54 AM

## 2023-03-19 VITALS
RESPIRATION RATE: 20 BRPM | HEIGHT: 68 IN | OXYGEN SATURATION: 95 % | HEART RATE: 95 BPM | WEIGHT: 262 LBS | BODY MASS INDEX: 39.71 KG/M2 | TEMPERATURE: 97.8 F | DIASTOLIC BLOOD PRESSURE: 98 MMHG | SYSTOLIC BLOOD PRESSURE: 155 MMHG

## 2023-03-19 PROCEDURE — 6360000002 HC RX W HCPCS: Performed by: INTERNAL MEDICINE

## 2023-03-19 PROCEDURE — 94761 N-INVAS EAR/PLS OXIMETRY MLT: CPT

## 2023-03-19 PROCEDURE — 6370000000 HC RX 637 (ALT 250 FOR IP): Performed by: INTERNAL MEDICINE

## 2023-03-19 PROCEDURE — 94640 AIRWAY INHALATION TREATMENT: CPT

## 2023-03-19 PROCEDURE — 94664 DEMO&/EVAL PT USE INHALER: CPT

## 2023-03-19 RX ORDER — PREDNISONE 20 MG/1
40 TABLET ORAL DAILY
Qty: 6 TABLET | Refills: 0 | Status: SHIPPED | OUTPATIENT
Start: 2023-03-20 | End: 2023-03-23

## 2023-03-19 RX ADMIN — PREDNISONE 40 MG: 20 TABLET ORAL at 08:30

## 2023-03-19 RX ADMIN — IPRATROPIUM BROMIDE AND ALBUTEROL SULFATE 1 AMPULE: 2.5; .5 SOLUTION RESPIRATORY (INHALATION) at 11:23

## 2023-03-19 RX ADMIN — ENOXAPARIN SODIUM 30 MG: 100 INJECTION SUBCUTANEOUS at 08:29

## 2023-03-19 RX ADMIN — ASPIRIN 81 MG: 81 TABLET, COATED ORAL at 08:30

## 2023-03-19 RX ADMIN — ATORVASTATIN CALCIUM 40 MG: 40 TABLET, FILM COATED ORAL at 09:32

## 2023-03-19 RX ADMIN — METOPROLOL TARTRATE 25 MG: 25 TABLET, FILM COATED ORAL at 08:30

## 2023-03-19 NOTE — DISCHARGE SUMMARY
V2.0  Discharge Summary    Name:  Cassie Lynn /Age/Sex: 1970 (47 y.o. female)   Admit Date: 3/16/2023  Discharge Date: 3/19/23    MRN & CSN:  [id number] & [id number] Encounter Date and Time 3/19/23 11:33 AM EDT    Attending:  Joan Monk MD Discharging Provider: Joan Monk MD       Hospital Course:     Brief HPI: Cassie Lynn is a 47 y.o. female with coronary artery disease, history of PCI and stenting to LAD, hypertension, COPD, not on home oxygen, severe obesity presented to ED with complaints of shortness of breath. Patient reported having URI symptoms and then started having shortness of breath since Saturday. Patient reported progressively worsening shortness of breath, currently feels short of breath with minimal activity and at rest.  Patient denied any chest pain, fever, chills, has cough. Denied any other complaints-no nausea, vomiting, abdominal pain, denied any urinary complaints, denied any constipation or diarrhea. Brief Problem Based Course:     #. COPD exacerbation  -recent viral infection. Chest x-ray-no acute process,Influenza A&B, rapid COVID-negative. Respiratory viral panel negative  -Initially requriing 2l NC O2. Improved with Continue IV steroids, bronchodilator therapy, azithromycin. Weaned off nasal cannula oxygen and maintained on room air for several hours prior to discharge. Patient's breathing almost at baseline prior to discharge. Patient discharged on remaining 3-day course of oral corticosteroids. #.  Coronary artery disease  -[facility]-2019-stent to LAD  -Patient is on aspirin, atorvastatin, metoprolol. #.  Hypertension-on metoprolol. #.  COPD, not on home oxygen.  -Patient is on Trelegy, albuterol HFA as needed. -Pulmonologist- Dr Augstin Harrington, plan for outpatient follow-up after discharge. #.  History of right hip replacement-2021     #. Severe obesity with BMI 39.97. #.  History of tobacco dependence-quit 2 months ago.       Patient denies any CP,Denies any nausea, vomiting, abdominal pain. Denies any diarrhea. Patient denies any palpitations, lightheadedness, orthopnea, PND. Patient doesn't appear to be in any distress on discharge . The patient expressed appropriate understanding of, and agreement with the discharge recommendations, medications, and plan. Consults this admission:  None    Discharge Diagnosis:   COPD exacerbation ([address]  Artery disease  Hypertension  History of right hip replacement  Severe obesity  Tobacco dependence    Discharge Instruction:   Follow up appointments: PCP, pulmonology  Primary care physician: Man Hartman MD within 2 weeks  Diet: regular diet   Activity: activity as tolerated  Disposition: Discharged to:   [x]Home, [][facility], []SNF, []Acute Rehab, []Hospice   Condition on discharge: Stable  Labs and Tests to be Followed up as an outpatient by PCP or Specialist: Ensure completion of steroids.     Discharge Medications:        Medication List        START taking these medications      albuterol sulfate  (90 Base) MCG/ACT inhaler  Commonly known as: PROVENTIL;VENTOLIN;PROAIR  Inhale 2 puffs into the lungs every 6 hours as needed for Wheezing or Shortness of Breath Rinse mouth after use     predniSONE 20 MG tablet  Commonly known as: DELTASONE  Take 2 tablets by mouth daily for 3 days  Start taking on: March 20, 2023            CONTINUE taking these medications      aspirin EC 81 MG EC tablet  Take 1 tablet by mouth daily     atorvastatin 40 MG tablet  Commonly known as: Lipitor  Take 1 tablet by mouth daily     * fluticasone-umeclidin-vilant 100-62.5-25 MCG/INH Aepb  Commonly known as: TRELEGY ELLIPTA  Inhale 1 puff into the lungs daily     * Trelegy Ellipta 100-62.5-25 MCG/ACT Aepb inhaler  Generic drug: fluticasone-umeclidin-vilant  Inhale 1 puff into the lungs daily     metoprolol tartrate 50 MG tablet  Commonly known as: Lopressor  Take 0.5 tablets by mouth 2 times daily     nitroGLYCERIN 0.3 MG SL tablet  Commonly known as: Nitrostat  Place 1 tablet under the tongue every 5 minutes as needed for Chest pain up to max of 3 total doses. If no relief after 1 dose, call 911. Spacer/Aero-Holding Thomasradha Norris  1 Device by Does not apply route daily as needed (use with MDI inhaler)           * This list has 2 medication(s) that are the same as other medications prescribed for you. Read the directions carefully, and ask your doctor or other care provider to review them with you. Where to Get Your Medications        These medications were sent to [facility], [address] [place] [address] [phone] [address] [place] [facility] [address]      Phone: [phone]   predniSONE 20 MG tablet        Objective Findings at Discharge:   BP (!) 155/98   Pulse 95   Temp 97.8 °F (36.6 °C) (Oral)   Resp 20   Ht 5' 8\" (1.727 m)   Wt 262 lb (118.8 kg)   LMP 06/13/2018   SpO2 95%   BMI 39.84 kg/m²       Physical Exam:   General: NAD  Eyes: EOMI  ENT: neck supple  Cardiovascular: Regular rate. Respiratory: Clear to auscultation. No wheezing noted on exam.  Moving air well. Not in acute respiratory distress. Speaking full sentences. Gastrointestinal: Soft, non tender  Genitourinary: no suprapubic tenderness  Musculoskeletal: No edema  Skin: warm, dry  Neuro: Alert. Psych: Mood appropriate. Labs and Imaging   XR CHEST PORTABLE    Result Date: 3/16/2023  EXAMINATION: ONE XRAY VIEW OF THE CHEST 3/16/2023 5:30 pm COMPARISON: CT chest February 27, 2023; chest x-ray October 21, 2022 HISTORY: ORDERING SYSTEM PROVIDED HISTORY: SOB TECHNOLOGIST PROVIDED HISTORY: Reason for exam:->SOB Reason for Exam: SOB Additional signs and symptoms: none Relevant Medical/Surgical History: CAD FINDINGS: Cardiac silhouette is stable. No focal consolidation, pleural effusion, or pneumothorax identified. Osseous structures appear intact.      1. No acute cardiopulmonary process is identified.        CBC:   Recent Labs     03/16/23 1940   WBC 10.6*   HGB 10.6*        BMP:    Recent Labs     03/16/23 1940      K 3.6      CO2 26   BUN 17   CREATININE 0.8   GLUCOSE 115*     Hepatic:   Recent Labs     03/16/23 1940   AST 24   ALT 19   BILITOT 0.2   ALKPHOS 98     Lipids:   Lab Results   Component Value Date/Time    CHOL 147 12/01/2021 09:59 AM    HDL 43 12/01/2021 09:59 AM    TRIG 136 12/01/2021 09:59 AM     Hemoglobin A1C:   Lab Results   Component Value Date/Time    LABA1C 5.6 02/13/2020 02:40 PM     TSH:   Lab Results   Component Value Date/Time    TSH 2.01 02/13/2020 02:40 PM     Troponin:   Lab Results   Component Value Date/Time    TROPONINT <0.010 10/21/2022 12:55 PM    TROPONINT <0.010 10/21/2022 05:03 AM    TROPONINT 0.010 10/21/2022 01:51 AM       BNP:   Recent Labs     03/16/23 1940   PROBNP 105.3         Time Spent Discharging patient 35 minutes    Electronically signed by Kena Cleveland MD on 3/19/2023 at 11:33 AM

## 2023-03-27 ENCOUNTER — TELEPHONE (OUTPATIENT)
Dept: PULMONOLOGY | Age: 53
End: 2023-03-27

## 2023-04-03 ENCOUNTER — OFFICE VISIT (OUTPATIENT)
Dept: PULMONOLOGY | Age: 53
End: 2023-04-03
Payer: COMMERCIAL

## 2023-04-03 DIAGNOSIS — J44.9 COPD, SEVERE (HCC): ICD-10-CM

## 2023-04-03 DIAGNOSIS — R06.09 DYSPNEA ON EXERTION: Primary | ICD-10-CM

## 2023-04-03 LAB
EXPIRATORY TIME-POST: NORMAL
EXPIRATORY TIME: NORMAL
FEF 25-75% %CHNG: NORMAL
FEF 25-75% %PRED-POST: NORMAL
FEF 25-75% %PRED-PRE: NORMAL
FEF 25-75% PRED: NORMAL
FEF 25-75%-POST: NORMAL
FEF 25-75%-PRE: NORMAL
FEV1 %PRED-POST: 62.1 %
FEV1 %PRED-PRE: 54.6 %
FEV1 PRED: 3.25 L
FEV1-POST: 2.02 L
FEV1-PRE: 1.78 L
FEV1/FVC %PRED-POST: 80.1 %
FEV1/FVC %PRED-PRE: 80.2 %
FEV1/FVC PRED: 79.7 %
FEV1/FVC-POST: 63.8 %
FEV1/FVC-PRE: 63.9 %
FVC %PRED-POST: 76.6 L
FVC %PRED-PRE: 67.2 %
FVC PRED: 4.13 L
FVC-POST: 3.17 L
FVC-PRE: 2.78 L
PEF %PRED-POST: NORMAL
PEF %PRED-PRE: NORMAL
PEF PRED: NORMAL
PEF%CHNG: NORMAL
PEF-POST: NORMAL
PEF-PRE: NORMAL

## 2023-04-03 PROCEDURE — G8427 DOCREV CUR MEDS BY ELIG CLIN: HCPCS | Performed by: NURSE PRACTITIONER

## 2023-04-03 PROCEDURE — 1111F DSCHRG MED/CURRENT MED MERGE: CPT | Performed by: NURSE PRACTITIONER

## 2023-04-03 PROCEDURE — G8417 CALC BMI ABV UP PARAM F/U: HCPCS | Performed by: NURSE PRACTITIONER

## 2023-04-03 PROCEDURE — 3017F COLORECTAL CA SCREEN DOC REV: CPT | Performed by: NURSE PRACTITIONER

## 2023-04-03 PROCEDURE — 99213 OFFICE O/P EST LOW 20 MIN: CPT | Performed by: NURSE PRACTITIONER

## 2023-04-03 PROCEDURE — 3023F SPIROM DOC REV: CPT | Performed by: NURSE PRACTITIONER

## 2023-04-03 PROCEDURE — 1036F TOBACCO NON-USER: CPT | Performed by: NURSE PRACTITIONER

## 2023-04-03 ASSESSMENT — ENCOUNTER SYMPTOMS
ALLERGIC/IMMUNOLOGIC NEGATIVE: 1
SHORTNESS OF BREATH: 1
EYES NEGATIVE: 1

## 2023-04-03 ASSESSMENT — PULMONARY FUNCTION TESTS
FEV1_PERCENT_PREDICTED_POST: 62.1
FEV1_PREDICTED: 3.25
FVC_PRE: 2.78
FVC_POST: 3.17
FEV1_PERCENT_PREDICTED_PRE: 54.6
FVC_PREDICTED: 4.13
FEV1/FVC_PRE: 63.9
FEV1/FVC_POST: 63.8
FEV1/FVC_PERCENT_PREDICTED_PRE: 80.2
FEV1_PRE: 1.78
FEV1/FVC_PERCENT_PREDICTED_POST: 80.1
FEV1/FVC_PREDICTED: 79.7
FVC_PERCENT_PREDICTED_POST: 76.6
FVC_PERCENT_PREDICTED_PRE: 67.2
FEV1_POST: 2.02

## 2023-04-03 ASSESSMENT — COPD QUESTIONNAIRES: COPD: 1

## 2023-04-03 NOTE — PROGRESS NOTES
movements intact. Conjunctiva/sclera: Conjunctivae normal.      Pupils: Pupils are equal, round, and reactive to light. Cardiovascular:      Rate and Rhythm: Normal rate and regular rhythm. Pulses: Normal pulses. Heart sounds: Normal heart sounds. Pulmonary:      Effort: Pulmonary effort is normal.      Breath sounds: Normal breath sounds. Abdominal:      Palpations: Abdomen is soft. Musculoskeletal:         General: Normal range of motion. Cervical back: Normal range of motion and neck supple. Skin:     General: Skin is warm and dry. Capillary Refill: Capillary refill takes less than 2 seconds. Neurological:      General: No focal deficit present. Mental Status: She is alert and oriented to person, place, and time. Mental status is at baseline. Psychiatric:         Mood and Affect: Mood normal.         Behavior: Behavior normal.         Thought Content:  Thought content normal.         Judgment: Judgment normal.        Active Ambulatory Problems     Diagnosis Date Noted    STEMI involving right coronary artery (HCC)     STEMI (ST elevation myocardial infarction) ([facility] [address] 06/13/2019    Chest pain 06/28/2020    Coronary artery disease involving native coronary artery of native heart with angina pectoris ([facility] [address] 07/17/2020    Mixed hyperlipidemia 07/17/2020    Spinal stenosis of lumbar region 08/11/2020    Hip pain, acute, right 08/11/2020    Primary osteoarthritis of right hip 11/12/2020    Primary hypertension 10/26/2022    COPD, severe ([facility] [address] 02/20/2023    Dyspnea on exertion 02/20/2023    COPD exacerbation ([facility] [address] 03/16/2023     Resolved Ambulatory Problems     Diagnosis Date Noted    No Resolved Ambulatory Problems     Past Medical History:   Diagnosis Date    CAD (coronary artery disease)     MRSA infection      Current Outpatient Medications   Medication Sig Dispense Refill    fluticasone-umeclidin-vilant (TRELEGY ELLIPTA) 100-62.5-25 MCG/ACT AEPB inhaler Inhale 1 puff into

## 2023-04-06 RX ORDER — ATORVASTATIN CALCIUM 40 MG/1
40 TABLET, FILM COATED ORAL DAILY
Qty: 30 TABLET | Refills: 5 | Status: SHIPPED | OUTPATIENT
Start: 2023-04-06

## 2023-05-25 RX ORDER — ATORVASTATIN CALCIUM 40 MG/1
40 TABLET, FILM COATED ORAL DAILY
Qty: 30 TABLET | Refills: 5 | Status: SHIPPED | OUTPATIENT
Start: 2023-05-25

## 2023-07-17 ENCOUNTER — OFFICE VISIT (OUTPATIENT)
Dept: CARDIOLOGY CLINIC | Age: 53
End: 2023-07-17
Payer: COMMERCIAL

## 2023-07-17 VITALS
DIASTOLIC BLOOD PRESSURE: 68 MMHG | HEART RATE: 81 BPM | WEIGHT: 282 LBS | SYSTOLIC BLOOD PRESSURE: 118 MMHG | OXYGEN SATURATION: 96 % | HEIGHT: 69 IN | BODY MASS INDEX: 41.77 KG/M2

## 2023-07-17 DIAGNOSIS — R06.02 SOB (SHORTNESS OF BREATH): ICD-10-CM

## 2023-07-17 DIAGNOSIS — I25.119 CORONARY ARTERY DISEASE INVOLVING NATIVE CORONARY ARTERY OF NATIVE HEART WITH ANGINA PECTORIS (HCC): ICD-10-CM

## 2023-07-17 DIAGNOSIS — I10 PRIMARY HYPERTENSION: ICD-10-CM

## 2023-07-17 DIAGNOSIS — I83.893 VARICOSE VEINS OF BOTH LEGS WITH EDEMA: ICD-10-CM

## 2023-07-17 DIAGNOSIS — E78.2 MIXED HYPERLIPIDEMIA: ICD-10-CM

## 2023-07-17 PROCEDURE — 3017F COLORECTAL CA SCREEN DOC REV: CPT | Performed by: NURSE PRACTITIONER

## 2023-07-17 PROCEDURE — G8427 DOCREV CUR MEDS BY ELIG CLIN: HCPCS | Performed by: NURSE PRACTITIONER

## 2023-07-17 PROCEDURE — 3078F DIAST BP <80 MM HG: CPT | Performed by: NURSE PRACTITIONER

## 2023-07-17 PROCEDURE — 1036F TOBACCO NON-USER: CPT | Performed by: NURSE PRACTITIONER

## 2023-07-17 PROCEDURE — 3074F SYST BP LT 130 MM HG: CPT | Performed by: NURSE PRACTITIONER

## 2023-07-17 PROCEDURE — G8417 CALC BMI ABV UP PARAM F/U: HCPCS | Performed by: NURSE PRACTITIONER

## 2023-07-17 PROCEDURE — 99214 OFFICE O/P EST MOD 30 MIN: CPT | Performed by: NURSE PRACTITIONER

## 2023-07-17 RX ORDER — ATORVASTATIN CALCIUM 40 MG/1
40 TABLET, FILM COATED ORAL DAILY
Qty: 30 TABLET | Refills: 5 | Status: SHIPPED | OUTPATIENT
Start: 2023-07-17

## 2023-07-17 RX ORDER — METOPROLOL TARTRATE 50 MG/1
25 TABLET, FILM COATED ORAL 2 TIMES DAILY
Qty: 60 TABLET | Refills: 3 | Status: SHIPPED | OUTPATIENT
Start: 2023-07-17

## 2023-07-17 ASSESSMENT — ENCOUNTER SYMPTOMS
ORTHOPNEA: 0
SHORTNESS OF BREATH: 1

## 2023-07-17 NOTE — PATIENT INSTRUCTIONS
Please be informed that if you contact our office outside of normal business hours the physician on call cannot help with any scheduling or rescheduling issues, procedure instruction questions or any type of medication issue. We advise you for any urgent/emergency that you go to the nearest emergency room! PLEASE CALL OUR OFFICE DURING NORMAL BUSINESS HOURS    Monday - Friday   8 am to 5 pm    [place]: [address] [place]: [phone]    [place]:  [facility] Locations - No appointment necessary. Sites open Monday to Friday. Call your preferred location for test preparation, business   hours and other information you need. [employer]. [address] [place] [address]. [address], [address] [facility]  Phone: [phone]     **It is YOUR responsibilty to bring medication bottles and/or updated medication list to [address] [place] [address]. This will allow us to better serve you and all your healthcare needs**  Thank you for allowing us to care for you today! We want to ensure we can follow your treatment plan and we strive to give you the best outcomes and experience possible. If you ever have a life threatening emergency and call 911 - for an ambulance (EMS)   Our providers can only care for you at:   [facility] or [facility]. Even if you have someone take you or you drive yourself we can only care for you in a [facility] facility. Our providers are not setup at the other healthcare locations!

## 2023-07-18 ENCOUNTER — HOSPITAL ENCOUNTER (OUTPATIENT)
Age: 53
Discharge: HOME | End: 2023-07-18
Payer: COMMERCIAL

## 2023-07-18 DIAGNOSIS — E78.2 MIXED HYPERLIPIDEMIA: ICD-10-CM

## 2023-07-18 DIAGNOSIS — R06.02 SOB (SHORTNESS OF BREATH): ICD-10-CM

## 2023-07-18 LAB
CHOLEST SERPL-MCNC: 140 MG/DL
HDLC SERPL-MCNC: 46 MG/DL
LDLC SERPL CALC-MCNC: 76 MG/DL
PRO-BNP: 90.96 PG/ML
TRIGL SERPL-MCNC: 92 MG/DL

## 2023-07-18 PROCEDURE — 80061 LIPID PANEL: CPT

## 2023-07-18 PROCEDURE — 36415 COLL VENOUS BLD VENIPUNCTURE: CPT

## 2023-07-18 PROCEDURE — 83880 ASSAY OF NATRIURETIC PEPTIDE: CPT

## 2023-07-20 ENCOUNTER — TELEPHONE (OUTPATIENT)
Dept: CARDIOLOGY CLINIC | Age: 53
End: 2023-07-20

## 2023-07-20 NOTE — TELEPHONE ENCOUNTER
----- Message from HUMZA Kumar CNP sent at 7/19/2023  2:44 PM EDT -----  Please phone results - Left message for pt on [employer]

## 2023-08-14 DIAGNOSIS — I10 PRIMARY HYPERTENSION: ICD-10-CM

## 2023-08-14 RX ORDER — ATORVASTATIN CALCIUM 40 MG/1
40 TABLET, FILM COATED ORAL DAILY
Qty: 30 TABLET | Refills: 5 | Status: CANCELLED | OUTPATIENT
Start: 2023-08-14

## 2023-08-14 NOTE — TELEPHONE ENCOUNTER
Patient called she is in the process of moving and has lost her Atorvastatin , asking for a new refill

## 2023-08-17 ENCOUNTER — PROCEDURE VISIT (OUTPATIENT)
Dept: CARDIOLOGY CLINIC | Age: 53
End: 2023-08-17

## 2023-08-17 DIAGNOSIS — I83.893 VARICOSE VEINS OF BOTH LEGS WITH EDEMA: ICD-10-CM

## 2023-08-21 ENCOUNTER — TELEPHONE (OUTPATIENT)
Dept: CARDIOLOGY CLINIC | Age: 53
End: 2023-08-21

## 2023-08-21 NOTE — TELEPHONE ENCOUNTER
----- Message from HUMZA Caballero - CNP sent at 8/21/2023 10:32 AM EDT -----  Please inform of results -no DVT- has significant reflux-recommend use of compression socks and offer apt with DR Cherie Leal to pt, with wear compression socks and scheduled with Dr Korin Aguilar

## 2023-08-29 ENCOUNTER — OFFICE VISIT (OUTPATIENT)
Dept: CARDIOLOGY CLINIC | Age: 53
End: 2023-08-29
Payer: COMMERCIAL

## 2023-08-29 VITALS
HEIGHT: 69 IN | WEIGHT: 279 LBS | BODY MASS INDEX: 41.32 KG/M2 | HEART RATE: 75 BPM | SYSTOLIC BLOOD PRESSURE: 120 MMHG | DIASTOLIC BLOOD PRESSURE: 72 MMHG

## 2023-08-29 DIAGNOSIS — I25.119 CORONARY ARTERY DISEASE INVOLVING NATIVE CORONARY ARTERY OF NATIVE HEART WITH ANGINA PECTORIS (HCC): ICD-10-CM

## 2023-08-29 DIAGNOSIS — I10 PRIMARY HYPERTENSION: ICD-10-CM

## 2023-08-29 DIAGNOSIS — E78.2 MIXED HYPERLIPIDEMIA: ICD-10-CM

## 2023-08-29 DIAGNOSIS — I21.11 STEMI INVOLVING RIGHT CORONARY ARTERY (HCC): ICD-10-CM

## 2023-08-29 DIAGNOSIS — I83.893 VARICOSE VEINS OF BOTH LEGS WITH EDEMA: Primary | ICD-10-CM

## 2023-08-29 PROCEDURE — 3017F COLORECTAL CA SCREEN DOC REV: CPT | Performed by: INTERNAL MEDICINE

## 2023-08-29 PROCEDURE — G8417 CALC BMI ABV UP PARAM F/U: HCPCS | Performed by: INTERNAL MEDICINE

## 2023-08-29 PROCEDURE — 99204 OFFICE O/P NEW MOD 45 MIN: CPT | Performed by: INTERNAL MEDICINE

## 2023-08-29 PROCEDURE — G8427 DOCREV CUR MEDS BY ELIG CLIN: HCPCS | Performed by: INTERNAL MEDICINE

## 2023-08-29 PROCEDURE — 3078F DIAST BP <80 MM HG: CPT | Performed by: INTERNAL MEDICINE

## 2023-08-29 PROCEDURE — 3074F SYST BP LT 130 MM HG: CPT | Performed by: INTERNAL MEDICINE

## 2023-08-29 PROCEDURE — 1036F TOBACCO NON-USER: CPT | Performed by: INTERNAL MEDICINE

## 2023-08-29 NOTE — PATIENT INSTRUCTIONS
CVI: Patient has symptomatic C4 venous disease. US is abnormal as follows:  8/21/2023   No evidence of DVT or SVT in the bilateral common femoral vein, femoral   vein, popliteal vein, greater saphenous vein or small saphenous vein. Significant reflux noted of the Right GSV Proximal Calf (2.3s) and GSV   Distal Calf (1.5s). Significant reflux noted in the Left CFV (1.2s), and GSV Mid Calf Tributary   (1.1s). Recommend Ablations of both GSVs.    I spent about 30 min. of time in review of the available data, chart Prep., interviewing patient, obtaining history, performing physical exam, going through decision making analysis for assessment & plans of management on this patient. Office Visit a month after the procedures.

## 2023-09-08 ENCOUNTER — TELEPHONE (OUTPATIENT)
Dept: CARDIOLOGY CLINIC | Age: 53
End: 2023-09-08

## 2023-09-08 NOTE — TELEPHONE ENCOUNTER
"  History     Chief Complaint:  Alcohol Intoxication    HPI   Andrea Collado is a 55 year old male with a history of alcohol and substance abuse, as well as withdrawal seizures who presents to the emergency department via the police department for evaluation of alcohol intoxication. Today, he reports drinking Everclear all day long and now states he \"hurts all over.\" He called the police from his home to present to the ED for evaluation. Here, he reports that he moved to the area to care for his dad 14 months ago, however his dad has recently . Since then, he reports he has been drinking \"too much\" and now has pain all over his body. He wants to get help and \"find peace,\" however due to bad experiences in the past with NA and AA, he is very resistant to the idea of hospitalization or treatment programs. He denies any suicidal ideations, nor has he taken any other drugs today. He states his last drink was sometime today. He reports a history of withdrawal seizures and that they occur every time he tries to quit. He denies any other medical problems. Of note, he is adamant in not wanting his family to be notified.     Allergies:  NKDA    Medications:    Bupropion  Depotestosterone injection    Past Medical History:    Chemical dependency  Cocaine abuse  Depression  DVT  Flail chest  HTN  Seizures  Psychosis  Bipolar affective disorder    Past Surgical History:    Chest surgery  Hip surgery  Knee surgery    Family History:    Substance abuse    Social History:  Marital Status:  Single [1]  Presents via PD  Current tobacco user. Cigarettes and smokeless.   Positive for daily alcohol use.   Negative for drug use. Former cocaine user.      Review of Systems   Gastrointestinal: Negative for vomiting.   Musculoskeletal:        Hurts all over   Psychiatric/Behavioral: Positive for agitation. Negative for suicidal ideas.   All other systems reviewed and are negative.        Physical Exam     Patient Vitals for the past 24 " Patient called said [facility] said they can not reach us and they need more info to approve. They told pt well it is just varicose veins she told them no it is not. My feet and legs swell and hurt all the time. Please call patient or insurance to find out what info they need. hrs:   BP Temp Temp src Heart Rate Resp SpO2 Height Weight   01/30/19 1341 118/79 97.5  F (36.4  C) Oral 95 16 94 % 1.829 m (6') 90.7 kg (200 lb)     Physical Exam  General: Agitated, Disheveled, Smells of alcohol  Head:  Scalp is atraumatic  Eyes:  The pupils are equal, round, and reactive to light    EOM's intact    No scleral icterus  ENT:      Nose:  The external nose is normal  Ears:  External ears are normal  Mouth/Throat: The oropharynx is normal    Mucus membranes are dry      Neck:  Normal range of motion.      There is no rigidity.    Trachea is in the midline         CV:  Regular rate and rhythm    No murmur   Resp:  Breath sounds are clear bilaterally    Non-labored, no retractions or accessory muscle use      GI:  Abdomen is soft, no distension, no tenderness.       MS:  Normal strength in all 4 extremities  Skin:  Warm and dry, No rash or lesions noted.  Neuro: Strength 5/5 x4.  Sensation intact  In all 4 extremities.        Psych:  Awake. Alert.  Appears intoxicated but conversive, denies suicidal ideation.       Emergency Department Course   Laboratory:  CBC: WBC: 5.1, HGB: 15.0, PLT: 183  CMP: Na: 147 (H), Cl: 110 (H), ALT: 117 (H), AST: 72 (H), o/w WNL (Creatinine: 0.80)    Drug abuse screen (urine): Benzodiazepine: Positive (A), o/w Negative  Alcohol ethyl: 0.38 (HH)    Interventions:  1423 Ativan, 1 mg, IV injection  1500 Toradol, 15 mg, IV injection  1501 Infuvite adult + NS, 1L, IV    Emergency Department Course:  Patient presents via PD. Nursing notes and vitals reviewed.   (1350) I performed an exam of the patient as documented above.      IV inserted. Medicine administered as documented above. Blood drawn. This was sent to the lab for further testing, results above.    The patient provided a urine sample here in the emergency department. This was sent for laboratory testing, findings above.      (1500) Care signed out to Dr. Hurtado pending reassessment when clinically  sober.       Impression & Plan      Medical Decision Making:  Andrea Collado is a 55 year old male who was seen and evaluated. The above work up was undertaken demonstrating a quite elevated ethanol level, as well as a mild transaminates. He appears acutely intoxicated and states he wants help with his drinking, but does not want to go detox, nor does he want to go to Narcotics Anonymous or Alcoholics Anonymous. He repeatedly denies denies suicidal ideations and demonstrates no signs of acute alcohol withdrawal at this time. At this point he is too acutely intoxicated to go home, as the temperature is -20 degrees. He received IV fluids here as well as lorazepam for anxiety and myalgias. Care was turned over to Dr. Hurtado pending improved clinical sobriety and a repeat assessment.     Critical Care time:  none    Diagnosis:    ICD-10-CM    1. Acute alcoholic intoxication in alcoholism without complication (H) F10.220        Disposition:  Care signed out to Dr. Hurtado pending repeat assessment once clinically sober    Scribe Disclosure:  I, Sarah Pratt, am serving as a scribe on 1/30/2019 at 1:50 PM to personally document services performed by Len Hart MD based on my observations and the provider's statements to me.     Sarah Pratt  1/30/2019    EMERGENCY DEPARTMENT       Len Hart MD  01/30/19 0161

## 2023-09-08 NOTE — TELEPHONE ENCOUNTER
[facility] called with a denial for Vaenous Ablation can call for Per to Peer [phone]

## 2023-09-12 SDOH — HEALTH STABILITY: PHYSICAL HEALTH: ON AVERAGE, HOW MANY DAYS PER WEEK DO YOU ENGAGE IN MODERATE TO STRENUOUS EXERCISE (LIKE A BRISK WALK)?: 2 DAYS

## 2023-09-12 SDOH — HEALTH STABILITY: PHYSICAL HEALTH: ON AVERAGE, HOW MANY MINUTES DO YOU ENGAGE IN EXERCISE AT THIS LEVEL?: 10 MIN

## 2023-09-14 ENCOUNTER — TELEPHONE (OUTPATIENT)
Dept: FAMILY MEDICINE CLINIC | Age: 53
End: 2023-09-14

## 2023-09-14 NOTE — TELEPHONE ENCOUNTER
----- Message from Katie Law sent at 9/14/2023  3:11 PM EDT -----  Subject: Appointment Request    Reason for Call: New Patient/New to Provider Appointment needed: New   Patient Request Appointment    QUESTIONS    Reason for appointment request? No appointments available during search     Additional Information for Provider? Gabbie Moody had to cancel her new   patient appt on 9- with Austyn Juarez for a family emergency.  Please   call her back to re-schedule.  ---------------------------------------------------------------------------  --------------  Melba LLANOS  [phone]; OK to leave message on voicemail  ---------------------------------------------------------------------------  --------------  SCRIPT ANSWERS

## 2023-09-25 ENCOUNTER — TELEPHONE (OUTPATIENT)
Dept: CARDIOLOGY CLINIC | Age: 53
End: 2023-09-25

## 2023-09-25 NOTE — TELEPHONE ENCOUNTER
Per Dr. Katy Erwin he had a peer to peer regarding the aforementioned patient. Her denial letter stated that they want to see the reflux and measurement at the junction AND proximal GSV. Darryl Forrester, if we could revisit and re-notate on the venous report to reflect requirements please. Tammi Lovell, once this is complete, can we appeal, including the number included on the denial letter.

## 2023-09-26 ENCOUNTER — TRANSCRIBE ORDERS (OUTPATIENT)
Dept: CARDIOLOGY CLINIC | Age: 53
End: 2023-09-26

## 2023-09-26 DIAGNOSIS — I10 PRIMARY HYPERTENSION: ICD-10-CM

## 2023-09-26 RX ORDER — METOPROLOL TARTRATE 50 MG/1
25 TABLET, FILM COATED ORAL 2 TIMES DAILY
Qty: 60 TABLET | Refills: 3
Start: 2023-09-26 | End: 2023-09-29 | Stop reason: SDUPTHER

## 2023-09-29 DIAGNOSIS — I10 PRIMARY HYPERTENSION: ICD-10-CM

## 2023-09-29 RX ORDER — METOPROLOL TARTRATE 50 MG/1
25 TABLET, FILM COATED ORAL 2 TIMES DAILY
Qty: 90 TABLET | Refills: 3 | Status: SHIPPED | OUTPATIENT
Start: 2023-09-29

## 2023-10-14 SDOH — HEALTH STABILITY: PHYSICAL HEALTH: ON AVERAGE, HOW MANY DAYS PER WEEK DO YOU ENGAGE IN MODERATE TO STRENUOUS EXERCISE (LIKE A BRISK WALK)?: 2 DAYS

## 2023-10-14 SDOH — HEALTH STABILITY: PHYSICAL HEALTH: ON AVERAGE, HOW MANY MINUTES DO YOU ENGAGE IN EXERCISE AT THIS LEVEL?: 10 MIN

## 2023-10-16 ENCOUNTER — OFFICE VISIT (OUTPATIENT)
Dept: FAMILY MEDICINE CLINIC | Age: 53
End: 2023-10-16

## 2023-10-16 VITALS
WEIGHT: 278 LBS | BODY MASS INDEX: 41.18 KG/M2 | HEIGHT: 69 IN | SYSTOLIC BLOOD PRESSURE: 122 MMHG | OXYGEN SATURATION: 97 % | DIASTOLIC BLOOD PRESSURE: 78 MMHG | HEART RATE: 79 BPM

## 2023-10-16 DIAGNOSIS — J44.9 COPD, SEVERE (HCC): ICD-10-CM

## 2023-10-16 DIAGNOSIS — Z23 NEED FOR PNEUMOCOCCAL VACCINE: ICD-10-CM

## 2023-10-16 DIAGNOSIS — Z23 NEEDS FLU SHOT: ICD-10-CM

## 2023-10-16 DIAGNOSIS — I10 PRIMARY HYPERTENSION: Primary | ICD-10-CM

## 2023-10-16 DIAGNOSIS — M62.830 BACK MUSCLE SPASM: ICD-10-CM

## 2023-10-16 DIAGNOSIS — M48.061 SPINAL STENOSIS OF LUMBAR REGION, UNSPECIFIED WHETHER NEUROGENIC CLAUDICATION PRESENT: ICD-10-CM

## 2023-10-16 DIAGNOSIS — E66.01 CLASS 3 SEVERE OBESITY DUE TO EXCESS CALORIES WITH SERIOUS COMORBIDITY AND BODY MASS INDEX (BMI) OF 40.0 TO 44.9 IN ADULT (HCC): ICD-10-CM

## 2023-10-16 DIAGNOSIS — E78.2 MIXED HYPERLIPIDEMIA: ICD-10-CM

## 2023-10-16 DIAGNOSIS — Z11.59 ENCOUNTER FOR HEPATITIS C SCREENING TEST FOR LOW RISK PATIENT: ICD-10-CM

## 2023-10-16 DIAGNOSIS — I25.119 CORONARY ARTERY DISEASE INVOLVING NATIVE CORONARY ARTERY OF NATIVE HEART WITH ANGINA PECTORIS (HCC): ICD-10-CM

## 2023-10-16 DIAGNOSIS — Z11.4 ENCOUNTER FOR SCREENING FOR HIV: ICD-10-CM

## 2023-10-16 PROBLEM — E66.813 CLASS 3 SEVERE OBESITY DUE TO EXCESS CALORIES WITH SERIOUS COMORBIDITY AND BODY MASS INDEX (BMI) OF 40.0 TO 44.9 IN ADULT: Status: ACTIVE | Noted: 2023-10-16

## 2023-10-16 ASSESSMENT — PATIENT HEALTH QUESTIONNAIRE - PHQ9
2. FEELING DOWN, DEPRESSED OR HOPELESS: 0
SUM OF ALL RESPONSES TO PHQ9 QUESTIONS 1 & 2: 0
SUM OF ALL RESPONSES TO PHQ QUESTIONS 1-9: 0
1. LITTLE INTEREST OR PLEASURE IN DOING THINGS: 0

## 2023-10-16 ASSESSMENT — ENCOUNTER SYMPTOMS
DIARRHEA: 0
SORE THROAT: 0
WHEEZING: 0
SHORTNESS OF BREATH: 0
CONSTIPATION: 0
COUGH: 0
BACK PAIN: 1
RHINORRHEA: 0

## 2023-10-16 NOTE — ASSESSMENT & PLAN NOTE
-Well controlled and stable  -Meds = Trelegy  -rarely uses albuterol  -continue current medicaitons  -recommended that she continue to stop smoking (quit in Feb 2023)

## 2023-10-16 NOTE — ASSESSMENT & PLAN NOTE
Changes today = none  BP is controlled  Meds: beta-blocker  Labs: CMP, lipid panel, and urine microalbumin ordered today. Recommend lifestyle modifications such as weight loss, exercising for at least 120min/wk, and low sodium/DASH diet.

## 2023-10-16 NOTE — PROGRESS NOTES
Subjective     Patient ID: Mecca Culp is a 47 y.o. female who presents for New Patient and Back Pain (Ongoing nerve pain after surgery. Dr Lefty Leal gave Deshaun Yang and helped. July 2022. ). Had total hip replacement of right hip (Nov 2021) and laminectomy (July 2022). Previously seen by pain management and got back injections, with little relief. Hip pain has resolved. Still has some back pain, which radiates down left leg. Requesting Lyrica, which she thought Dr. Lefty Leal had prescribed in the past, although it was not seen at all on PDMP. Has tried PT in past for hip pain prior to hip replacement. Does not take any OTC meds for pain management. Wants to work on losing weight, but says it is hard for her to be super mobile. Mobility has improved since back surgery since she used to require a walker for ambulation. Does not watch her diet at this point, but knows that she needs to start making that a priority. Unsure of last time she had a pap smear, but says it was years ago. Review of Systems   Constitutional:  Negative for activity change, appetite change and fever. HENT:  Negative for congestion, rhinorrhea and sore throat. Respiratory:  Negative for cough, shortness of breath and wheezing. Gastrointestinal:  Negative for constipation and diarrhea. Musculoskeletal:  Positive for back pain. Negative for gait problem, joint swelling and myalgias. Skin:  Negative for rash. All other systems reviewed and are negative. Objective     Vitals:    10/16/23 1353   BP: 122/78   Pulse: 79   SpO2: 97%       Physical Exam  Vitals and nursing note reviewed. Constitutional:       General: She is not in acute distress. Appearance: Normal appearance. She is obese. She is not ill-appearing or toxic-appearing. HENT:      Head: Normocephalic and atraumatic. Nose: Nose normal.      Mouth/Throat:      Mouth: Mucous membranes are moist.      Pharynx: Oropharynx is clear.    Eyes:

## 2023-10-16 NOTE — PATIENT INSTRUCTIONS
-For pain can take up to Ibuprofen 600mg 4x per day and Tylenol 1000mg (2 tablets of 500mg) 4x per day  -use heating pad for back pain

## 2023-10-16 NOTE — ASSESSMENT & PLAN NOTE
-SHLOMO filled out for medical records from Dr. Lucy Hurd  -for now, recommend OTC medications such as Ibuprofen and Tylenol and heat for back pain

## 2023-10-17 PROBLEM — R73.03 PREDIABETES: Status: ACTIVE | Noted: 2023-10-17

## 2023-10-26 ENCOUNTER — PROCEDURE VISIT (OUTPATIENT)
Dept: CARDIOLOGY CLINIC | Age: 53
End: 2023-10-26

## 2023-10-26 DIAGNOSIS — I87.2 VENOUS INSUFFICIENCY: Primary | ICD-10-CM

## 2023-10-30 DIAGNOSIS — I10 PRIMARY HYPERTENSION: ICD-10-CM

## 2023-10-30 DIAGNOSIS — J44.9 COPD, SEVERE (HCC): ICD-10-CM

## 2023-10-31 RX ORDER — ATORVASTATIN CALCIUM 40 MG/1
40 TABLET, FILM COATED ORAL DAILY
Qty: 30 TABLET | Refills: 6 | Status: SHIPPED | OUTPATIENT
Start: 2023-10-31

## 2023-10-31 RX ORDER — FLUTICASONE FUROATE, UMECLIDINIUM BROMIDE AND VILANTEROL TRIFENATATE 100; 62.5; 25 UG/1; UG/1; UG/1
POWDER RESPIRATORY (INHALATION)
Qty: 1 EACH | Refills: 5 | Status: SHIPPED | OUTPATIENT
Start: 2023-10-31

## 2023-11-15 ENCOUNTER — TELEPHONE (OUTPATIENT)
Dept: CARDIOLOGY CLINIC | Age: 53
End: 2023-11-15

## 2023-11-15 NOTE — TELEPHONE ENCOUNTER
Per Grace العلي with [facility] appeal sent has been denied. She is faxing letter with reasons.  Call NAM#[id number]

## 2023-11-17 ENCOUNTER — TELEPHONE (OUTPATIENT)
Dept: CARDIOLOGY CLINIC | Age: 53
End: 2023-11-17

## 2023-11-17 NOTE — TELEPHONE ENCOUNTER
Left a msg for pt to inform her that we were notified that the appeal was also denied. Instructed that she would receive a letter from her insurance if she wants to explore further reviews she would need to initiate that with those instructions included. Advised to continue wearing compression stockings, elevate legs, exercise, diet & weight loss initiatives.  If she has further medical questions or would like a follow up appt to call the office

## 2024-01-17 ENCOUNTER — TELEPHONE (OUTPATIENT)
Dept: PULMONOLOGY | Age: 54
End: 2024-01-17

## 2024-01-17 NOTE — TELEPHONE ENCOUNTER
Spoke with patient regarding having trouble getting insurance to pay for Trelegy 100 mcg. I provided a 14 day sample.  She will need to fill out an assistance request from [employer] for help to pay for medication.

## 2024-01-26 ENCOUNTER — TELEPHONE (OUTPATIENT)
Dept: PULMONOLOGY | Age: 54
End: 2024-01-26

## 2024-01-30 ENCOUNTER — PATIENT MESSAGE (OUTPATIENT)
Dept: PULMONOLOGY | Age: 54
End: 2024-01-30

## 2024-01-30 NOTE — TELEPHONE ENCOUNTER
I spoke with Joao today and provided her with the website for medication assistance to get Trelegy are an affordable price.

## 2024-04-01 ENCOUNTER — TELEPHONE (OUTPATIENT)
Dept: PULMONOLOGY | Age: 54
End: 2024-04-01

## 2024-04-01 NOTE — TELEPHONE ENCOUNTER
Could you please call this patient back? She wants to talk to you about the [employer] assistance for her trelegy.

## 2024-04-30 ENCOUNTER — OFFICE VISIT (OUTPATIENT)
Dept: CARDIOLOGY CLINIC | Age: 54
End: 2024-04-30
Payer: COMMERCIAL

## 2024-04-30 VITALS
BODY MASS INDEX: 42.12 KG/M2 | HEIGHT: 69 IN | OXYGEN SATURATION: 96 % | HEART RATE: 77 BPM | DIASTOLIC BLOOD PRESSURE: 70 MMHG | WEIGHT: 284.4 LBS | SYSTOLIC BLOOD PRESSURE: 106 MMHG

## 2024-04-30 DIAGNOSIS — R06.02 SOB (SHORTNESS OF BREATH): ICD-10-CM

## 2024-04-30 DIAGNOSIS — I25.119 CORONARY ARTERY DISEASE INVOLVING NATIVE CORONARY ARTERY OF NATIVE HEART WITH ANGINA PECTORIS (HCC): Primary | ICD-10-CM

## 2024-04-30 DIAGNOSIS — E78.2 MIXED HYPERLIPIDEMIA: ICD-10-CM

## 2024-04-30 PROCEDURE — 1036F TOBACCO NON-USER: CPT | Performed by: NURSE PRACTITIONER

## 2024-04-30 PROCEDURE — G8427 DOCREV CUR MEDS BY ELIG CLIN: HCPCS | Performed by: NURSE PRACTITIONER

## 2024-04-30 PROCEDURE — 3078F DIAST BP <80 MM HG: CPT | Performed by: NURSE PRACTITIONER

## 2024-04-30 PROCEDURE — G8417 CALC BMI ABV UP PARAM F/U: HCPCS | Performed by: NURSE PRACTITIONER

## 2024-04-30 PROCEDURE — 93000 ELECTROCARDIOGRAM COMPLETE: CPT | Performed by: NURSE PRACTITIONER

## 2024-04-30 PROCEDURE — 3074F SYST BP LT 130 MM HG: CPT | Performed by: NURSE PRACTITIONER

## 2024-04-30 PROCEDURE — 99214 OFFICE O/P EST MOD 30 MIN: CPT | Performed by: NURSE PRACTITIONER

## 2024-04-30 PROCEDURE — 3017F COLORECTAL CA SCREEN DOC REV: CPT | Performed by: NURSE PRACTITIONER

## 2024-04-30 ASSESSMENT — ENCOUNTER SYMPTOMS
BACK PAIN: 1
ORTHOPNEA: 0
SHORTNESS OF BREATH: 0

## 2024-04-30 NOTE — PROGRESS NOTES
4/30/2024  Primary cardiologist: Dr. Victor    CC:   Joao  is an established 54 y.o.  female here for a follow up on CAD      SUBJECTIVE/OBJECTIVE:  HPI  Joao is a 54 y.o. female with a history of coronary artery disease, STEMI, PCI of LAD hypertension, hyperlipidemia, scoliosis, back pain, COPD and CVI  In June 2019 Joao presented to the ED with a STEMI involving the anterior wall.  Taken urgently to the Cath Lab where she underwent PCI to the LAD.    Jooa reports she is feeling well. She has had no chest pain or shortness of breath since her last visit.  States that she forgets to take her metoprolol.  Exercise limited due to back pain and scoliosis.    Review of Systems   Constitutional: Negative for diaphoresis and malaise/fatigue.   Cardiovascular:  Negative for chest pain, claudication, dyspnea on exertion, irregular heartbeat, leg swelling, near-syncope, orthopnea, palpitations and paroxysmal nocturnal dyspnea.   Respiratory:  Negative for shortness of breath.    Musculoskeletal:  Positive for back pain.   Neurological:  Negative for dizziness and light-headedness.       Vitals:    04/30/24 1603   BP: 106/70   Site: Left Upper Arm   Position: Sitting   Cuff Size: Large Adult   Pulse: 77   SpO2: 96%   Weight: 129 kg (284 lb 6.4 oz)   Height: 1.753 m (5' 9.02\")     Wt Readings from Last 3 Encounters:   04/30/24 129 kg (284 lb 6.4 oz)   10/16/23 126.1 kg (278 lb)   08/29/23 126.6 kg (279 lb)      Body mass index is 41.98 kg/m².     Physical Exam  Vitals reviewed.   Constitutional:       Appearance: She is obese.   Eyes:      Pupils: Pupils are equal, round, and reactive to light.   Neck:      Vascular: No carotid bruit.   Cardiovascular:      Rate and Rhythm: Normal rate and regular rhythm.      Pulses: Normal pulses.   Pulmonary:      Effort: Pulmonary effort is normal.      Breath sounds: Normal breath sounds. No rales.   Chest:      Chest wall: No tenderness.   Musculoskeletal:      Cervical back: No

## 2024-05-06 ENCOUNTER — OFFICE VISIT (OUTPATIENT)
Dept: FAMILY MEDICINE CLINIC | Age: 54
End: 2024-05-06

## 2024-05-06 VITALS
BODY MASS INDEX: 42.21 KG/M2 | HEIGHT: 69 IN | HEART RATE: 76 BPM | OXYGEN SATURATION: 97 % | DIASTOLIC BLOOD PRESSURE: 70 MMHG | RESPIRATION RATE: 16 BRPM | WEIGHT: 285 LBS | SYSTOLIC BLOOD PRESSURE: 100 MMHG

## 2024-05-06 DIAGNOSIS — Z12.31 BREAST CANCER SCREENING BY MAMMOGRAM: ICD-10-CM

## 2024-05-06 DIAGNOSIS — Z12.4 CERVICAL CANCER SCREENING: ICD-10-CM

## 2024-05-06 DIAGNOSIS — R61 NIGHT SWEATS: ICD-10-CM

## 2024-05-06 DIAGNOSIS — Z01.419 WELL WOMAN EXAM WITH ROUTINE GYNECOLOGICAL EXAM: Primary | ICD-10-CM

## 2024-05-06 DIAGNOSIS — M54.2 NECK PAIN: ICD-10-CM

## 2024-05-06 DIAGNOSIS — Z12.11 COLON CANCER SCREENING: ICD-10-CM

## 2024-05-06 RX ORDER — CYCLOBENZAPRINE HCL 5 MG
5 TABLET ORAL 2 TIMES DAILY PRN
Qty: 30 TABLET | Refills: 2 | Status: SHIPPED | OUTPATIENT
Start: 2024-05-06 | End: 2024-05-16

## 2024-05-06 ASSESSMENT — PATIENT HEALTH QUESTIONNAIRE - PHQ9
2. FEELING DOWN, DEPRESSED OR HOPELESS: SEVERAL DAYS
1. LITTLE INTEREST OR PLEASURE IN DOING THINGS: NOT AT ALL
SUM OF ALL RESPONSES TO PHQ9 QUESTIONS 1 & 2: 1
SUM OF ALL RESPONSES TO PHQ QUESTIONS 1-9: 1

## 2024-05-06 NOTE — PATIENT INSTRUCTIONS
up over time. You can start by adding one healthy food to your meals each day.    Try to make half your plate fruits and vegetables, one-fourth whole grains, and one-fourth lean proteins. Try including dairy with your meals.   Eat more fruits and vegetables. Try to have them with most meals and snacks.   Foods for healthy eating    Fruits    These can be fresh, frozen, canned, or dried.  Try to choose whole fruit rather than fruit juice.  Eat a variety of colors.    Vegetables    These can be fresh, frozen, canned, or dried.  Beans, peas, and lentils count too.    Whole grains    Choose whole-grain breads, cereals, and noodles.  Try brown rice.    Lean proteins    These can include lean meat, poultry, fish, and eggs.  You can also have tofu, beans, peas, lentils, nuts, and seeds.    Dairy    Try milk, yogurt, and cheese.  Choose low-fat or fat-free when you can.  If you need to, use lactose-free milk or fortified plant-based milk products, such as soy milk.    Water    Drink water when you're thirsty.  Limit sugar-sweetened drinks, including soda, fruit drinks, and sports drinks.  Where can you learn more?  Go to https://www.[employer].net/patientEd and enter T756 to learn more about \"Eating Healthy Foods: Care Instructions.\"  Current as of: September 20, 2023               Content Version: 14.0  © 3016-8876 [employer].   Care instructions adapted under license by [employer]. If you have questions about a medical condition or this instruction, always ask your healthcare professional. [employer] disclaims any warranty or liability for your use of this information.           Well Visit, Ages 18 to 65: Care Instructions  Well visits can help you stay healthy. Your doctor has checked your overall health and may have suggested ways to take good care of yourself. Your doctor also may have recommended tests. You can help prevent illness with healthy eating, good sleep, vaccinations, regular

## 2024-05-06 NOTE — PROGRESS NOTES
lesions.      Cervix: No cervical motion tenderness, discharge, friability, lesion, erythema or cervical bleeding.      Uterus: Normal. Not enlarged and not tender.       Adnexa: Right adnexa normal and left adnexa normal.      Rectum: Normal.   Musculoskeletal:         General: Normal range of motion.      Cervical back: Normal range of motion and neck supple. No tenderness.      Right lower leg: No edema.      Left lower leg: No edema.   Lymphadenopathy:      Cervical: No cervical adenopathy.      Upper Body:      Right upper body: No supraclavicular or axillary adenopathy.      Left upper body: No supraclavicular or axillary adenopathy.   Skin:     General: Skin is warm.      Findings: No lesion or rash.   Neurological:      General: No focal deficit present.      Mental Status: She is alert and oriented to person, place, and time. Mental status is at baseline.      Motor: No weakness.      Gait: Gait normal.   Psychiatric:         Mood and Affect: Mood normal.         Behavior: Behavior normal.         Thought Content: Thought content normal.         Judgment: Judgment normal.         Assessment   Plan   1. Well woman exam with routine gynecological exam  2. Cervical cancer screening  -     PAP SMEAR; Future  3. Breast cancer screening by mammogram  -     WENCESLAO Screening Bilateral; Future  4. Colon cancer screening  -     Nicole Estevez MD, Gastroenterology, [place]  5. Neck pain  Comments:  Accompanied by numbness down bilateral hands.  Imaging ordered.   Flexeril sent to pharmacy. Continue OTC meds for symptom relief.  Recommend heating pad  Orders:  -     XR CERVICAL SPINE (4-5 VIEWS); Future  6. Night sweats  Comments:  Likely 2/2 menopausal age. Would recommend against HRT at this time due to multiple risk factors for blood clots such as obesity, fairly sedentary, & smoking Hx  Orders:  -     CBC with Auto Differential; Future  -     TSH with Reflex; Future         Personalized Preventive Plan

## 2024-05-07 ASSESSMENT — ENCOUNTER SYMPTOMS
DIARRHEA: 0
RHINORRHEA: 0
SHORTNESS OF BREATH: 0
COUGH: 0
SORE THROAT: 0
CONSTIPATION: 0
BACK PAIN: 1
WHEEZING: 0

## 2024-05-09 ENCOUNTER — TELEPHONE (OUTPATIENT)
Dept: GASTROENTEROLOGY | Age: 54
End: 2024-05-09

## 2024-05-09 LAB
HPV HR 12 DNA SPEC QL NAA+PROBE: DETECTED
HPV16 DNA SPEC QL NAA+PROBE: NOT DETECTED
HPV16+18+H RISK 12 DNA SPEC-IMP: ABNORMAL
HPV18 DNA SPEC QL NAA+PROBE: NOT DETECTED

## 2024-05-20 ENCOUNTER — TELEPHONE (OUTPATIENT)
Dept: PULMONOLOGY | Age: 54
End: 2024-05-20

## 2024-05-20 ENCOUNTER — TELEPHONE (OUTPATIENT)
Dept: FAMILY MEDICINE CLINIC | Age: 54
End: 2024-05-20

## 2024-05-20 DIAGNOSIS — I25.119 CORONARY ARTERY DISEASE INVOLVING NATIVE CORONARY ARTERY OF NATIVE HEART WITH ANGINA PECTORIS (HCC): ICD-10-CM

## 2024-05-20 DIAGNOSIS — J44.9 COPD, SEVERE (HCC): Primary | ICD-10-CM

## 2024-05-20 NOTE — TELEPHONE ENCOUNTER
----- Message from Jose R Brito sent at 5/20/2024 10:43 AM EDT -----  Regarding: E[facility] Referral Request  E[facility] Referral Request    Reason for referral request: Lab/Test Order    Specialist/Lab/Test patient is requesting (if known):    Specialist Phone Number (if applicable):    Additional Information Patient called in to request for a referral for her annual lung screening test.  -------------------------------------------------------------------------------------------------------------------------    Relationship to Patient: Self     Call Back Information: OK to leave message on voicemail  Preferred Call Back Number: Phone [phone]

## 2024-05-20 NOTE — TELEPHONE ENCOUNTER
Patient called in asking about an order for a low dose CT scan to be done. Order placed, called patient back to let her know it's in the chart and she'll need to contact central scheduling, left message

## 2024-05-30 ENCOUNTER — HOSPITAL ENCOUNTER (OUTPATIENT)
Age: 54
Discharge: HOME | End: 2024-05-30
Payer: COMMERCIAL

## 2024-05-30 ENCOUNTER — HOSPITAL ENCOUNTER (OUTPATIENT)
Dept: GENERAL RADIOLOGY | Age: 54
Discharge: HOME | End: 2024-05-30
Payer: COMMERCIAL

## 2024-05-30 ENCOUNTER — HOSPITAL ENCOUNTER (OUTPATIENT)
Dept: CT IMAGING | Age: 54
Discharge: HOME | End: 2024-05-30
Payer: COMMERCIAL

## 2024-05-30 DIAGNOSIS — R61 NIGHT SWEATS: ICD-10-CM

## 2024-05-30 DIAGNOSIS — J44.9 COPD, SEVERE (HCC): ICD-10-CM

## 2024-05-30 DIAGNOSIS — I25.119 CORONARY ARTERY DISEASE INVOLVING NATIVE CORONARY ARTERY OF NATIVE HEART WITH ANGINA PECTORIS (HCC): ICD-10-CM

## 2024-05-30 DIAGNOSIS — M54.2 NECK PAIN: ICD-10-CM

## 2024-05-30 LAB
BASOPHILS ABSOLUTE: 0.1 K/CU MM
BASOPHILS RELATIVE PERCENT: 1.1 % (ref 0–1)
DIFFERENTIAL TYPE: ABNORMAL
EOSINOPHILS ABSOLUTE: 0.3 K/CU MM
EOSINOPHILS RELATIVE PERCENT: 3.2 % (ref 0–3)
HCT VFR BLD CALC: 37 % (ref 37–47)
HEMOGLOBIN: 11.5 GM/DL (ref 12.5–16)
IMMATURE NEUTROPHIL %: 0.2 % (ref 0–0.43)
LYMPHOCYTES ABSOLUTE: 3 K/CU MM
LYMPHOCYTES RELATIVE PERCENT: 37 % (ref 24–44)
MCH RBC QN AUTO: 29 PG (ref 27–31)
MCHC RBC AUTO-ENTMCNC: 31.1 % (ref 32–36)
MCV RBC AUTO: 93.2 FL (ref 78–100)
MONOCYTES ABSOLUTE: 0.8 K/CU MM
MONOCYTES RELATIVE PERCENT: 9.3 % (ref 0–4)
NEUTROPHILS ABSOLUTE: 3.9 K/CU MM
NEUTROPHILS RELATIVE PERCENT: 49.2 % (ref 36–66)
NUCLEATED RBC %: 0 %
PDW BLD-RTO: 14.3 % (ref 11.7–14.9)
PLATELET # BLD: 390 K/CU MM (ref 140–440)
PMV BLD AUTO: 9 FL (ref 7.5–11.1)
RBC # BLD: 3.97 M/CU MM (ref 4.2–5.4)
TOTAL IMMATURE NEUTOROPHIL: 0.02 K/CU MM
TOTAL NUCLEATED RBC: 0 K/CU MM
TSH SERPL DL<=0.005 MIU/L-ACNC: 1.3 UIU/ML (ref 0.27–4.2)
WBC # BLD: 8 K/CU MM (ref 4–10.5)

## 2024-05-30 PROCEDURE — 36415 COLL VENOUS BLD VENIPUNCTURE: CPT

## 2024-05-30 PROCEDURE — 84443 ASSAY THYROID STIM HORMONE: CPT

## 2024-05-30 PROCEDURE — 71271 CT THORAX LUNG CANCER SCR C-: CPT

## 2024-05-30 PROCEDURE — 72050 X-RAY EXAM NECK SPINE 4/5VWS: CPT

## 2024-05-30 PROCEDURE — 85025 COMPLETE CBC W/AUTO DIFF WBC: CPT

## 2024-06-04 ENCOUNTER — COMMUNITY OUTREACH (OUTPATIENT)
Dept: FAMILY MEDICINE CLINIC | Age: 54
End: 2024-06-04

## 2024-06-04 ENCOUNTER — PATIENT MESSAGE (OUTPATIENT)
Dept: PULMONOLOGY | Age: 54
End: 2024-06-04

## 2024-06-04 NOTE — PROGRESS NOTES
Patient's HM shows they are overdue for Mammogram and Colorectal Screening.   Care Everywhere and  files searched.  No results to attach to order nor HM updated.     Mammogram and GI office visit scheduled.

## 2024-06-07 DIAGNOSIS — J44.9 COPD, SEVERE (HCC): ICD-10-CM

## 2024-06-07 RX ORDER — FLUTICASONE FUROATE, UMECLIDINIUM BROMIDE AND VILANTEROL TRIFENATATE 100; 62.5; 25 UG/1; UG/1; UG/1
POWDER RESPIRATORY (INHALATION)
Qty: 1 EACH | Refills: 0 | Status: SHIPPED | OUTPATIENT
Start: 2024-06-07

## 2024-08-08 ENCOUNTER — TELEMEDICINE (OUTPATIENT)
Dept: FAMILY MEDICINE CLINIC | Age: 54
End: 2024-08-08
Payer: COMMERCIAL

## 2024-08-08 DIAGNOSIS — F33.0 MILD EPISODE OF RECURRENT MAJOR DEPRESSIVE DISORDER (HCC): Primary | ICD-10-CM

## 2024-08-08 DIAGNOSIS — J44.9 COPD, SEVERE (HCC): ICD-10-CM

## 2024-08-08 DIAGNOSIS — I10 PRIMARY HYPERTENSION: ICD-10-CM

## 2024-08-08 DIAGNOSIS — M48.061 SPINAL STENOSIS OF LUMBAR REGION, UNSPECIFIED WHETHER NEUROGENIC CLAUDICATION PRESENT: ICD-10-CM

## 2024-08-08 DIAGNOSIS — F41.9 ANXIETY: ICD-10-CM

## 2024-08-08 PROCEDURE — 3023F SPIROM DOC REV: CPT | Performed by: STUDENT IN AN ORGANIZED HEALTH CARE EDUCATION/TRAINING PROGRAM

## 2024-08-08 PROCEDURE — 3017F COLORECTAL CA SCREEN DOC REV: CPT | Performed by: STUDENT IN AN ORGANIZED HEALTH CARE EDUCATION/TRAINING PROGRAM

## 2024-08-08 PROCEDURE — 1036F TOBACCO NON-USER: CPT | Performed by: STUDENT IN AN ORGANIZED HEALTH CARE EDUCATION/TRAINING PROGRAM

## 2024-08-08 PROCEDURE — G8427 DOCREV CUR MEDS BY ELIG CLIN: HCPCS | Performed by: STUDENT IN AN ORGANIZED HEALTH CARE EDUCATION/TRAINING PROGRAM

## 2024-08-08 PROCEDURE — G8417 CALC BMI ABV UP PARAM F/U: HCPCS | Performed by: STUDENT IN AN ORGANIZED HEALTH CARE EDUCATION/TRAINING PROGRAM

## 2024-08-08 PROCEDURE — 99214 OFFICE O/P EST MOD 30 MIN: CPT | Performed by: STUDENT IN AN ORGANIZED HEALTH CARE EDUCATION/TRAINING PROGRAM

## 2024-08-08 RX ORDER — FLUTICASONE PROPIONATE AND SALMETEROL 250; 50 UG/1; UG/1
1 POWDER RESPIRATORY (INHALATION) EVERY 12 HOURS
Qty: 60 EACH | Refills: 3 | Status: SHIPPED | OUTPATIENT
Start: 2024-08-08

## 2024-08-08 RX ORDER — DULOXETIN HYDROCHLORIDE 30 MG/1
30 CAPSULE, DELAYED RELEASE ORAL DAILY
Qty: 90 CAPSULE | Refills: 1 | Status: SHIPPED | OUTPATIENT
Start: 2024-08-08

## 2024-08-08 RX ORDER — ATORVASTATIN CALCIUM 40 MG/1
40 TABLET, FILM COATED ORAL DAILY
Qty: 30 TABLET | Refills: 5 | Status: SHIPPED | OUTPATIENT
Start: 2024-08-08

## 2024-08-08 SDOH — ECONOMIC STABILITY: FOOD INSECURITY: WITHIN THE PAST 12 MONTHS, THE FOOD YOU BOUGHT JUST DIDN'T LAST AND YOU DIDN'T HAVE MONEY TO GET MORE.: NEVER TRUE

## 2024-08-08 SDOH — ECONOMIC STABILITY: HOUSING INSECURITY
IN THE LAST 12 MONTHS, WAS THERE A TIME WHEN YOU DID NOT HAVE A STEADY PLACE TO SLEEP OR SLEPT IN A SHELTER (INCLUDING NOW)?: NO

## 2024-08-08 SDOH — ECONOMIC STABILITY: FOOD INSECURITY: WITHIN THE PAST 12 MONTHS, YOU WORRIED THAT YOUR FOOD WOULD RUN OUT BEFORE YOU GOT MONEY TO BUY MORE.: NEVER TRUE

## 2024-08-08 SDOH — ECONOMIC STABILITY: INCOME INSECURITY: HOW HARD IS IT FOR YOU TO PAY FOR THE VERY BASICS LIKE FOOD, HOUSING, MEDICAL CARE, AND HEATING?: SOMEWHAT HARD

## 2024-08-08 ASSESSMENT — PATIENT HEALTH QUESTIONNAIRE - PHQ9
10. IF YOU CHECKED OFF ANY PROBLEMS, HOW DIFFICULT HAVE THESE PROBLEMS MADE IT FOR YOU TO DO YOUR WORK, TAKE CARE OF THINGS AT HOME, OR GET ALONG WITH OTHER PEOPLE: SOMEWHAT DIFFICULT
8. MOVING OR SPEAKING SO SLOWLY THAT OTHER PEOPLE COULD HAVE NOTICED. OR THE OPPOSITE, BEING SO FIGETY OR RESTLESS THAT YOU HAVE BEEN MOVING AROUND A LOT MORE THAN USUAL: NOT AT ALL
6. FEELING BAD ABOUT YOURSELF - OR THAT YOU ARE A FAILURE OR HAVE LET YOURSELF OR YOUR FAMILY DOWN: NOT AT ALL
SUM OF ALL RESPONSES TO PHQ QUESTIONS 1-9: 8
5. POOR APPETITE OR OVEREATING: SEVERAL DAYS
4. FEELING TIRED OR HAVING LITTLE ENERGY: SEVERAL DAYS
SUM OF ALL RESPONSES TO PHQ QUESTIONS 1-9: 8
3. TROUBLE FALLING OR STAYING ASLEEP: SEVERAL DAYS
7. TROUBLE CONCENTRATING ON THINGS, SUCH AS READING THE NEWSPAPER OR WATCHING TELEVISION: NEARLY EVERY DAY
2. FEELING DOWN, DEPRESSED OR HOPELESS: SEVERAL DAYS
SUM OF ALL RESPONSES TO PHQ QUESTIONS 1-9: 8
SUM OF ALL RESPONSES TO PHQ QUESTIONS 1-9: 8
1. LITTLE INTEREST OR PLEASURE IN DOING THINGS: SEVERAL DAYS
SUM OF ALL RESPONSES TO PHQ9 QUESTIONS 1 & 2: 2
9. THOUGHTS THAT YOU WOULD BE BETTER OFF DEAD, OR OF HURTING YOURSELF: NOT AT ALL

## 2024-08-08 ASSESSMENT — ANXIETY QUESTIONNAIRES
5. BEING SO RESTLESS THAT IT IS HARD TO SIT STILL: NOT AT ALL
7. FEELING AFRAID AS IF SOMETHING AWFUL MIGHT HAPPEN: MORE THAN HALF THE DAYS
2. NOT BEING ABLE TO STOP OR CONTROL WORRYING: SEVERAL DAYS
6. BECOMING EASILY ANNOYED OR IRRITABLE: MORE THAN HALF THE DAYS
1. FEELING NERVOUS, ANXIOUS, OR ON EDGE: SEVERAL DAYS
4. TROUBLE RELAXING: SEVERAL DAYS
3. WORRYING TOO MUCH ABOUT DIFFERENT THINGS: MORE THAN HALF THE DAYS
GAD7 TOTAL SCORE: 9
IF YOU CHECKED OFF ANY PROBLEMS ON THIS QUESTIONNAIRE, HOW DIFFICULT HAVE THESE PROBLEMS MADE IT FOR YOU TO DO YOUR WORK, TAKE CARE OF THINGS AT HOME, OR GET ALONG WITH OTHER PEOPLE: SOMEWHAT DIFFICULT

## 2024-08-08 ASSESSMENT — ENCOUNTER SYMPTOMS
WHEEZING: 1
SHORTNESS OF BREATH: 1
RHINORRHEA: 0
SORE THROAT: 0
DIARRHEA: 0
COUGH: 0
CONSTIPATION: 0

## 2024-08-08 NOTE — ASSESSMENT & PLAN NOTE
-poorly controlled, but unable to afford trelegy  -will try Advair. Sending to [employer] instead of [employer] since [employer] kevin has coupon for $49  -referring to [email or web address] for financial assistance

## 2024-08-08 NOTE — PROGRESS NOTES
Joao Desir, was evaluated through a synchronous (real-time) audio-video encounter. The patient (or guardian if applicable) is aware that this is a billable service, which includes applicable co-pays. This Virtual Visit was conducted with patient's (and/or legal guardian's) consent. Patient identification was verified, and a caregiver was present when appropriate.   The patient was located at Home: [address] [id number]  Provider was located at Facility (Appt Dept): [facility]  [place] [address]  Confirm you are appropriately licensed, registered, or certified to deliver care in the state where the patient is located as indicated above. If you are not or unsure, please re-schedule the visit: Yes, I confirm.     Joao Desir (:  1970) is a Established patient, presenting virtually for evaluation of the following:    Assessment & Plan   Below is the assessment and plan developed based on review of pertinent history, physical exam, labs, studies, and medications.  1. Mild episode of recurrent major depressive disorder (HCC)  Assessment & Plan:  -PHQ9 = 8 >> Reviewed, and indicates mild depression.  -Reviewed concept of anxiety as biochemical imbalance of neurotransmitters and rationale for treatment.  Instructed patient to contact office or on-call physician promptly should condition worsen or any new symptoms appear and provided on-call telephone numbers. IF THE PATIENT HAS ANY SUICIDAL OR HOMICIDAL IDEATIONS, CALL THE OFFICE, DISCUSS WITH A SUPPORT MEMBER, OR GO TO THE ER IMMEDIATELY. Patient was agreeable with this plan.  Follow up: 3 months.  Starting cymbalta 30      Orders:  -     DULoxetine (CYMBALTA) 30 MG extended release capsule; Take 1 capsule by mouth daily, Disp-90 capsule, R-1Normal  2. Anxiety  Assessment & Plan:  -GAD7 = 9 >> Reviewed, and indicatesmoderate anxiety.  -Reviewed concept of anxiety as biochemical imbalance of neurotransmitters and

## 2024-08-08 NOTE — ASSESSMENT & PLAN NOTE
-PHQ9 = 8 >> Reviewed, and indicates mild depression.  -Reviewed concept of anxiety as biochemical imbalance of neurotransmitters and rationale for treatment.  Instructed patient to contact office or on-call physician promptly should condition worsen or any new symptoms appear and provided on-call telephone numbers. IF THE PATIENT HAS ANY SUICIDAL OR HOMICIDAL IDEATIONS, CALL THE OFFICE, DISCUSS WITH A SUPPORT MEMBER, OR GO TO THE ER IMMEDIATELY. Patient was agreeable with this plan.  Follow up: 3 months.  Starting cymbalta 30

## 2024-08-08 NOTE — ASSESSMENT & PLAN NOTE
-GAD7 = 9 >> Reviewed, and indicatesmoderate anxiety.  -Reviewed concept of anxiety as biochemical imbalance of neurotransmitters and rationale for treatment.  Instructed patient to contact office or on-call physician promptly should condition worsen or any new symptoms appear and provided on-call telephone numbers. IF THE PATIENT HAS ANY SUICIDAL OR HOMICIDAL IDEATIONS, CALL THE OFFICE, DISCUSS WITH A SUPPORT MEMBER, OR GO TO THE ER IMMEDIATELY. Patient was agreeable with this plan.  Follow up: 3 months.  Starting on cymbalta 30mg

## 2024-08-08 NOTE — ASSESSMENT & PLAN NOTE
Recommended she get in contact with Dr. Knott's office for follow up since she has had a surgery with him in past.   Starting on cymbalta to hopefully have some added pain relief effects

## 2024-11-04 NOTE — TELEPHONE ENCOUNTER
Please advise.    [FreeTextEntry1] : This is a 65 y/o female with a pmhx of HLD, HTN, Hypothyroid, PreDM, Alpha thalassemia trait who presents for follow up.pt seeing ortho forr shoulder and knee pain .pt has not gotten ct chest and ct abdomen asds of yet .pt with facial pain pt denies any chest  pain dizziness ,lightheadedness ,nausea vomiting diaphoresis

## 2024-11-15 DIAGNOSIS — J44.9 COPD, SEVERE (HCC): ICD-10-CM

## 2024-11-16 RX ORDER — FLUTICASONE FUROATE, UMECLIDINIUM BROMIDE AND VILANTEROL TRIFENATATE 100; 62.5; 25 UG/1; UG/1; UG/1
POWDER RESPIRATORY (INHALATION)
Qty: 1 EACH | Refills: 2 | Status: SHIPPED | OUTPATIENT
Start: 2024-11-16

## 2025-02-13 ENCOUNTER — TELEMEDICINE (OUTPATIENT)
Dept: FAMILY MEDICINE CLINIC | Age: 55
End: 2025-02-13

## 2025-02-13 DIAGNOSIS — F33.0 MILD EPISODE OF RECURRENT MAJOR DEPRESSIVE DISORDER (HCC): Primary | ICD-10-CM

## 2025-02-13 DIAGNOSIS — J44.9 COPD, SEVERE (HCC): ICD-10-CM

## 2025-02-13 DIAGNOSIS — R73.03 PREDIABETES: ICD-10-CM

## 2025-02-13 DIAGNOSIS — F41.9 ANXIETY: ICD-10-CM

## 2025-02-13 DIAGNOSIS — I25.119 CORONARY ARTERY DISEASE INVOLVING NATIVE CORONARY ARTERY OF NATIVE HEART WITH ANGINA PECTORIS (HCC): ICD-10-CM

## 2025-02-13 DIAGNOSIS — I10 PRIMARY HYPERTENSION: ICD-10-CM

## 2025-02-13 DIAGNOSIS — E78.2 MIXED HYPERLIPIDEMIA: ICD-10-CM

## 2025-02-13 DIAGNOSIS — Z13.29 THYROID DISORDER SCREEN: ICD-10-CM

## 2025-02-13 RX ORDER — NITROGLYCERIN 0.3 MG/1
0.3 TABLET SUBLINGUAL EVERY 5 MIN PRN
Qty: 30 TABLET | Refills: 1 | Status: SHIPPED | OUTPATIENT
Start: 2025-02-13 | End: 2025-02-28

## 2025-02-13 RX ORDER — NITROGLYCERIN 0.3 MG/1
0.3 TABLET SUBLINGUAL EVERY 5 MIN PRN
Qty: 30 TABLET | Refills: 0 | Status: CANCELLED | OUTPATIENT
Start: 2025-02-13 | End: 2025-02-28

## 2025-02-13 RX ORDER — FLUTICASONE FUROATE, UMECLIDINIUM BROMIDE AND VILANTEROL TRIFENATATE 100; 62.5; 25 UG/1; UG/1; UG/1
POWDER RESPIRATORY (INHALATION)
Qty: 1 EACH | Refills: 2 | Status: CANCELLED | OUTPATIENT
Start: 2025-02-13

## 2025-02-13 RX ORDER — ATORVASTATIN CALCIUM 40 MG/1
40 TABLET, FILM COATED ORAL DAILY
Qty: 90 TABLET | Refills: 1 | Status: SHIPPED | OUTPATIENT
Start: 2025-02-13

## 2025-02-13 RX ORDER — ATORVASTATIN CALCIUM 40 MG/1
40 TABLET, FILM COATED ORAL DAILY
Qty: 30 TABLET | Refills: 5 | Status: CANCELLED | OUTPATIENT
Start: 2025-02-13

## 2025-02-13 RX ORDER — HYDROXYZINE HYDROCHLORIDE 25 MG/1
25 TABLET, FILM COATED ORAL NIGHTLY
Qty: 90 TABLET | Refills: 1 | Status: SHIPPED | OUTPATIENT
Start: 2025-02-13 | End: 2025-03-15

## 2025-02-13 RX ORDER — FLUTICASONE FUROATE, UMECLIDINIUM BROMIDE AND VILANTEROL TRIFENATATE 100; 62.5; 25 UG/1; UG/1; UG/1
POWDER RESPIRATORY (INHALATION)
Qty: 1 EACH | Refills: 2 | Status: SHIPPED | OUTPATIENT
Start: 2025-02-13

## 2025-02-13 SDOH — ECONOMIC STABILITY: TRANSPORTATION INSECURITY
IN THE PAST 12 MONTHS, HAS THE LACK OF TRANSPORTATION KEPT YOU FROM MEDICAL APPOINTMENTS OR FROM GETTING MEDICATIONS?: NO

## 2025-02-13 SDOH — ECONOMIC STABILITY: TRANSPORTATION INSECURITY
IN THE PAST 12 MONTHS, HAS LACK OF TRANSPORTATION KEPT YOU FROM MEETINGS, WORK, OR FROM GETTING THINGS NEEDED FOR DAILY LIVING?: NO

## 2025-02-13 SDOH — ECONOMIC STABILITY: FOOD INSECURITY: WITHIN THE PAST 12 MONTHS, YOU WORRIED THAT YOUR FOOD WOULD RUN OUT BEFORE YOU GOT MONEY TO BUY MORE.: NEVER TRUE

## 2025-02-13 SDOH — ECONOMIC STABILITY: FOOD INSECURITY: WITHIN THE PAST 12 MONTHS, THE FOOD YOU BOUGHT JUST DIDN'T LAST AND YOU DIDN'T HAVE MONEY TO GET MORE.: NEVER TRUE

## 2025-02-13 SDOH — ECONOMIC STABILITY: INCOME INSECURITY: IN THE LAST 12 MONTHS, WAS THERE A TIME WHEN YOU WERE NOT ABLE TO PAY THE MORTGAGE OR RENT ON TIME?: NO

## 2025-02-13 ASSESSMENT — PATIENT HEALTH QUESTIONNAIRE - PHQ9
SUM OF ALL RESPONSES TO PHQ QUESTIONS 1-9: 2
4. FEELING TIRED OR HAVING LITTLE ENERGY: SEVERAL DAYS
6. FEELING BAD ABOUT YOURSELF - OR THAT YOU ARE A FAILURE OR HAVE LET YOURSELF OR YOUR FAMILY DOWN: NOT AT ALL
1. LITTLE INTEREST OR PLEASURE IN DOING THINGS: NOT AT ALL
SUM OF ALL RESPONSES TO PHQ QUESTIONS 1-9: 2
7. TROUBLE CONCENTRATING ON THINGS, SUCH AS READING THE NEWSPAPER OR WATCHING TELEVISION: NOT AT ALL
3. TROUBLE FALLING OR STAYING ASLEEP: SEVERAL DAYS
9. THOUGHTS THAT YOU WOULD BE BETTER OFF DEAD, OR OF HURTING YOURSELF: NOT AT ALL
2. FEELING DOWN, DEPRESSED OR HOPELESS: NOT AT ALL
5. POOR APPETITE OR OVEREATING: NOT AT ALL
10. IF YOU CHECKED OFF ANY PROBLEMS, HOW DIFFICULT HAVE THESE PROBLEMS MADE IT FOR YOU TO DO YOUR WORK, TAKE CARE OF THINGS AT HOME, OR GET ALONG WITH OTHER PEOPLE: SOMEWHAT DIFFICULT
8. MOVING OR SPEAKING SO SLOWLY THAT OTHER PEOPLE COULD HAVE NOTICED. OR THE OPPOSITE, BEING SO FIGETY OR RESTLESS THAT YOU HAVE BEEN MOVING AROUND A LOT MORE THAN USUAL: NOT AT ALL
SUM OF ALL RESPONSES TO PHQ QUESTIONS 1-9: 2
SUM OF ALL RESPONSES TO PHQ9 QUESTIONS 1 & 2: 0
SUM OF ALL RESPONSES TO PHQ QUESTIONS 1-9: 2

## 2025-02-13 ASSESSMENT — ANXIETY QUESTIONNAIRES
7. FEELING AFRAID AS IF SOMETHING AWFUL MIGHT HAPPEN: NOT AT ALL
1. FEELING NERVOUS, ANXIOUS, OR ON EDGE: SEVERAL DAYS
3. WORRYING TOO MUCH ABOUT DIFFERENT THINGS: NOT AT ALL
2. NOT BEING ABLE TO STOP OR CONTROL WORRYING: SEVERAL DAYS
6. BECOMING EASILY ANNOYED OR IRRITABLE: NOT AT ALL
GAD7 TOTAL SCORE: 3
4. TROUBLE RELAXING: SEVERAL DAYS
5. BEING SO RESTLESS THAT IT IS HARD TO SIT STILL: NOT AT ALL

## 2025-02-13 ASSESSMENT — ENCOUNTER SYMPTOMS
WHEEZING: 0
SHORTNESS OF BREATH: 0
CONSTIPATION: 0
COUGH: 0
SORE THROAT: 0
DIARRHEA: 0
RHINORRHEA: 0

## 2025-02-13 NOTE — ASSESSMENT & PLAN NOTE
Changes today= none  Meds: Statin  Last lipid panel = today  Recommend lifestyle modifications such as weight loss, daily exercise for a total of at least 120min/wk, and Mediterranean diet.       Orders:    CBC with Auto Differential; Future    Comprehensive Metabolic Panel w/ Reflex to MG; Future    Lipid Panel; Future

## 2025-02-13 NOTE — PROGRESS NOTES
Joao Desir, was evaluated through a synchronous (real-time) audio-video encounter. The patient (or guardian if applicable) is aware that this is a billable service, which includes applicable co-pays. This Virtual Visit was conducted with patient's (and/or legal guardian's) consent. Patient identification was verified, and a caregiver was present when appropriate.   The patient was located at Home: [address] [id number]  Provider was located at Facility (Appt Dept): [address] [facility] [id number]  Confirm you are appropriately licensed, registered, or certified to deliver care in the state where the patient is located as indicated above. If you are not or unsure, please re-schedule the visit: Yes, I confirm.     Joao Desir (:  1970) is a Established patient, presenting virtually for evaluation of the following:      Below is the assessment and plan developed based on review of pertinent history, physical exam, labs, studies, and medications.     Assessment & Plan  Mild episode of recurrent major depressive disorder (HCC)   -PHQ9 = 2 >> Reviewed, and indicates depression well-controlled with current medication.  - Continue Cymbalta 30           Anxiety  -GAD7 = 3 >> Reviewed, and indicate anxiety well-controlled with current medication.  - Continue Cymbalta 30.  Will add hydroxyzine nightly to help with anxiety at night.      Orders:    hydrOXYzine HCl (ATARAX) 25 MG tablet; Take 1 tablet by mouth nightly    COPD, severe (HCC)  Well-controlled at this time.  Continue Trelegy    Orders:    fluticasone-umeclidin-vilant (TRELEGY ELLIPTA) 100-62.5-25 MCG/ACT AEPB inhaler; INHALE ONE PUFF INTO THE LUNGS DAILY    Mixed hyperlipidemia   Changes today= none  Meds: Statin  Last lipid panel = today  Recommend lifestyle modifications such as weight loss, daily exercise for a total of at least 120min/wk, and Mediterranean diet.       Orders:    CBC with Auto

## 2025-02-13 NOTE — ASSESSMENT & PLAN NOTE
Orders:    nitroGLYCERIN (NITROSTAT) 0.3 MG SL tablet; Place 1 tablet under the tongue every 5 minutes as needed for Chest pain up to max of 3 total doses. If no relief after 1 dose, call 911.

## 2025-02-13 NOTE — ASSESSMENT & PLAN NOTE
-Last A1c = 5.7%  -recommend lifestyle changes such as regular exercise, weight loss, and cutting back on sugars and starchy carbs.       Orders:    Hemoglobin A1C; Future

## 2025-02-13 NOTE — ASSESSMENT & PLAN NOTE
-PHQ9 = 2 >> Reviewed, and indicates depression well-controlled with current medication.  -Continue Cymbalta 30

## 2025-02-13 NOTE — ASSESSMENT & PLAN NOTE
-GAD7 = 3 >> Reviewed, and indicate anxiety well-controlled with current medication.  -Continue Cymbalta 30.  Will add hydroxyzine nightly to help with anxiety at night.      Orders:    hydrOXYzine HCl (ATARAX) 25 MG tablet; Take 1 tablet by mouth nightly

## 2025-02-13 NOTE — ASSESSMENT & PLAN NOTE
Well-controlled at this time.  Continue Trelegy    Orders:    fluticasone-umeclidin-vilant (TRELEGY ELLIPTA) 100-62.5-25 MCG/ACT AEPB inhaler; INHALE ONE PUFF INTO THE LUNGS DAILY

## 2025-04-25 ENCOUNTER — COMMUNITY OUTREACH (OUTPATIENT)
Dept: FAMILY MEDICINE CLINIC | Age: 55
End: 2025-04-25

## 2025-06-10 DIAGNOSIS — J44.9 COPD, SEVERE (HCC): ICD-10-CM

## 2025-06-11 RX ORDER — FLUTICASONE FUROATE, UMECLIDINIUM BROMIDE AND VILANTEROL TRIFENATATE 100; 62.5; 25 UG/1; UG/1; UG/1
POWDER RESPIRATORY (INHALATION)
Qty: 1 EACH | Refills: 2 | Status: SHIPPED | OUTPATIENT
Start: 2025-06-11

## 2025-08-27 DIAGNOSIS — F33.0 MILD EPISODE OF RECURRENT MAJOR DEPRESSIVE DISORDER: ICD-10-CM

## 2025-08-27 DIAGNOSIS — F41.9 ANXIETY: ICD-10-CM

## 2025-08-27 DIAGNOSIS — J44.9 COPD, SEVERE (HCC): ICD-10-CM

## 2025-08-27 RX ORDER — DULOXETIN HYDROCHLORIDE 30 MG/1
30 CAPSULE, DELAYED RELEASE ORAL DAILY
Qty: 30 CAPSULE | Refills: 0 | Status: SHIPPED | OUTPATIENT
Start: 2025-08-27

## 2025-08-27 RX ORDER — FLUTICASONE FUROATE, UMECLIDINIUM BROMIDE AND VILANTEROL TRIFENATATE 100; 62.5; 25 UG/1; UG/1; UG/1
POWDER RESPIRATORY (INHALATION)
Qty: 1 EACH | Refills: 0 | Status: SHIPPED | OUTPATIENT
Start: 2025-08-27

## 2025-08-27 RX ORDER — ATORVASTATIN CALCIUM 40 MG/1
40 TABLET, FILM COATED ORAL DAILY
Qty: 30 TABLET | Refills: 0 | Status: SHIPPED | OUTPATIENT
Start: 2025-08-27